# Patient Record
Sex: FEMALE | Race: WHITE | NOT HISPANIC OR LATINO | Employment: OTHER | URBAN - NONMETROPOLITAN AREA
[De-identification: names, ages, dates, MRNs, and addresses within clinical notes are randomized per-mention and may not be internally consistent; named-entity substitution may affect disease eponyms.]

---

## 2017-02-14 ENCOUNTER — OFFICE VISIT (OUTPATIENT)
Dept: PAIN MEDICINE | Facility: CLINIC | Age: 52
End: 2017-02-14

## 2017-02-14 VITALS
BODY MASS INDEX: 47.2 KG/M2 | WEIGHT: 256.5 LBS | SYSTOLIC BLOOD PRESSURE: 138 MMHG | HEIGHT: 62 IN | DIASTOLIC BLOOD PRESSURE: 88 MMHG

## 2017-02-14 DIAGNOSIS — M47.817 LUMBOSACRAL SPONDYLOSIS WITHOUT MYELOPATHY: Primary | ICD-10-CM

## 2017-02-14 DIAGNOSIS — M25.561 CHRONIC PAIN OF RIGHT KNEE: ICD-10-CM

## 2017-02-14 DIAGNOSIS — G89.29 CHRONIC PAIN OF RIGHT KNEE: ICD-10-CM

## 2017-02-14 DIAGNOSIS — Z79.899 HIGH RISK MEDICATIONS (NOT ANTICOAGULANTS) LONG-TERM USE: ICD-10-CM

## 2017-02-14 PROCEDURE — 99213 OFFICE O/P EST LOW 20 MIN: CPT | Performed by: PAIN MEDICINE

## 2017-02-14 RX ORDER — HYDROCODONE BITARTRATE AND ACETAMINOPHEN 7.5; 325 MG/1; MG/1
1 TABLET ORAL 4 TIMES DAILY
Qty: 120 TABLET | Refills: 0 | Status: SHIPPED | OUTPATIENT
Start: 2017-02-14 | End: 2017-03-16

## 2017-02-14 RX ORDER — MELATONIN
1000 DAILY
COMMUNITY
End: 2021-01-25

## 2017-02-14 RX ORDER — VALSARTAN 80 MG/1
80 TABLET ORAL NIGHTLY
COMMUNITY
Start: 2017-02-03 | End: 2020-04-22

## 2017-02-14 RX ORDER — CYCLOBENZAPRINE HCL 10 MG
TABLET ORAL
COMMUNITY
Start: 2016-12-30 | End: 2017-04-12 | Stop reason: ALTCHOICE

## 2017-02-14 RX ORDER — GABAPENTIN 300 MG/1
600 CAPSULE ORAL NIGHTLY
COMMUNITY
Start: 2016-12-23

## 2017-02-14 RX ORDER — RANITIDINE 150 MG/1
CAPSULE ORAL
COMMUNITY
Start: 2016-12-04 | End: 2020-12-09

## 2017-02-14 NOTE — PROGRESS NOTES
Haven Mahoney is a 51 y.o. female.   1965    HPI:   Location: lower back and upper back  Quality: aching, sharp, dull and tingling  Severity: 6/10  Timing: constant  Alleviating: pain medication  Aggravating: increased activity     Pt states she feels as though the rftc is wearing off and would like it repeated.  It provided good relief for 6 months.  She is 9 months out now.        The following portions of the patient's history were reviewed by me and updated as appropriate: allergies, current medications, past family history, past medical history, past social history, past surgical history and problem list.    Past Medical History   Diagnosis Date   • Anxiety    • Arthritis    • Arthropathy of lumbar facet joint    • Chronic depression    • Degeneration of lumbar intervertebral disc    • Drug therapy      Other long term (current) drug therapy      • Epilepsy    • Headache    • Hearing loss    • Hypercholesterolemia    • Hypertensive disorder    • Obesity    • Osteoporosis    • Osteoporosis    • Varicose veins of lower extremity        Social History     Social History   • Marital status:      Spouse name: N/A   • Number of children: N/A   • Years of education: N/A     Occupational History   • Not on file.     Social History Main Topics   • Smoking status: Never Smoker   • Smokeless tobacco: Not on file   • Alcohol use No   • Drug use: No   • Sexual activity: Defer     Other Topics Concern   • Not on file     Social History Narrative       Family History   Problem Relation Age of Onset   • Asthma Mother    • Cancer Mother      other   • Diabetes Mother    • Heart disease Mother    • Hyperlipidemia Mother    • Migraines Mother    • Osteoporosis Mother    • Cancer Father      other   • Hyperlipidemia Father    • Diabetes Maternal Grandmother    • Heart disease Maternal Grandfather    • Mental illness Other      Mental Disorder         Current Outpatient Prescriptions:   •  atorvastatin (LIPITOR) 20  MG tablet, Take 20 mg by mouth every night., Disp: , Rfl:   •  cetirizine (ZyrTEC) 10 MG tablet, Take 10 mg by mouth daily., Disp: , Rfl:   •  cholecalciferol (VITAMIN D3) 1000 UNITS tablet, Take 1,000 Units by mouth Daily., Disp: , Rfl:   •  cyclobenzaprine (FLEXERIL) 10 MG tablet, , Disp: , Rfl:   •  Esomeprazole Magnesium (NEXIUM PO), Take  by mouth every night., Disp: , Rfl:   •  gabapentin (NEURONTIN) 300 MG capsule, , Disp: , Rfl:   •  HYDROcodone-acetaminophen (NORCO) 7.5-325 MG per tablet, Take 1 tablet by mouth every 6 (six) hours as needed for moderate pain (4-6)., Disp: , Rfl:   •  raloxifene (EVISTA) 60 MG tablet, Take 60 mg by mouth daily., Disp: , Rfl:   •  ranitidine (ZANTAC) 150 MG capsule, , Disp: , Rfl:   •  valsartan (DIOVAN) 80 MG tablet, , Disp: , Rfl:   •  HYDROcodone-acetaminophen (NORCO) 7.5-325 MG per tablet, Take 1 tablet by mouth 4 (Four) Times a Day for 30 days., Disp: 120 tablet, Rfl: 0  •  HYDROcodone-acetaminophen (NORCO) 7.5-325 MG per tablet, Take 1 tablet by mouth 4 (Four) Times a Day for 30 days., Disp: 120 tablet, Rfl: 0    Allergies   Allergen Reactions   • Advil [Ibuprofen]    • Augmentin [Amoxicillin-Pot Clavulanate]    • Biaxin [Clarithromycin]    • Naprosyn [Naproxen]          Review of Systems   Musculoskeletal: Positive for back pain (lower and upper).     10 system review of systems was reviewed and negative except for above.    Physical Exam   Constitutional: She appears well-developed and well-nourished. No distress.   Musculoskeletal:        Right knee: She exhibits decreased range of motion (flexion less than 90deg).        Lumbar back: She exhibits decreased range of motion (left facet loading. ext 5deg.  flexion limited to 30 deg approx).   Neurological: She is alert.   Psychiatric: She has a normal mood and affect. Her behavior is normal. Judgment normal.       Haven was seen today for back pain.    Diagnoses and all orders for this visit:    Lumbosacral spondylosis  without myelopathy  -     IR RFA lumbar sacral 1st level left; Future  -     IR RFA lumbar sacral 2nd level left; Future    Chronic pain of right knee    High risk medications (not anticoagulants) long-term use    Other orders  -     HYDROcodone-acetaminophen (NORCO) 7.5-325 MG per tablet; Take 1 tablet by mouth 4 (Four) Times a Day for 30 days.  -     HYDROcodone-acetaminophen (NORCO) 7.5-325 MG per tablet; Take 1 tablet by mouth 4 (Four) Times a Day for 30 days.        Medication: Patient reports no negative side effects, Patient reports appropriate usage and storage habits, Patient's opioid provides enough reflief to be more active and perform activities of daily living with less discomfort. and Refill opioid medication as above    Interventional:  I have discussed the risks and benefits of the procedure with the patient.  left rftc at l4-5 and l5-1    Rehab: none at this time    Behavioral: No aberrant behavior noted. [facility] Report #[id number]  was reviewed and is consistent with stated history    Urine drug screen None at this time          This document has been electronically signed by Dudley Gamboa MD on February 14, 2017 7:59 AM

## 2017-02-27 ENCOUNTER — HOSPITAL ENCOUNTER (OUTPATIENT)
Dept: INTERVENTIONAL RADIOLOGY/VASCULAR | Facility: HOSPITAL | Age: 52
Discharge: HOME | End: 2017-02-27
Attending: PAIN MEDICINE

## 2017-02-27 ENCOUNTER — HOSPITAL ENCOUNTER (OUTPATIENT)
Dept: INTERVENTIONAL RADIOLOGY/VASCULAR | Facility: HOSPITAL | Age: 52
Discharge: HOME | End: 2017-02-27
Attending: PAIN MEDICINE | Admitting: PAIN MEDICINE

## 2017-02-27 VITALS
HEART RATE: 94 BPM | SYSTOLIC BLOOD PRESSURE: 176 MMHG | OXYGEN SATURATION: 96 % | DIASTOLIC BLOOD PRESSURE: 82 MMHG | RESPIRATION RATE: 20 BRPM

## 2017-02-27 DIAGNOSIS — M47.817 LUMBOSACRAL SPONDYLOSIS WITHOUT MYELOPATHY: ICD-10-CM

## 2017-02-27 PROCEDURE — 25010000002 METHYLPREDNISOLONE PER 40 MG: Performed by: PAIN MEDICINE

## 2017-02-27 PROCEDURE — 64635 DESTROY LUMB/SAC FACET JNT: CPT | Performed by: PAIN MEDICINE

## 2017-02-27 PROCEDURE — 64636 DESTROY L/S FACET JNT ADDL: CPT | Performed by: PAIN MEDICINE

## 2017-02-27 RX ORDER — METHYLPREDNISOLONE ACETATE 40 MG/ML
INJECTION, SUSPENSION INTRA-ARTICULAR; INTRALESIONAL; INTRAMUSCULAR; SOFT TISSUE
Status: COMPLETED | OUTPATIENT
Start: 2017-02-27 | End: 2017-02-27

## 2017-02-27 RX ORDER — LIDOCAINE HYDROCHLORIDE 10 MG/ML
INJECTION, SOLUTION INFILTRATION; PERINEURAL
Status: COMPLETED | OUTPATIENT
Start: 2017-02-27 | End: 2017-02-27

## 2017-02-27 RX ORDER — BUPIVACAINE HYDROCHLORIDE 5 MG/ML
INJECTION, SOLUTION PERINEURAL
Status: COMPLETED | OUTPATIENT
Start: 2017-02-27 | End: 2017-02-27

## 2017-02-27 RX ADMIN — BUPIVACAINE HYDROCHLORIDE 3 ML: 5 INJECTION, SOLUTION PERINEURAL at 13:19

## 2017-02-27 RX ADMIN — METHYLPREDNISOLONE ACETATE 12 MG: 40 INJECTION, SUSPENSION INTRA-ARTICULAR; INTRALESIONAL; INTRAMUSCULAR; SOFT TISSUE at 13:20

## 2017-02-27 RX ADMIN — LIDOCAINE HYDROCHLORIDE 7 ML: 10 INJECTION, SOLUTION INFILTRATION; PERINEURAL at 13:19

## 2017-04-12 ENCOUNTER — APPOINTMENT (OUTPATIENT)
Dept: LAB | Facility: HOSPITAL | Age: 52
End: 2017-04-12

## 2017-04-12 ENCOUNTER — OFFICE VISIT (OUTPATIENT)
Dept: PAIN MEDICINE | Facility: CLINIC | Age: 52
End: 2017-04-12

## 2017-04-12 VITALS
DIASTOLIC BLOOD PRESSURE: 92 MMHG | HEIGHT: 62 IN | WEIGHT: 255.4 LBS | BODY MASS INDEX: 47 KG/M2 | SYSTOLIC BLOOD PRESSURE: 160 MMHG

## 2017-04-12 DIAGNOSIS — Z79.899 HIGH RISK MEDICATIONS (NOT ANTICOAGULANTS) LONG-TERM USE: ICD-10-CM

## 2017-04-12 DIAGNOSIS — G89.29 CHRONIC PAIN OF RIGHT KNEE: ICD-10-CM

## 2017-04-12 DIAGNOSIS — M25.561 CHRONIC PAIN OF RIGHT KNEE: ICD-10-CM

## 2017-04-12 DIAGNOSIS — M47.817 LUMBOSACRAL SPONDYLOSIS WITHOUT MYELOPATHY: Primary | ICD-10-CM

## 2017-04-12 PROCEDURE — 99213 OFFICE O/P EST LOW 20 MIN: CPT | Performed by: NURSE PRACTITIONER

## 2017-04-12 PROCEDURE — G0481 DRUG TEST DEF 8-14 CLASSES: HCPCS | Performed by: NURSE PRACTITIONER

## 2017-04-12 PROCEDURE — 80307 DRUG TEST PRSMV CHEM ANLYZR: CPT | Performed by: NURSE PRACTITIONER

## 2017-04-12 NOTE — PROGRESS NOTES
Haven Mahoney is a 51 y.o. female.   1965    HPI:   Location: lower back and upper back  Quality: aching, sharp, dull and tingling  Severity: 6/10  Timing: constant  Alleviating: pain medication  Aggravating: increased activity    Pt reports 80% efficacy from recent RFA, especially with left leg pain. Opiate medications provides enough relief for daily activity and ambulation. NO SE. Pt happy with pain management visit and regimen.         The following portions of the patient's history were reviewed by me and updated as appropriate: allergies, current medications, past family history, past medical history, past social history, past surgical history and problem list.    Past Medical History:   Diagnosis Date   • Anxiety    • Arthritis    • Arthropathy of lumbar facet joint    • Chronic depression    • Degeneration of lumbar intervertebral disc    • Drug therapy     Other long term (current) drug therapy      • Epilepsy    • Headache    • Hearing loss    • Hypercholesterolemia    • Hypertensive disorder    • Obesity    • Osteoporosis    • Osteoporosis    • Varicose veins of lower extremity        Social History     Social History   • Marital status:      Spouse name: N/A   • Number of children: N/A   • Years of education: N/A     Occupational History   • Not on file.     Social History Main Topics   • Smoking status: Never Smoker   • Smokeless tobacco: Not on file   • Alcohol use No   • Drug use: No   • Sexual activity: Defer     Other Topics Concern   • Not on file     Social History Narrative       Family History   Problem Relation Age of Onset   • Asthma Mother    • Cancer Mother      other   • Diabetes Mother    • Heart disease Mother    • Hyperlipidemia Mother    • Migraines Mother    • Osteoporosis Mother    • Cancer Father      other   • Hyperlipidemia Father    • Diabetes Maternal Grandmother    • Heart disease Maternal Grandfather    • Mental illness Other      Mental Disorder         Current  Outpatient Prescriptions:   •  atorvastatin (LIPITOR) 20 MG tablet, Take 20 mg by mouth every night., Disp: , Rfl:   •  cetirizine (ZyrTEC) 10 MG tablet, Take 10 mg by mouth daily., Disp: , Rfl:   •  cholecalciferol (VITAMIN D3) 1000 UNITS tablet, Take 1,000 Units by mouth Daily., Disp: , Rfl:   •  Esomeprazole Magnesium (NEXIUM PO), Take  by mouth every night., Disp: , Rfl:   •  gabapentin (NEURONTIN) 300 MG capsule, , Disp: , Rfl:   •  HYDROcodone-acetaminophen (NORCO) 7.5-325 MG per tablet, Take 1 tablet by mouth every 6 (six) hours as needed for moderate pain (4-6)., Disp: , Rfl:   •  raloxifene (EVISTA) 60 MG tablet, Take 60 mg by mouth daily., Disp: , Rfl:   •  ranitidine (ZANTAC) 150 MG capsule, , Disp: , Rfl:   •  valsartan (DIOVAN) 80 MG tablet, , Disp: , Rfl:     Allergies   Allergen Reactions   • Advil [Ibuprofen]    • Augmentin [Amoxicillin-Pot Clavulanate]    • Biaxin [Clarithromycin]    • Naprosyn [Naproxen]    • Nsaids          Review of Systems   Musculoskeletal: Positive for back pain (lower and upper).     10 system review of systems was reviewed and negative except for above.    Physical Exam   Constitutional: She appears well-developed and well-nourished. No distress.   Cardiovascular: Normal rate and regular rhythm.    Pulmonary/Chest: Effort normal.   Musculoskeletal:        Right knee: She exhibits normal range of motion. Tenderness found.        Lumbar back: She exhibits decreased range of motion ( Flex 90 deg and 15 deg ext with mild FL) and tenderness.   Right knee tenderness with ambulation   Neurological: She is alert. Coordination and gait normal.   Reflex Scores:       Tricep reflexes are 2+ on the right side and 2+ on the left side.       Bicep reflexes are 2+ on the right side and 2+ on the left side.       Brachioradialis reflexes are 2+ on the right side and 2+ on the left side.       Patellar reflexes are 1+ on the right side and 1+ on the left side.       Achilles reflexes are 1+  "on the right side and 1+ on the left side.  Mild left SLR    Skin: Skin is warm and dry.   Psychiatric: She has a normal mood and affect. Her behavior is normal. Judgment normal. She expresses no homicidal and no suicidal ideation. She expresses no suicidal plans and no homicidal plans.   Nursing note and vitals reviewed.      Haven was seen today for back pain.    Diagnoses and all orders for this visit:    Lumbosacral spondylosis without myelopathy    Chronic pain of right knee    High risk medications (not anticoagulants) long-term use  -     ToxASSURE Select 13 (MW)      Medication: Patient reports no negative side effects, Patient reports appropriate usage and storage habits and Patient's opioid provides enough reflief to be more active and perform activities of daily living with less discomfort. Norco 7.5mg TID. Discussed case with Angel Gamboa, opiate medication refilled ×2 hand written scripts.      Interventional: Pt states \"RF effective 80%\"    Rehab: home stretching    Behavioral: No aberrant behavior noted. [facility] Report # [id number]  was reviewed and is consistent with stated history    Urine drug screen Ordered today to test for drugs of abuse and prescribed medications          This document has been electronically signed by MORENITA Enriquez on April 12, 2017 7:57 AM          This document has been electronically signed by MORENITA Enriquez on April 12, 2017 7:57 AM     "

## 2017-04-18 LAB
CONV REPORT SUMMARY: NORMAL
Lab: NORMAL

## 2017-06-09 ENCOUNTER — OFFICE VISIT (OUTPATIENT)
Dept: PAIN MEDICINE | Facility: CLINIC | Age: 52
End: 2017-06-09

## 2017-06-09 VITALS
WEIGHT: 250.8 LBS | DIASTOLIC BLOOD PRESSURE: 92 MMHG | HEIGHT: 60 IN | SYSTOLIC BLOOD PRESSURE: 140 MMHG | BODY MASS INDEX: 49.24 KG/M2

## 2017-06-09 DIAGNOSIS — M47.817 LUMBOSACRAL SPONDYLOSIS WITHOUT MYELOPATHY: Primary | ICD-10-CM

## 2017-06-09 DIAGNOSIS — G89.29 CHRONIC PAIN OF RIGHT KNEE: ICD-10-CM

## 2017-06-09 DIAGNOSIS — M25.561 CHRONIC PAIN OF RIGHT KNEE: ICD-10-CM

## 2017-06-09 DIAGNOSIS — M79.672 LEFT FOOT PAIN: ICD-10-CM

## 2017-06-09 DIAGNOSIS — Z79.899 HIGH RISK MEDICATIONS (NOT ANTICOAGULANTS) LONG-TERM USE: ICD-10-CM

## 2017-06-09 PROCEDURE — 99214 OFFICE O/P EST MOD 30 MIN: CPT | Performed by: PAIN MEDICINE

## 2017-06-09 RX ORDER — CLOPIDOGREL BISULFATE 75 MG/1
TABLET ORAL
COMMUNITY
Start: 2017-05-22 | End: 2021-01-25

## 2017-06-09 RX ORDER — PANTOPRAZOLE SODIUM 40 MG/1
TABLET, DELAYED RELEASE ORAL
COMMUNITY
Start: 2017-04-13 | End: 2021-01-25

## 2017-06-09 RX ORDER — HYDROCODONE BITARTRATE AND ACETAMINOPHEN 7.5; 325 MG/1; MG/1
1 TABLET ORAL
Qty: 150 TABLET | Refills: 0 | Status: SHIPPED | OUTPATIENT
Start: 2017-06-09 | End: 2017-07-09

## 2017-06-09 RX ORDER — FLUCONAZOLE 150 MG/1
TABLET ORAL
COMMUNITY
Start: 2017-04-18 | End: 2021-01-25

## 2017-06-09 NOTE — PROGRESS NOTES
Haven Mahoney is a 51 y.o. female.   1965    HPI:   Location: lower back upper  Quality: aching, sharp, dull and tingling  Severity: 6/10  Timing: constant  Alleviating: pain medication  Aggravating: increased activity     Pt seeing podiatry about a bone spur.  Waiting until summer is over to have surgery as she is watching her grandkids.  norco 7.5 qid not covering as well b/c of this foot issue.  She is requesting an increase until she has surgery.       The following portions of the patient's history were reviewed by me and updated as appropriate: allergies, current medications, past family history, past medical history, past social history, past surgical history and problem list.    Past Medical History:   Diagnosis Date   • Anxiety    • Arthritis    • Arthropathy of lumbar facet joint    • Chronic depression    • Degeneration of lumbar intervertebral disc    • Drug therapy     Other long term (current) drug therapy      • Epilepsy    • Headache    • Hearing loss    • Hypercholesterolemia    • Hypertensive disorder    • Obesity    • Osteoporosis    • Osteoporosis    • Varicose veins of lower extremity        Social History     Social History   • Marital status:      Spouse name: N/A   • Number of children: N/A   • Years of education: N/A     Occupational History   • Not on file.     Social History Main Topics   • Smoking status: Never Smoker   • Smokeless tobacco: Never Used   • Alcohol use No   • Drug use: No   • Sexual activity: Defer     Other Topics Concern   • Not on file     Social History Narrative       Family History   Problem Relation Age of Onset   • Asthma Mother    • Cancer Mother      other   • Diabetes Mother    • Heart disease Mother    • Hyperlipidemia Mother    • Migraines Mother    • Osteoporosis Mother    • Cancer Father      other   • Hyperlipidemia Father    • Diabetes Maternal Grandmother    • Heart disease Maternal Grandfather    • Mental illness Other      Mental Disorder          Current Outpatient Prescriptions:   •  atorvastatin (LIPITOR) 20 MG tablet, Take 20 mg by mouth every night., Disp: , Rfl:   •  cetirizine (ZyrTEC) 10 MG tablet, Take 10 mg by mouth daily., Disp: , Rfl:   •  cholecalciferol (VITAMIN D3) 1000 UNITS tablet, Take 1,000 Units by mouth Daily., Disp: , Rfl:   •  clopidogrel (PLAVIX) 75 MG tablet, , Disp: , Rfl:   •  Esomeprazole Magnesium (NEXIUM PO), Take  by mouth every night., Disp: , Rfl:   •  fluconazole (DIFLUCAN) 150 MG tablet, , Disp: , Rfl:   •  gabapentin (NEURONTIN) 300 MG capsule, , Disp: , Rfl:   •  HYDROcodone-acetaminophen (NORCO) 7.5-325 MG per tablet, Take 1 tablet by mouth every 6 (six) hours as needed for moderate pain (4-6)., Disp: , Rfl:   •  ranitidine (ZANTAC) 150 MG capsule, , Disp: , Rfl:   •  valsartan (DIOVAN) 80 MG tablet, , Disp: , Rfl:   •  HYDROcodone-acetaminophen (NORCO) 7.5-325 MG per tablet, Take 1 tablet by mouth 5 (Five) Times a Day for 30 days., Disp: 150 tablet, Rfl: 0  •  HYDROcodone-acetaminophen (NORCO) 7.5-325 MG per tablet, Take 1 tablet by mouth 5 (Five) Times a Day for 30 days., Disp: 150 tablet, Rfl: 0  •  pantoprazole (PROTONIX) 40 MG EC tablet, , Disp: , Rfl:   •  raloxifene (EVISTA) 60 MG tablet, Take 60 mg by mouth daily., Disp: , Rfl:     Allergies   Allergen Reactions   • Advil [Ibuprofen]    • Augmentin [Amoxicillin-Pot Clavulanate]    • Biaxin [Clarithromycin]    • Naprosyn [Naproxen]    • Nsaids        Review of Systems   Musculoskeletal: Positive for back pain (lower upper).   All other systems reviewed and are negative.    All systems reviewed and negative except for above.    Physical Exam   Constitutional: She appears well-developed and well-nourished. No distress.   Musculoskeletal:        Right knee: She exhibits decreased range of motion (flexion to about 110deg with discomfort).        Lumbar back: She exhibits decreased range of motion (mod left facet loading. ext 10deg.  flexion limited to 30 deg  approx).   Neurological: She is alert.   Psychiatric: She has a normal mood and affect. Her behavior is normal. Judgment normal.       Haven was seen today for back pain.    Diagnoses and all orders for this visit:    Lumbosacral spondylosis without myelopathy    Chronic pain of right knee    High risk medications (not anticoagulants) long-term use    Left foot pain    Other orders  -     HYDROcodone-acetaminophen (NORCO) 7.5-325 MG per tablet; Take 1 tablet by mouth 5 (Five) Times a Day for 30 days.  -     HYDROcodone-acetaminophen (NORCO) 7.5-325 MG per tablet; Take 1 tablet by mouth 5 (Five) Times a Day for 30 days.      Medication: Patient reports no negative side effects, Patient reports appropriate usage and storage habits, Patient's opioid provides enough reflief to be more active and perform activities of daily living with less discomfort. and Refill opioid medication as above.  Increase by 1 dose per day for better coverage due to pre-surgical foot pain.  Plan to reduce back to previous dose after surgery.     Interventional: none at this time  RF in February still helping.  May repeat in future if necessary.     Rehab: none at this time    Behavioral: No aberrant behavior noted. [facility] Report #[id number]  was reviewed and is consistent with stated history.  Reports her controlled meds are kept in a safe at home.     Urine drug screen Reviewed from last visit and is appropriate          This document has been electronically signed by Dudley Gabmoa MD on June 9, 2017 7:49 AM

## 2017-08-07 ENCOUNTER — OFFICE VISIT (OUTPATIENT)
Dept: PAIN MEDICINE | Facility: CLINIC | Age: 52
End: 2017-08-07

## 2017-08-07 VITALS
HEIGHT: 62 IN | SYSTOLIC BLOOD PRESSURE: 164 MMHG | DIASTOLIC BLOOD PRESSURE: 94 MMHG | WEIGHT: 251.5 LBS | BODY MASS INDEX: 46.28 KG/M2

## 2017-08-07 DIAGNOSIS — M47.817 LUMBOSACRAL SPONDYLOSIS WITHOUT MYELOPATHY: Primary | ICD-10-CM

## 2017-08-07 DIAGNOSIS — Z79.899 HIGH RISK MEDICATIONS (NOT ANTICOAGULANTS) LONG-TERM USE: ICD-10-CM

## 2017-08-07 DIAGNOSIS — M79.18 MYOFACIAL MUSCLE PAIN: ICD-10-CM

## 2017-08-07 DIAGNOSIS — M25.561 CHRONIC PAIN OF RIGHT KNEE: ICD-10-CM

## 2017-08-07 DIAGNOSIS — G89.29 CHRONIC PAIN OF RIGHT KNEE: ICD-10-CM

## 2017-08-07 PROCEDURE — 99213 OFFICE O/P EST LOW 20 MIN: CPT | Performed by: PAIN MEDICINE

## 2017-08-07 NOTE — PROGRESS NOTES
"Haven Mahoney is a 51 y.o. female.   1965    HPI:   Location: lower back  Quality: aching, sharp, dull and tingling  Severity: 6/10  Timing: constant  Alleviating: pain medication  Aggravating: increased activity    Pt suffering from \"NV\" for 2 days, seeing MD today. Pt seen Podiatry - Dr. Rowland, left ankle and bone spur upcoming soon. Opiate medications provides enough relief for daily activity and ambulation. NO SE. Pt happy with pain management visit and regimen.         The following portions of the patient's history were reviewed by me and updated as appropriate: allergies, current medications, past family history, past medical history, past social history, past surgical history and problem list.    Past Medical History:   Diagnosis Date   • Anxiety    • Arthritis    • Arthropathy of lumbar facet joint    • Chronic depression    • Degeneration of lumbar intervertebral disc    • Drug therapy     Other long term (current) drug therapy      • Epilepsy    • Headache    • Hearing loss    • Hypercholesterolemia    • Hypertensive disorder    • Obesity    • Osteoporosis    • Osteoporosis    • Varicose veins of lower extremity        Social History     Social History   • Marital status:      Spouse name: N/A   • Number of children: N/A   • Years of education: N/A     Occupational History   • Not on file.     Social History Main Topics   • Smoking status: Never Smoker   • Smokeless tobacco: Never Used   • Alcohol use No   • Drug use: No   • Sexual activity: Defer     Other Topics Concern   • Not on file     Social History Narrative       Family History   Problem Relation Age of Onset   • Asthma Mother    • Cancer Mother      other   • Diabetes Mother    • Heart disease Mother    • Hyperlipidemia Mother    • Migraines Mother    • Osteoporosis Mother    • Cancer Father      other   • Hyperlipidemia Father    • Diabetes Maternal Grandmother    • Heart disease Maternal Grandfather    • Mental illness Other "      Mental Disorder         Current Outpatient Prescriptions:   •  atorvastatin (LIPITOR) 20 MG tablet, Take 20 mg by mouth every night., Disp: , Rfl:   •  cetirizine (ZyrTEC) 10 MG tablet, Take 10 mg by mouth daily., Disp: , Rfl:   •  cholecalciferol (VITAMIN D3) 1000 UNITS tablet, Take 1,000 Units by mouth Daily., Disp: , Rfl:   •  clopidogrel (PLAVIX) 75 MG tablet, , Disp: , Rfl:   •  Esomeprazole Magnesium (NEXIUM PO), Take  by mouth every night., Disp: , Rfl:   •  fluconazole (DIFLUCAN) 150 MG tablet, , Disp: , Rfl:   •  gabapentin (NEURONTIN) 300 MG capsule, , Disp: , Rfl:   •  HYDROcodone-acetaminophen (NORCO) 7.5-325 MG per tablet, Take 1 tablet by mouth every 6 (six) hours as needed for moderate pain (4-6)., Disp: , Rfl:   •  pantoprazole (PROTONIX) 40 MG EC tablet, , Disp: , Rfl:   •  raloxifene (EVISTA) 60 MG tablet, Take 60 mg by mouth daily., Disp: , Rfl:   •  ranitidine (ZANTAC) 150 MG capsule, , Disp: , Rfl:   •  valsartan (DIOVAN) 80 MG tablet, , Disp: , Rfl:     Allergies   Allergen Reactions   • Advil [Ibuprofen]    • Augmentin [Amoxicillin-Pot Clavulanate]    • Biaxin [Clarithromycin]    • Naprosyn [Naproxen]    • Nsaids        Review of Systems   Musculoskeletal: Positive for back pain.   All other systems reviewed and are negative.    All systems reviewed and negative except for above.    Physical Exam   Constitutional: She is oriented to person, place, and time. She appears well-developed and well-nourished. No distress.   Cardiovascular: Normal rate.    Pulmonary/Chest: Effort normal.   Musculoskeletal:        Right knee: She exhibits decreased range of motion (nearly full flex - tenderness with ambulation).        Lumbar back: She exhibits decreased range of motion (Flex 60 deg and 10 ext deg with Tha FL) and tenderness.   Neurological: She is alert and oriented to person, place, and time. She displays no tremor. Coordination and gait normal.   Skin: Skin is warm.   Psychiatric: She has a  normal mood and affect. Her behavior is normal. Judgment normal. She expresses no homicidal and no suicidal ideation. She expresses no suicidal plans and no homicidal plans.   Nursing note and vitals reviewed.      Haven was seen today for back pain.    Diagnoses and all orders for this visit:    Lumbosacral spondylosis without myelopathy    Chronic pain of right knee    High risk medications (not anticoagulants) long-term use    Myofacial muscle pain      Medication: Patient reports no negative side effects, Patient reports appropriate usage and storage habits and Patient's opioid provides enough reflief to be more active and perform activities of daily living with less discomfort. Norco 7.5mg 5 x day . Discussed case with Angel Gamboa, opiate medication refilled ×2 hand written scripts.      Interventional: upcoming left heel spur surgery. WILFREDO and any neurological impairments discussed with patient that may need of emergency evaluation.    Rehab: Sees podiatry    Behavioral: No aberrant behavior noted. [facility] Report # [id number]  was reviewed and is consistent with stated history    Urine drug screen None at this time          This document has been electronically signed by MORENITA Enriquez on August 7, 2017 8:09 AM          This document has been electronically signed by MORENITA Enriquez on August 7, 2017 8:09 AM

## 2017-10-03 ENCOUNTER — OFFICE VISIT (OUTPATIENT)
Dept: PAIN MEDICINE | Facility: CLINIC | Age: 52
End: 2017-10-03

## 2017-10-03 VITALS
BODY MASS INDEX: 51.03 KG/M2 | HEIGHT: 60 IN | DIASTOLIC BLOOD PRESSURE: 84 MMHG | WEIGHT: 259.9 LBS | SYSTOLIC BLOOD PRESSURE: 144 MMHG

## 2017-10-03 DIAGNOSIS — Z79.899 HIGH RISK MEDICATIONS (NOT ANTICOAGULANTS) LONG-TERM USE: ICD-10-CM

## 2017-10-03 DIAGNOSIS — M25.561 CHRONIC PAIN OF RIGHT KNEE: ICD-10-CM

## 2017-10-03 DIAGNOSIS — G89.29 CHRONIC PAIN OF RIGHT KNEE: ICD-10-CM

## 2017-10-03 DIAGNOSIS — M79.18 MYOFACIAL MUSCLE PAIN: ICD-10-CM

## 2017-10-03 DIAGNOSIS — M47.817 LUMBOSACRAL SPONDYLOSIS WITHOUT MYELOPATHY: Primary | ICD-10-CM

## 2017-10-03 DIAGNOSIS — Z87.19 HX OF PANCREATITIS: ICD-10-CM

## 2017-10-03 PROCEDURE — 99214 OFFICE O/P EST MOD 30 MIN: CPT | Performed by: PAIN MEDICINE

## 2017-10-03 NOTE — PROGRESS NOTES
"Haven Mahoney is a 51 y.o. female.   1965    HPI:   Location: lower back  Quality: aching, sharp, dull and tingling  Severity: 6/10  Timing: constant  Alleviating: pain medication  Aggravating: increased activity    Pt reports 'was admitted to hospital\" recently for Pancreatitis- Seeing Dr. Crawley. Slight NV and abd pain. No opiate medications will be given today- unsure if opiates will be a part of treatment at this point. Pt aware        The following portions of the patient's history were reviewed by me and updated as appropriate: allergies, current medications, past family history, past medical history, past social history, past surgical history and problem list.    Past Medical History:   Diagnosis Date   • Anxiety    • Arthritis    • Arthropathy of lumbar facet joint    • Chronic depression    • Degeneration of lumbar intervertebral disc    • Drug therapy     Other long term (current) drug therapy      • Epilepsy    • Headache    • Hearing loss    • Hypercholesterolemia    • Hypertensive disorder    • Obesity    • Osteoporosis    • Osteoporosis    • Varicose veins of lower extremity        Social History     Social History   • Marital status:      Spouse name: N/A   • Number of children: N/A   • Years of education: N/A     Occupational History   • Not on file.     Social History Main Topics   • Smoking status: Never Smoker   • Smokeless tobacco: Never Used   • Alcohol use No   • Drug use: No   • Sexual activity: Defer     Other Topics Concern   • Not on file     Social History Narrative       Family History   Problem Relation Age of Onset   • Asthma Mother    • Cancer Mother      other   • Diabetes Mother    • Heart disease Mother    • Hyperlipidemia Mother    • Migraines Mother    • Osteoporosis Mother    • Cancer Father      other   • Hyperlipidemia Father    • Diabetes Maternal Grandmother    • Heart disease Maternal Grandfather    • Mental illness Other      Mental Disorder         Current " Outpatient Prescriptions:   •  atorvastatin (LIPITOR) 20 MG tablet, Take 20 mg by mouth every night., Disp: , Rfl:   •  cetirizine (ZyrTEC) 10 MG tablet, Take 10 mg by mouth daily., Disp: , Rfl:   •  cholecalciferol (VITAMIN D3) 1000 UNITS tablet, Take 1,000 Units by mouth Daily., Disp: , Rfl:   •  clopidogrel (PLAVIX) 75 MG tablet, , Disp: , Rfl:   •  Esomeprazole Magnesium (NEXIUM PO), Take  by mouth every night., Disp: , Rfl:   •  fluconazole (DIFLUCAN) 150 MG tablet, , Disp: , Rfl:   •  gabapentin (NEURONTIN) 300 MG capsule, , Disp: , Rfl:   •  HYDROcodone-acetaminophen (NORCO) 7.5-325 MG per tablet, Take 1 tablet by mouth every 6 (six) hours as needed for moderate pain (4-6)., Disp: , Rfl:   •  pantoprazole (PROTONIX) 40 MG EC tablet, , Disp: , Rfl:   •  raloxifene (EVISTA) 60 MG tablet, Take 60 mg by mouth daily., Disp: , Rfl:   •  ranitidine (ZANTAC) 150 MG capsule, , Disp: , Rfl:   •  valsartan (DIOVAN) 80 MG tablet, , Disp: , Rfl:     Allergies   Allergen Reactions   • Advil [Ibuprofen]    • Augmentin [Amoxicillin-Pot Clavulanate]    • Biaxin [Clarithromycin]    • Naprosyn [Naproxen]    • Nsaids        Review of Systems   Musculoskeletal: Positive for back pain.   All other systems reviewed and are negative.    All systems reviewed and negative except for above.    Physical Exam   Constitutional: She is oriented to person, place, and time. She appears well-developed and well-nourished. No distress.   Cardiovascular: Normal rate.    Pulmonary/Chest: Effort normal. No respiratory distress.   Abdominal: Soft. There is tenderness in the epigastric area.   Musculoskeletal:        Right knee: She exhibits decreased range of motion (tenderness with ambulation slight ext decrease).        Lumbar back: She exhibits decreased range of motion (Flex < 70deg and 10 ext deg with Tha facet loading) and tenderness ( midline ttp).   Neurological: She is alert and oriented to person, place, and time. She displays no tremor.  She displays no seizure activity. Coordination and gait normal.   Skin: Skin is warm.   Psychiatric: She has a normal mood and affect. Her behavior is normal. Judgment normal. She expresses no homicidal and no suicidal ideation. She expresses no suicidal plans and no homicidal plans.   Nursing note and vitals reviewed.      Haven was seen today for back pain.    Diagnoses and all orders for this visit:    Lumbosacral spondylosis without myelopathy    Chronic pain of right knee    High risk medications (not anticoagulants) long-term use    Myofacial muscle pain    Hx of pancreatitis      Medication: Patient reports no negative side effects, Patient reports appropriate usage and storage habits and Patient's opioid provides enough relief to be more active and perform activities of daily living with less discomfort. Norco 7.5mg 5 x day. Discussed case with Angel Gamboa, opiate medication refilled ×2 hand written scripts.      Interventional: Pt following up with [facility] siddiqi and [facility] Dr. Brown.     Rehab: knee exercises at home.     Behavioral: No aberrant behavior noted. [facility] Report # [id number]  was reviewed and is consistent with stated history    Urine drug screen None at this time- UDS next time          This document has been electronically signed by MORENITA Enriquez on October 3, 2017 7:56 AM    g      This document has been electronically signed by MORENITA Enriquez on October 3, 2017 7:56 AM

## 2017-11-27 ENCOUNTER — OFFICE VISIT (OUTPATIENT)
Dept: PAIN MEDICINE | Facility: CLINIC | Age: 52
End: 2017-11-27

## 2017-11-27 VITALS
BODY MASS INDEX: 51.04 KG/M2 | DIASTOLIC BLOOD PRESSURE: 84 MMHG | WEIGHT: 260 LBS | HEIGHT: 60 IN | SYSTOLIC BLOOD PRESSURE: 140 MMHG

## 2017-11-27 DIAGNOSIS — M47.817 LUMBOSACRAL SPONDYLOSIS WITHOUT MYELOPATHY: Primary | ICD-10-CM

## 2017-11-27 DIAGNOSIS — Z79.899 HIGH RISK MEDICATIONS (NOT ANTICOAGULANTS) LONG-TERM USE: ICD-10-CM

## 2017-11-27 DIAGNOSIS — G89.29 CHRONIC PAIN OF RIGHT KNEE: ICD-10-CM

## 2017-11-27 DIAGNOSIS — Z87.19 HX OF PANCREATITIS: ICD-10-CM

## 2017-11-27 DIAGNOSIS — M25.561 CHRONIC PAIN OF RIGHT KNEE: ICD-10-CM

## 2017-11-27 DIAGNOSIS — M79.18 MYOFACIAL MUSCLE PAIN: ICD-10-CM

## 2017-11-27 PROCEDURE — 99213 OFFICE O/P EST LOW 20 MIN: CPT | Performed by: NURSE PRACTITIONER

## 2017-11-27 RX ORDER — ATORVASTATIN CALCIUM 40 MG/1
TABLET, FILM COATED ORAL
COMMUNITY
Start: 2017-11-21 | End: 2020-04-22

## 2017-11-27 NOTE — PROGRESS NOTES
"Haven Mahoney is a 51 y.o. female.   1965    HPI:   Location: lower back  Quality: aching, sharp, dull and tingling  Severity: 6/10  Timing: constant  Alleviating: pain medication  Aggravating: increased activity    Pt reports \" pancreatitis under control at this time\"- sees Dr. Crawley in [place]. Pt with chronic lower back pain. Opiate medications provides enough relief for daily activity and ambulation. NO SE. Pt happy with pain management visit and regimen.         The following portions of the patient's history were reviewed by me and updated as appropriate: allergies, current medications, past family history, past medical history, past social history, past surgical history and problem list.    Past Medical History:   Diagnosis Date   • Anxiety    • Arthritis    • Arthropathy of lumbar facet joint    • Chronic depression    • Degeneration of lumbar intervertebral disc    • Drug therapy     Other long term (current) drug therapy      • Epilepsy    • Headache    • Hearing loss    • Hypercholesterolemia    • Hypertensive disorder    • Obesity    • Osteoporosis    • Osteoporosis    • Varicose veins of lower extremity        Social History     Social History   • Marital status:      Spouse name: N/A   • Number of children: N/A   • Years of education: N/A     Occupational History   • Not on file.     Social History Main Topics   • Smoking status: Never Smoker   • Smokeless tobacco: Never Used   • Alcohol use No   • Drug use: No   • Sexual activity: Defer     Other Topics Concern   • Not on file     Social History Narrative       Family History   Problem Relation Age of Onset   • Asthma Mother    • Cancer Mother      other   • Diabetes Mother    • Heart disease Mother    • Hyperlipidemia Mother    • Migraines Mother    • Osteoporosis Mother    • Cancer Father      other   • Hyperlipidemia Father    • Diabetes Maternal Grandmother    • Heart disease Maternal Grandfather    • Mental illness Other      " Mental Disorder         Current Outpatient Prescriptions:   •  atorvastatin (LIPITOR) 40 MG tablet, , Disp: , Rfl:   •  cetirizine (ZyrTEC) 10 MG tablet, Take 10 mg by mouth daily., Disp: , Rfl:   •  cholecalciferol (VITAMIN D3) 1000 UNITS tablet, Take 1,000 Units by mouth Daily., Disp: , Rfl:   •  clopidogrel (PLAVIX) 75 MG tablet, , Disp: , Rfl:   •  Esomeprazole Magnesium (NEXIUM PO), Take  by mouth every night., Disp: , Rfl:   •  fluconazole (DIFLUCAN) 150 MG tablet, , Disp: , Rfl:   •  gabapentin (NEURONTIN) 300 MG capsule, , Disp: , Rfl:   •  HYDROcodone-acetaminophen (NORCO) 7.5-325 MG per tablet, Take 1 tablet by mouth every 6 (six) hours as needed for moderate pain (4-6)., Disp: , Rfl:   •  pantoprazole (PROTONIX) 40 MG EC tablet, , Disp: , Rfl:   •  raloxifene (EVISTA) 60 MG tablet, Take 60 mg by mouth daily., Disp: , Rfl:   •  ranitidine (ZANTAC) 150 MG capsule, , Disp: , Rfl:   •  valsartan (DIOVAN) 80 MG tablet, , Disp: , Rfl:     Allergies   Allergen Reactions   • Advil [Ibuprofen]    • Augmentin [Amoxicillin-Pot Clavulanate]    • Biaxin [Clarithromycin]    • Naprosyn [Naproxen]    • Nsaids        Review of Systems   Musculoskeletal: Positive for back pain (lower).   All other systems reviewed and are negative.    All systems reviewed and negative except for above.    Physical Exam   Constitutional: She is oriented to person, place, and time. She appears well-developed and well-nourished. No distress.   Cardiovascular: Normal rate.    Pulmonary/Chest: Effort normal. No respiratory distress.   Abdominal: Soft.   Musculoskeletal:        Right knee: She exhibits decreased range of motion (tenderness with ambulation).        Lumbar back: She exhibits decreased range of motion (Flex  limited  75 deg and ext deg with jann facet loading) and tenderness (midline ttp).   Neurological: She is alert and oriented to person, place, and time. She displays no tremor. She displays no seizure activity. Coordination and  gait normal.   Reflex Scores:       Patellar reflexes are 2+ on the right side and 2+ on the left side.       Achilles reflexes are 2+ on the right side and 2+ on the left side.  Skin: Skin is warm.   Psychiatric: She has a normal mood and affect. Her behavior is normal. Judgment normal. She expresses no homicidal and no suicidal ideation. She expresses no suicidal plans and no homicidal plans.   Nursing note and vitals reviewed.      Haven was seen today for back pain.    Diagnoses and all orders for this visit:    Lumbosacral spondylosis without myelopathy    Chronic pain of right knee    High risk medications (not anticoagulants) long-term use    Myofacial muscle pain    Hx of pancreatitis        Medication: Patient reports no negative side effects, Patient reports appropriate usage and storage habits and Patient's opioid provides enough relief to be more active and perform activities of daily living with less discomfort. Norco 7.5mg 5 x day. Discussed case with Angel Gamboa, opiate medication refilled ×2 hand written scripts.      Interventional: Pt sees Dr. Crawley for pancreatitis.     Rehab: home passive exercises.     Behavioral: No aberrant behavior noted. [facility] Report # [id number]  was reviewed and is consistent with stated history    Urine drug screen None at this time          This document has been electronically signed by MORENITA Enriquez on November 27, 2017 8:05 AM          This document has been electronically signed by MORENITA Enriquez on November 27, 2017 8:05 AM

## 2020-02-06 ENCOUNTER — HOSPITAL ENCOUNTER (EMERGENCY)
Facility: HOSPITAL | Age: 55
Discharge: HOME | End: 2020-02-06
Attending: EMERGENCY MEDICINE | Admitting: EMERGENCY MEDICINE

## 2020-02-06 ENCOUNTER — APPOINTMENT (OUTPATIENT)
Dept: GENERAL RADIOLOGY | Facility: HOSPITAL | Age: 55
End: 2020-02-06

## 2020-02-06 VITALS
TEMPERATURE: 97.5 F | OXYGEN SATURATION: 95 % | WEIGHT: 257.19 LBS | SYSTOLIC BLOOD PRESSURE: 154 MMHG | BODY MASS INDEX: 50.23 KG/M2 | DIASTOLIC BLOOD PRESSURE: 88 MMHG | HEART RATE: 83 BPM | RESPIRATION RATE: 18 BRPM

## 2020-02-06 DIAGNOSIS — R51.9 NONINTRACTABLE HEADACHE, UNSPECIFIED CHRONICITY PATTERN, UNSPECIFIED HEADACHE TYPE: Primary | ICD-10-CM

## 2020-02-06 DIAGNOSIS — J06.9 UPPER RESPIRATORY TRACT INFECTION, UNSPECIFIED TYPE: ICD-10-CM

## 2020-02-06 LAB
ALBUMIN SERPL-MCNC: 4.4 G/DL (ref 3.5–5.2)
ALBUMIN/GLOB SERPL: 1.2 G/DL
ALP SERPL-CCNC: 147 U/L (ref 39–117)
ALT SERPL W P-5'-P-CCNC: 18 U/L (ref 1–33)
ANION GAP SERPL CALCULATED.3IONS-SCNC: 12 MMOL/L (ref 5–15)
AST SERPL-CCNC: 27 U/L (ref 1–32)
BASOPHILS # BLD AUTO: 0.05 10*3/MM3 (ref 0–0.2)
BASOPHILS NFR BLD AUTO: 0.6 % (ref 0–1.5)
BILIRUB SERPL-MCNC: 0.3 MG/DL (ref 0.2–1.2)
BUN BLD-MCNC: 7 MG/DL (ref 6–20)
BUN/CREAT SERPL: 9.7 (ref 7–25)
CALCIUM SPEC-SCNC: 9.9 MG/DL (ref 8.6–10.5)
CHLORIDE SERPL-SCNC: 100 MMOL/L (ref 98–107)
CO2 SERPL-SCNC: 28 MMOL/L (ref 22–29)
CREAT BLD-MCNC: 0.72 MG/DL (ref 0.57–1)
DEPRECATED RDW RBC AUTO: 39.1 FL (ref 37–54)
EOSINOPHIL # BLD AUTO: 0.07 10*3/MM3 (ref 0–0.4)
EOSINOPHIL NFR BLD AUTO: 0.8 % (ref 0.3–6.2)
ERYTHROCYTE [DISTWIDTH] IN BLOOD BY AUTOMATED COUNT: 13.6 % (ref 12.3–15.4)
GFR SERPL CREATININE-BSD FRML MDRD: 84 ML/MIN/1.73
GLOBULIN UR ELPH-MCNC: 3.6 GM/DL
GLUCOSE BLD-MCNC: 146 MG/DL (ref 65–99)
HCT VFR BLD AUTO: 43.5 % (ref 34–46.6)
HGB BLD-MCNC: 14.2 G/DL (ref 12–15.9)
HOLD SPECIMEN: NORMAL
HOLD SPECIMEN: NORMAL
IMM GRANULOCYTES # BLD AUTO: 0.05 10*3/MM3 (ref 0–0.05)
IMM GRANULOCYTES NFR BLD AUTO: 0.6 % (ref 0–0.5)
LYMPHOCYTES # BLD AUTO: 1.62 10*3/MM3 (ref 0.7–3.1)
LYMPHOCYTES NFR BLD AUTO: 19.4 % (ref 19.6–45.3)
MCH RBC QN AUTO: 26 PG (ref 26.6–33)
MCHC RBC AUTO-ENTMCNC: 32.6 G/DL (ref 31.5–35.7)
MCV RBC AUTO: 79.7 FL (ref 79–97)
MONOCYTES # BLD AUTO: 0.37 10*3/MM3 (ref 0.1–0.9)
MONOCYTES NFR BLD AUTO: 4.4 % (ref 5–12)
NEUTROPHILS # BLD AUTO: 6.2 10*3/MM3 (ref 1.7–7)
NEUTROPHILS NFR BLD AUTO: 74.2 % (ref 42.7–76)
NRBC BLD AUTO-RTO: 0 /100 WBC (ref 0–0.2)
NT-PROBNP SERPL-MCNC: 75 PG/ML (ref 5–900)
PLATELET # BLD AUTO: 208 10*3/MM3 (ref 140–450)
PMV BLD AUTO: 10.1 FL (ref 6–12)
POTASSIUM BLD-SCNC: 4 MMOL/L (ref 3.5–5.2)
PROT SERPL-MCNC: 8 G/DL (ref 6–8.5)
RBC # BLD AUTO: 5.46 10*6/MM3 (ref 3.77–5.28)
SODIUM BLD-SCNC: 140 MMOL/L (ref 136–145)
TROPONIN T SERPL-MCNC: <0.01 NG/ML (ref 0–0.03)
WBC NRBC COR # BLD: 8.36 10*3/MM3 (ref 3.4–10.8)
WHOLE BLOOD HOLD SPECIMEN: NORMAL
WHOLE BLOOD HOLD SPECIMEN: NORMAL

## 2020-02-06 PROCEDURE — 96376 TX/PRO/DX INJ SAME DRUG ADON: CPT

## 2020-02-06 PROCEDURE — 85025 COMPLETE CBC W/AUTO DIFF WBC: CPT | Performed by: EMERGENCY MEDICINE

## 2020-02-06 PROCEDURE — 99284 EMERGENCY DEPT VISIT MOD MDM: CPT

## 2020-02-06 PROCEDURE — 96361 HYDRATE IV INFUSION ADD-ON: CPT

## 2020-02-06 PROCEDURE — 93005 ELECTROCARDIOGRAM TRACING: CPT | Performed by: EMERGENCY MEDICINE

## 2020-02-06 PROCEDURE — 93010 ELECTROCARDIOGRAM REPORT: CPT | Performed by: INTERNAL MEDICINE

## 2020-02-06 PROCEDURE — 25010000002 BUTORPHANOL PER 1 MG: Performed by: EMERGENCY MEDICINE

## 2020-02-06 PROCEDURE — 96374 THER/PROPH/DIAG INJ IV PUSH: CPT

## 2020-02-06 PROCEDURE — 25010000002 PROMETHAZINE PER 50 MG: Performed by: EMERGENCY MEDICINE

## 2020-02-06 PROCEDURE — 84484 ASSAY OF TROPONIN QUANT: CPT | Performed by: EMERGENCY MEDICINE

## 2020-02-06 PROCEDURE — 83880 ASSAY OF NATRIURETIC PEPTIDE: CPT | Performed by: EMERGENCY MEDICINE

## 2020-02-06 PROCEDURE — 96375 TX/PRO/DX INJ NEW DRUG ADDON: CPT

## 2020-02-06 PROCEDURE — 80053 COMPREHEN METABOLIC PANEL: CPT | Performed by: EMERGENCY MEDICINE

## 2020-02-06 PROCEDURE — 71046 X-RAY EXAM CHEST 2 VIEWS: CPT

## 2020-02-06 RX ORDER — BUTALBITAL, ACETAMINOPHEN AND CAFFEINE 50; 325; 40 MG/1; MG/1; MG/1
1 TABLET ORAL EVERY 6 HOURS PRN
Qty: 15 TABLET | Refills: 0 | Status: SHIPPED | OUTPATIENT
Start: 2020-02-06 | End: 2021-01-25

## 2020-02-06 RX ORDER — PROMETHAZINE HYDROCHLORIDE 25 MG/ML
12.5 INJECTION, SOLUTION INTRAMUSCULAR; INTRAVENOUS ONCE
Status: COMPLETED | OUTPATIENT
Start: 2020-02-06 | End: 2020-02-06

## 2020-02-06 RX ORDER — SODIUM CHLORIDE 9 MG/ML
125 INJECTION, SOLUTION INTRAVENOUS CONTINUOUS
Status: DISCONTINUED | OUTPATIENT
Start: 2020-02-06 | End: 2020-02-06 | Stop reason: HOSPADM

## 2020-02-06 RX ORDER — DOXYCYCLINE 100 MG/1
100 CAPSULE ORAL 2 TIMES DAILY
Qty: 14 CAPSULE | Refills: 0 | OUTPATIENT
Start: 2020-02-06 | End: 2020-12-08

## 2020-02-06 RX ORDER — BUTORPHANOL TARTRATE 1 MG/ML
1 INJECTION, SOLUTION INTRAMUSCULAR; INTRAVENOUS ONCE
Status: COMPLETED | OUTPATIENT
Start: 2020-02-06 | End: 2020-02-06

## 2020-02-06 RX ORDER — SODIUM CHLORIDE 0.9 % (FLUSH) 0.9 %
10 SYRINGE (ML) INJECTION AS NEEDED
Status: DISCONTINUED | OUTPATIENT
Start: 2020-02-06 | End: 2020-02-06 | Stop reason: HOSPADM

## 2020-02-06 RX ORDER — LABETALOL HYDROCHLORIDE 5 MG/ML
20 INJECTION, SOLUTION INTRAVENOUS ONCE
Status: COMPLETED | OUTPATIENT
Start: 2020-02-06 | End: 2020-02-06

## 2020-02-06 RX ADMIN — LABETALOL HYDROCHLORIDE 20 MG: 5 INJECTION, SOLUTION INTRAVENOUS at 10:22

## 2020-02-06 RX ADMIN — SODIUM CHLORIDE 50 ML: 9 INJECTION, SOLUTION INTRAVENOUS at 10:25

## 2020-02-06 RX ADMIN — BUTORPHANOL TARTRATE 1 MG: 1 INJECTION, SOLUTION INTRAMUSCULAR; INTRAVENOUS at 10:25

## 2020-02-06 RX ADMIN — BUTORPHANOL TARTRATE 1 MG: 1 INJECTION, SOLUTION INTRAMUSCULAR; INTRAVENOUS at 12:23

## 2020-02-06 RX ADMIN — PROMETHAZINE HYDROCHLORIDE 12.5 MG: 25 INJECTION INTRAMUSCULAR; INTRAVENOUS at 10:25

## 2020-02-06 RX ADMIN — SODIUM CHLORIDE 125 ML/HR: 9 INJECTION, SOLUTION INTRAVENOUS at 10:25

## 2020-02-06 NOTE — ED PROVIDER NOTES
"Subjective   Patient presents emergency department multiple complaints.  Patient states she has been on Diovan for her blood pressure, this is evidently not been helping her in the last 24 to 48 hours.  Patient denies any dietary changes.  Patient states that with when her blood pressure goes up she gets a frontal headache extending to the bitemporal region.  There is no thunderclap nature of this headache.  It is gradual onset.  Patient has had some nausea with this without vomiting.  Patient also notes a on chest tightness and shortness of breath.  Patient has a cath in the last 5 years which was normal.  Patient states that the physician told her at the catheter heart looks like \"a 15-year-old\".  Patient denies any palpitations.  No radiation or chest pain.  No history of COPD.  No fevers have been noted.          Review of Systems   Constitutional: Positive for fatigue. Negative for activity change, appetite change, chills and fever.   HENT: Negative for congestion.    Eyes: Negative.  Negative for photophobia and visual disturbance.   Respiratory: Positive for chest tightness and shortness of breath. Negative for cough.    Cardiovascular: Negative.  Negative for chest pain and palpitations.   Gastrointestinal: Negative.  Negative for abdominal pain, constipation, diarrhea, nausea and vomiting.   Endocrine: Negative.    Genitourinary: Negative.  Negative for decreased urine volume, dysuria, flank pain and hematuria.   Musculoskeletal: Negative.  Negative for arthralgias, back pain, myalgias, neck pain and neck stiffness.   Skin: Negative.  Negative for pallor.   Neurological: Positive for headaches. Negative for dizziness, syncope, weakness, light-headedness and numbness.   Psychiatric/Behavioral: Negative.  Negative for confusion and suicidal ideas. The patient is not nervous/anxious.    All other systems reviewed and are negative.      Past Medical History:   Diagnosis Date   • Anxiety    • Arthritis    • " Arthropathy of lumbar facet joint    • Chronic depression    • Degeneration of lumbar intervertebral disc    • Drug therapy     Other long term (current) drug therapy      • Epilepsy (CMS/HCC)    • Headache    • Hearing loss    • Hypercholesterolemia    • Hypertensive disorder    • Obesity    • Osteoporosis    • Osteoporosis    • Varicose veins of lower extremity        Allergies   Allergen Reactions   • Advil [Ibuprofen]    • Augmentin [Amoxicillin-Pot Clavulanate]    • Biaxin [Clarithromycin]    • Naprosyn [Naproxen]    • Nsaids        Past Surgical History:   Procedure Laterality Date   • CARDIAC CATHETERIZATION  06/05/2013    Cardiac cath 05956 (1)      • INJECTION OF MEDICATION  03/28/2016    Injection for nerve block (2)      • OTHER SURGICAL HISTORY  05/05/2016    Incise spinal column/nerves (1)          Family History   Problem Relation Age of Onset   • Asthma Mother    • Cancer Mother         other   • Diabetes Mother    • Heart disease Mother    • Hyperlipidemia Mother    • Migraines Mother    • Osteoporosis Mother    • Cancer Father         other   • Hyperlipidemia Father    • Diabetes Maternal Grandmother    • Heart disease Maternal Grandfather    • Mental illness Other         Mental Disorder       Social History     Socioeconomic History   • Marital status:      Spouse name: Not on file   • Number of children: Not on file   • Years of education: Not on file   • Highest education level: Not on file   Tobacco Use   • Smoking status: Never Smoker   • Smokeless tobacco: Never Used   Substance and Sexual Activity   • Alcohol use: No   • Drug use: No   • Sexual activity: Defer           Objective   Physical Exam   Constitutional: She is oriented to person, place, and time. She appears well-developed and well-nourished. No distress.   HENT:   Head: Normocephalic and atraumatic.   Nose: Nose normal.   Mouth/Throat: Oropharynx is clear and moist.   Eyes: Conjunctivae and EOM are normal. No scleral  icterus.   Neck: Normal range of motion. Neck supple. No JVD present.   Cardiovascular: Normal rate, regular rhythm, normal heart sounds and intact distal pulses. Exam reveals no gallop and no friction rub.   No murmur heard.  Pulmonary/Chest: Effort normal. No respiratory distress. She has no wheezes. She has no rales. She exhibits no tenderness.   Abdominal: Soft. She exhibits no distension and no mass. There is no tenderness. There is no rebound and no guarding.   Musculoskeletal: Normal range of motion. She exhibits no edema, tenderness or deformity.   Lymphadenopathy:     She has no cervical adenopathy.   Neurological: She is alert and oriented to person, place, and time. No cranial nerve deficit. She exhibits normal muscle tone.   Skin: Skin is warm and dry. Capillary refill takes less than 2 seconds. No rash noted. She is not diaphoretic. No erythema. No pallor.   Psychiatric: She has a normal mood and affect. Her behavior is normal. Judgment and thought content normal.   Nursing note and vitals reviewed.      Procedures           ED Course                                     Labs Reviewed   COMPREHENSIVE METABOLIC PANEL - Abnormal; Notable for the following components:       Result Value    Glucose 146 (*)     Alkaline Phosphatase 147 (*)     All other components within normal limits    Narrative:     GFR Normal >60  Chronic Kidney Disease <60  Kidney Failure <15     CBC WITH AUTO DIFFERENTIAL - Abnormal; Notable for the following components:    RBC 5.46 (*)     MCH 26.0 (*)     Lymphocyte % 19.4 (*)     Monocyte % 4.4 (*)     Immature Grans % 0.6 (*)     All other components within normal limits   BNP (IN-HOUSE) - Normal    Narrative:     Among patients with dyspnea, NT-proBNP is highly sensitive for the detection of acute congestive heart failure. In addition NT-proBNP of <300 pg/ml effectively rules out acute congestive heart failure with 99% negative predictive value.    Results may be falsely decreased  if patient taking Biotin.     TROPONIN (IN-HOUSE) - Normal    Narrative:     Troponin T Reference Range:  <= 0.03 ng/mL-   Negative for AMI  >0.03 ng/mL-     Abnormal for myocardial necrosis.  Clinicians would have to utilize clinical acumen, EKG, Troponin and serial changes to determine if it is an Acute Myocardial Infarction or myocardial injury due to an underlying chronic condition.       Results may be falsely decreased if patient taking Biotin.     RAINBOW DRAW    Narrative:     The following orders were created for panel order [place] Draw.  Procedure                               Abnormality         Status                     ---------                               -----------         ------                     Light Blue Top[[id number]]                                   Final result               Green Top (Gel)[[id number]]                                  Final result               Lavender Top[[id number]]                                     Final result               Gold Top - SST[[id number]]                                   Final result                 Please view results for these tests on the individual orders.   CBC AND DIFFERENTIAL    Narrative:     The following orders were created for panel order CBC & Differential.  Procedure                               Abnormality         Status                     ---------                               -----------         ------                     CBC Auto Differential[[id number]]        Abnormal            Final result                 Please view results for these tests on the individual orders.   LIGHT BLUE TOP   GREEN TOP   LAVENDER TOP   GOLD TOP - SST       XR Chest 2 View   Final Result   No active disease.         Electronically signed by:  Juan Francisco Bliss MD  2/6/2020 10:17 AM   CST Workstation: [id number]        No acute findings.  Patient proved with ED interventions.  Patient be discharged outpatient follow-up with her primary.          [facility]    Final  diagnoses:   Nonintractable headache, unspecified chronicity pattern, unspecified headache type   Upper respiratory tract infection, unspecified type            Alfredito Cuevas MD  02/06/20 [id number]

## 2020-02-06 NOTE — DISCHARGE INSTRUCTIONS
Follow-up with Dr. Lin as needed for further evaluation and management.Return with any new or worsening symptoms, or any concerns.

## 2020-03-17 ENCOUNTER — APPOINTMENT (OUTPATIENT)
Dept: CT IMAGING | Facility: HOSPITAL | Age: 55
End: 2020-03-17

## 2020-03-17 ENCOUNTER — HOSPITAL ENCOUNTER (EMERGENCY)
Facility: HOSPITAL | Age: 55
Discharge: HOME | End: 2020-03-17
Attending: EMERGENCY MEDICINE | Admitting: EMERGENCY MEDICINE

## 2020-03-17 ENCOUNTER — APPOINTMENT (OUTPATIENT)
Dept: GENERAL RADIOLOGY | Facility: HOSPITAL | Age: 55
End: 2020-03-17

## 2020-03-17 VITALS
TEMPERATURE: 97.8 F | DIASTOLIC BLOOD PRESSURE: 66 MMHG | HEART RATE: 74 BPM | WEIGHT: 260 LBS | OXYGEN SATURATION: 95 % | RESPIRATION RATE: 18 BRPM | BODY MASS INDEX: 51.04 KG/M2 | SYSTOLIC BLOOD PRESSURE: 125 MMHG | HEIGHT: 60 IN

## 2020-03-17 DIAGNOSIS — R10.13 EPIGASTRIC PAIN: Primary | ICD-10-CM

## 2020-03-17 DIAGNOSIS — I10 ESSENTIAL HYPERTENSION: ICD-10-CM

## 2020-03-17 DIAGNOSIS — K57.10 DIVERTICULUM OF DUODENUM: ICD-10-CM

## 2020-03-17 DIAGNOSIS — K76.0 FATTY LIVER: ICD-10-CM

## 2020-03-17 LAB
ALBUMIN SERPL-MCNC: 4.4 G/DL (ref 3.5–5.2)
ALBUMIN/GLOB SERPL: 1.2 G/DL
ALP SERPL-CCNC: 134 U/L (ref 39–117)
ALT SERPL W P-5'-P-CCNC: 24 U/L (ref 1–33)
AMYLASE SERPL-CCNC: 42 U/L (ref 28–100)
ANION GAP SERPL CALCULATED.3IONS-SCNC: 14 MMOL/L (ref 5–15)
AST SERPL-CCNC: 29 U/L (ref 1–32)
BACTERIA UR QL AUTO: ABNORMAL /HPF
BASOPHILS # BLD AUTO: 0.05 10*3/MM3 (ref 0–0.2)
BASOPHILS NFR BLD AUTO: 0.5 % (ref 0–1.5)
BILIRUB SERPL-MCNC: 0.6 MG/DL (ref 0.2–1.2)
BILIRUB UR QL STRIP: NEGATIVE
BUN BLD-MCNC: 10 MG/DL (ref 6–20)
BUN/CREAT SERPL: 14.5 (ref 7–25)
CALCIUM SPEC-SCNC: 10.1 MG/DL (ref 8.6–10.5)
CHLORIDE SERPL-SCNC: 99 MMOL/L (ref 98–107)
CLARITY UR: CLEAR
CO2 SERPL-SCNC: 27 MMOL/L (ref 22–29)
COLOR UR: YELLOW
CREAT BLD-MCNC: 0.69 MG/DL (ref 0.57–1)
D-LACTATE SERPL-SCNC: 1.5 MMOL/L (ref 0.5–2)
DEPRECATED RDW RBC AUTO: 39.6 FL (ref 37–54)
EOSINOPHIL # BLD AUTO: 0.04 10*3/MM3 (ref 0–0.4)
EOSINOPHIL NFR BLD AUTO: 0.4 % (ref 0.3–6.2)
ERYTHROCYTE [DISTWIDTH] IN BLOOD BY AUTOMATED COUNT: 13.6 % (ref 12.3–15.4)
GFR SERPL CREATININE-BSD FRML MDRD: 88 ML/MIN/1.73
GLOBULIN UR ELPH-MCNC: 3.7 GM/DL
GLUCOSE BLD-MCNC: 115 MG/DL (ref 65–99)
GLUCOSE UR STRIP-MCNC: NEGATIVE MG/DL
HCT VFR BLD AUTO: 43.8 % (ref 34–46.6)
HGB BLD-MCNC: 14.2 G/DL (ref 12–15.9)
HGB UR QL STRIP.AUTO: ABNORMAL
HOLD SPECIMEN: NORMAL
HYALINE CASTS UR QL AUTO: ABNORMAL /LPF
IMM GRANULOCYTES # BLD AUTO: 0.06 10*3/MM3 (ref 0–0.05)
IMM GRANULOCYTES NFR BLD AUTO: 0.6 % (ref 0–0.5)
KETONES UR QL STRIP: NEGATIVE
LDH SERPL-CCNC: 173 U/L (ref 135–214)
LEUKOCYTE ESTERASE UR QL STRIP.AUTO: ABNORMAL
LIPASE SERPL-CCNC: 17 U/L (ref 13–60)
LYMPHOCYTES # BLD AUTO: 1.74 10*3/MM3 (ref 0.7–3.1)
LYMPHOCYTES NFR BLD AUTO: 16.9 % (ref 19.6–45.3)
MCH RBC QN AUTO: 25.9 PG (ref 26.6–33)
MCHC RBC AUTO-ENTMCNC: 32.4 G/DL (ref 31.5–35.7)
MCV RBC AUTO: 79.9 FL (ref 79–97)
MONOCYTES # BLD AUTO: 0.5 10*3/MM3 (ref 0.1–0.9)
MONOCYTES NFR BLD AUTO: 4.9 % (ref 5–12)
NEUTROPHILS # BLD AUTO: 7.89 10*3/MM3 (ref 1.7–7)
NEUTROPHILS NFR BLD AUTO: 76.7 % (ref 42.7–76)
NITRITE UR QL STRIP: NEGATIVE
NRBC BLD AUTO-RTO: 0 /100 WBC (ref 0–0.2)
PH UR STRIP.AUTO: 7 [PH] (ref 5–9)
PLATELET # BLD AUTO: 236 10*3/MM3 (ref 140–450)
PMV BLD AUTO: 9.5 FL (ref 6–12)
POTASSIUM BLD-SCNC: 4 MMOL/L (ref 3.5–5.2)
PROT SERPL-MCNC: 8.1 G/DL (ref 6–8.5)
PROT UR QL STRIP: NEGATIVE
RBC # BLD AUTO: 5.48 10*6/MM3 (ref 3.77–5.28)
RBC # UR: ABNORMAL /HPF
REF LAB TEST METHOD: ABNORMAL
SODIUM BLD-SCNC: 140 MMOL/L (ref 136–145)
SP GR UR STRIP: 1.01 (ref 1–1.03)
SQUAMOUS #/AREA URNS HPF: ABNORMAL /HPF
TROPONIN T SERPL-MCNC: <0.01 NG/ML (ref 0–0.03)
UROBILINOGEN UR QL STRIP: ABNORMAL
WBC NRBC COR # BLD: 10.28 10*3/MM3 (ref 3.4–10.8)
WBC UR QL AUTO: ABNORMAL /HPF
WHOLE BLOOD HOLD SPECIMEN: NORMAL

## 2020-03-17 PROCEDURE — 81001 URINALYSIS AUTO W/SCOPE: CPT | Performed by: EMERGENCY MEDICINE

## 2020-03-17 PROCEDURE — 80053 COMPREHEN METABOLIC PANEL: CPT | Performed by: EMERGENCY MEDICINE

## 2020-03-17 PROCEDURE — 74177 CT ABD & PELVIS W/CONTRAST: CPT

## 2020-03-17 PROCEDURE — 36415 COLL VENOUS BLD VENIPUNCTURE: CPT | Performed by: EMERGENCY MEDICINE

## 2020-03-17 PROCEDURE — 96374 THER/PROPH/DIAG INJ IV PUSH: CPT

## 2020-03-17 PROCEDURE — 74022 RADEX COMPL AQT ABD SERIES: CPT

## 2020-03-17 PROCEDURE — 83615 LACTATE (LD) (LDH) ENZYME: CPT | Performed by: EMERGENCY MEDICINE

## 2020-03-17 PROCEDURE — 85025 COMPLETE CBC W/AUTO DIFF WBC: CPT | Performed by: EMERGENCY MEDICINE

## 2020-03-17 PROCEDURE — 25010000002 PROMETHAZINE PER 50 MG: Performed by: EMERGENCY MEDICINE

## 2020-03-17 PROCEDURE — 96375 TX/PRO/DX INJ NEW DRUG ADDON: CPT

## 2020-03-17 PROCEDURE — 25010000002 HYDROMORPHONE 1 MG/ML SOLUTION: Performed by: EMERGENCY MEDICINE

## 2020-03-17 PROCEDURE — 25010000002 ONDANSETRON PER 1 MG: Performed by: EMERGENCY MEDICINE

## 2020-03-17 PROCEDURE — 82150 ASSAY OF AMYLASE: CPT | Performed by: EMERGENCY MEDICINE

## 2020-03-17 PROCEDURE — 93010 ELECTROCARDIOGRAM REPORT: CPT | Performed by: INTERNAL MEDICINE

## 2020-03-17 PROCEDURE — 96361 HYDRATE IV INFUSION ADD-ON: CPT

## 2020-03-17 PROCEDURE — 83690 ASSAY OF LIPASE: CPT | Performed by: EMERGENCY MEDICINE

## 2020-03-17 PROCEDURE — 96376 TX/PRO/DX INJ SAME DRUG ADON: CPT

## 2020-03-17 PROCEDURE — 83605 ASSAY OF LACTIC ACID: CPT | Performed by: EMERGENCY MEDICINE

## 2020-03-17 PROCEDURE — 99284 EMERGENCY DEPT VISIT MOD MDM: CPT

## 2020-03-17 PROCEDURE — 25010000002 MORPHINE PER 10 MG: Performed by: EMERGENCY MEDICINE

## 2020-03-17 PROCEDURE — 93005 ELECTROCARDIOGRAM TRACING: CPT | Performed by: EMERGENCY MEDICINE

## 2020-03-17 PROCEDURE — 25010000002 IOPAMIDOL 61 % SOLUTION: Performed by: EMERGENCY MEDICINE

## 2020-03-17 PROCEDURE — 0 DIATRIZOATE MEGLUMINE & SODIUM PER 1 ML: Performed by: EMERGENCY MEDICINE

## 2020-03-17 PROCEDURE — 84484 ASSAY OF TROPONIN QUANT: CPT | Performed by: EMERGENCY MEDICINE

## 2020-03-17 RX ORDER — SUCRALFATE ORAL 1 G/10ML
1 SUSPENSION ORAL
Qty: 420 ML | Refills: 0 | Status: SHIPPED | OUTPATIENT
Start: 2020-03-17 | End: 2020-12-09

## 2020-03-17 RX ORDER — ONDANSETRON 4 MG/1
4 TABLET, ORALLY DISINTEGRATING ORAL EVERY 8 HOURS PRN
Qty: 10 TABLET | Refills: 0 | Status: SHIPPED | OUTPATIENT
Start: 2020-03-17 | End: 2021-01-25

## 2020-03-17 RX ORDER — PANTOPRAZOLE SODIUM 40 MG/10ML
40 INJECTION, POWDER, LYOPHILIZED, FOR SOLUTION INTRAVENOUS ONCE
Status: COMPLETED | OUTPATIENT
Start: 2020-03-17 | End: 2020-03-17

## 2020-03-17 RX ORDER — LANSOPRAZOLE 15 MG/1
15 CAPSULE, DELAYED RELEASE ORAL DAILY
COMMUNITY
End: 2020-12-09

## 2020-03-17 RX ORDER — PROMETHAZINE HYDROCHLORIDE 25 MG/ML
12.5 INJECTION, SOLUTION INTRAMUSCULAR; INTRAVENOUS ONCE
Status: COMPLETED | OUTPATIENT
Start: 2020-03-17 | End: 2020-03-17

## 2020-03-17 RX ORDER — ONDANSETRON 2 MG/ML
4 INJECTION INTRAMUSCULAR; INTRAVENOUS ONCE
Status: COMPLETED | OUTPATIENT
Start: 2020-03-17 | End: 2020-03-17

## 2020-03-17 RX ORDER — LABETALOL HYDROCHLORIDE 5 MG/ML
20 INJECTION, SOLUTION INTRAVENOUS ONCE
Status: COMPLETED | OUTPATIENT
Start: 2020-03-17 | End: 2020-03-17

## 2020-03-17 RX ORDER — DICYCLOMINE HCL 20 MG
20 TABLET ORAL EVERY 6 HOURS PRN
Qty: 8 TABLET | Refills: 0 | Status: SHIPPED | OUTPATIENT
Start: 2020-03-17 | End: 2021-01-25

## 2020-03-17 RX ORDER — GABAPENTIN 600 MG/1
600 TABLET ORAL NIGHTLY
COMMUNITY
End: 2020-12-09

## 2020-03-17 RX ORDER — SODIUM CHLORIDE 0.9 % (FLUSH) 0.9 %
10 SYRINGE (ML) INJECTION AS NEEDED
Status: DISCONTINUED | OUTPATIENT
Start: 2020-03-17 | End: 2020-03-17 | Stop reason: HOSPADM

## 2020-03-17 RX ORDER — SODIUM CHLORIDE 9 MG/ML
125 INJECTION, SOLUTION INTRAVENOUS CONTINUOUS
Status: DISCONTINUED | OUTPATIENT
Start: 2020-03-17 | End: 2020-03-17 | Stop reason: HOSPADM

## 2020-03-17 RX ORDER — LORATADINE 10 MG/1
10 CAPSULE, LIQUID FILLED ORAL DAILY
COMMUNITY

## 2020-03-17 RX ADMIN — PROMETHAZINE HYDROCHLORIDE 12.5 MG: 25 INJECTION INTRAMUSCULAR; INTRAVENOUS at 20:17

## 2020-03-17 RX ADMIN — MORPHINE SULFATE 4 MG: 4 INJECTION, SOLUTION INTRAMUSCULAR; INTRAVENOUS at 20:20

## 2020-03-17 RX ADMIN — HYDROMORPHONE HYDROCHLORIDE 1 MG: 1 INJECTION, SOLUTION INTRAMUSCULAR; INTRAVENOUS; SUBCUTANEOUS at 18:35

## 2020-03-17 RX ADMIN — DIATRIZOATE MEGLUMINE AND DIATRIZOATE SODIUM 120 ML: 660; 100 LIQUID ORAL; RECTAL at 18:15

## 2020-03-17 RX ADMIN — LABETALOL HYDROCHLORIDE 20 MG: 5 INJECTION INTRAVENOUS at 20:22

## 2020-03-17 RX ADMIN — PANTOPRAZOLE SODIUM 40 MG: 40 INJECTION, POWDER, FOR SOLUTION INTRAVENOUS at 17:23

## 2020-03-17 RX ADMIN — SODIUM CHLORIDE 125 ML/HR: 900 INJECTION, SOLUTION INTRAVENOUS at 17:22

## 2020-03-17 RX ADMIN — HYDROMORPHONE HYDROCHLORIDE 1 MG: 1 INJECTION, SOLUTION INTRAMUSCULAR; INTRAVENOUS; SUBCUTANEOUS at 17:22

## 2020-03-17 RX ADMIN — IOPAMIDOL 90 ML: 612 INJECTION, SOLUTION INTRAVENOUS at 19:54

## 2020-03-17 RX ADMIN — ONDANSETRON 4 MG: 2 INJECTION INTRAMUSCULAR; INTRAVENOUS at 17:22

## 2020-03-17 RX ADMIN — SODIUM CHLORIDE 125 ML/HR: 900 INJECTION, SOLUTION INTRAVENOUS at 18:23

## 2020-03-17 NOTE — ED PROVIDER NOTES
"Subjective   55yo female pmh significant htn/hyperlipidemia/tia/pancreatitis/cholecystectomy/hysterectomy/appendectomy presents ED c/o 1d hx acute onset epigastric abdominal pain characterized as \"sharp/squeezing\"/radiating thru to back/exac po intake/neg relieve factors/associated nausea, vomiting, diarrhea.  Pt reports pain is similar to prior pancreatitis.      History provided by:  Patient  Abdominal Pain   Pain location:  Epigastric  Pain quality: sharp and squeezing    Pain radiates to:  Back  Pain severity:  Severe  Onset quality:  Sudden  Duration:  1 day  Timing:  Constant  Context: eating    Relieved by:  Nothing  Associated symptoms: diarrhea, nausea and vomiting        Review of Systems   Constitutional: Negative.    HENT: Negative.    Respiratory: Negative.    Cardiovascular: Negative.    Gastrointestinal: Positive for abdominal pain, diarrhea, nausea and vomiting. Negative for blood in stool.   Musculoskeletal: Negative.    Skin: Negative.    All other systems reviewed and are negative.      Past Medical History:   Diagnosis Date   • Anxiety    • Arthritis    • Arthropathy of lumbar facet joint    • Chronic depression    • Degeneration of lumbar intervertebral disc    • Drug therapy     Other long term (current) drug therapy      • Epilepsy (CMS/HCC)    • Headache    • Hearing loss    • Hypercholesterolemia    • Hypertensive disorder    • Obesity    • Osteoporosis    • Osteoporosis    • Varicose veins of lower extremity        Allergies   Allergen Reactions   • Advil [Ibuprofen]    • Augmentin [Amoxicillin-Pot Clavulanate]    • Biaxin [Clarithromycin]    • Naprosyn [Naproxen]    • Nsaids        Past Surgical History:   Procedure Laterality Date   • CARDIAC CATHETERIZATION  06/05/2013    Cardiac cath 60438 (1)      • INJECTION OF MEDICATION  03/28/2016    Injection for nerve block (2)      • OTHER SURGICAL HISTORY  05/05/2016    Incise spinal column/nerves (1)          Family History   Problem Relation " Age of Onset   • Asthma Mother    • Cancer Mother         other   • Diabetes Mother    • Heart disease Mother    • Hyperlipidemia Mother    • Migraines Mother    • Osteoporosis Mother    • Cancer Father         other   • Hyperlipidemia Father    • Diabetes Maternal Grandmother    • Heart disease Maternal Grandfather    • Mental illness Other         Mental Disorder       Social History     Socioeconomic History   • Marital status:      Spouse name: Not on file   • Number of children: Not on file   • Years of education: Not on file   • Highest education level: Not on file   Tobacco Use   • Smoking status: Never Smoker   • Smokeless tobacco: Never Used   Substance and Sexual Activity   • Alcohol use: No   • Drug use: No   • Sexual activity: Defer           Objective   Physical Exam   Constitutional: She is oriented to person, place, and time. She appears well-developed and well-nourished.   HENT:   Head: Normocephalic and atraumatic.   Mouth/Throat: Oropharynx is clear and moist.   Eyes: Pupils are equal, round, and reactive to light.   Neck: Normal range of motion. Neck supple. No JVD present. No tracheal deviation present.   Cardiovascular: Normal rate, regular rhythm, normal heart sounds and intact distal pulses. Exam reveals no gallop and no friction rub.   No murmur heard.  Pulmonary/Chest: Effort normal and breath sounds normal. She has no wheezes. She has no rales.   Abdominal: Soft. Normal appearance and bowel sounds are normal. There is tenderness in the epigastric area. There is guarding. There is no rigidity, no rebound, no CVA tenderness, no tenderness at McBurney's point and negative Gomez's sign.       Musculoskeletal: Normal range of motion.   Lymphadenopathy:     She has no cervical adenopathy.   Neurological: She is alert and oriented to person, place, and time.   Nursing note and vitals reviewed.      Procedures           ED Course      Labs Reviewed   COMPREHENSIVE METABOLIC PANEL -  Abnormal; Notable for the following components:       Result Value    Glucose 115 (*)     Alkaline Phosphatase 134 (*)     All other components within normal limits    Narrative:     GFR Normal >60  Chronic Kidney Disease <60  Kidney Failure <15     URINALYSIS W/ MICROSCOPIC IF INDICATED (NO CULTURE) - Abnormal; Notable for the following components:    Blood, UA Small (1+) (*)     Leuk Esterase, UA Small (1+) (*)     All other components within normal limits   CBC WITH AUTO DIFFERENTIAL - Abnormal; Notable for the following components:    RBC 5.48 (*)     MCH 25.9 (*)     Neutrophil % 76.7 (*)     Lymphocyte % 16.9 (*)     Monocyte % 4.9 (*)     Immature Grans % 0.6 (*)     Neutrophils, Absolute 7.89 (*)     Immature Grans, Absolute 0.06 (*)     All other components within normal limits   URINALYSIS, MICROSCOPIC ONLY - Abnormal; Notable for the following components:    RBC, UA 0-2 (*)     Bacteria, UA 2+ (*)     All other components within normal limits   AMYLASE - Normal   LIPASE - Normal   LACTIC ACID, PLASMA - Normal   TROPONIN (IN-HOUSE) - Normal    Narrative:     Troponin T Reference Range:  <= 0.03 ng/mL-   Negative for AMI  >0.03 ng/mL-     Abnormal for myocardial necrosis.  Clinicians would have to utilize clinical acumen, EKG, Troponin and serial changes to determine if it is an Acute Myocardial Infarction or myocardial injury due to an underlying chronic condition.       Results may be falsely decreased if patient taking Biotin.     LACTATE DEHYDROGENASE   CBC AND DIFFERENTIAL    Narrative:     The following orders were created for panel order CBC & Differential.  Procedure                               Abnormality         Status                     ---------                               -----------         ------                     CBC Auto Differential[[id number]]        Abnormal            Final result                 Please view results for these tests on the individual orders.   EXTRA TUBES     Narrative:     The following orders were created for panel order Extra Tubes.  Procedure                               Abnormality         Status                     ---------                               -----------         ------                     Light Blue Top[[id number]]                                   Final result               Gold Top - SST[[id number]]                                   Final result                 Please view results for these tests on the individual orders.   LIGHT BLUE TOP   GOLD TOP - SST     Xr Abdomen 2+ Vw With Chest 1 Vw    Result Date: 3/17/2020  Narrative: Procedure: Acute abdomen series with CXR Reason for exam: abd pain Findings: Calcified precarinal lymph nodes consistent with old granulomatous disease. Cardiac and pulmonary vasculature is normal. Lungs are clear. Pleural spaces are normal. No acute osseous abnormality. Bony structures of abdomen reveal no acute abnormality. Soft tissue structures normal. No pathologic calcification. Nonobstructive bowel gas pattern. No free intraperitoneal air.     Impression: Impression: Negative acute abdomen series. Electronically signed by:  David Tristan MD  3/17/2020 5:06 PM CDT Workstation: [id number]    Ct Abdomen Pelvis With Contrast    Result Date: 3/17/2020  Narrative: CT ABDOMEN AND PELVIS WITH CONTRAST HISTORY: Abdominal pain. Following the oral and intravenous administration of contrast, axial spiral scans of the abdomen and pelvis were obtained. Coronal and sagital reconstructions were preformed. This exam was performed according to our departmental dose-optimization program, which includes automated exposure control, adjustment of the mA and/or kV according to patient size and/or use of iterative reconstruction technique. DLP: 1426.70 COMPARISON: None FINDINGS: Scans of the lung bases demonstrate minimal dependent atelectasis. Cardiomegaly. No acute osseous abnormality. Degenerative changes and degenerative disc disease in the  thoracic and lumbar spine. Fatty infiltration of the liver. Cholecystectomy. Splenic granulomata. The pancreas is unremarkable. The adrenal glands are unremarkable. No renal or ureteral calculi. No renal mass. No hydronephrosis. Unremarkable bladder. No pelvic mass. No abdominal aortic aneurysm. No adenopathy. No bowel obstruction. 1.3 cm diverticulum second segment of the duodenum. Appendectomy. No free air. No free fluid. 4.2 cm fat-containing midline ventral hernia just superior to the umbilicus.     Impression: CONCLUSION: Fatty infiltration of the liver. 1.3 cm diverticulum second segment of the duodenum. 4.2 cm fat-containing midline ventral hernia just superior to the umbilicus. Cholecystectomy. Appendectomy. Cardiomegaly. 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 CT abd/pelvis negative acute abnormality. No evidence pancreatitis/bowel obstruction. Repeat abdominal exam at time of discharge: abd soft nontender. Neg r/r/g/hsm. No evidence peritonitis. Stable discharge w/precautions provided.       Amount and/or Complexity of Data Reviewed  Clinical lab tests: reviewed  Tests in the radiology section of CPT®: reviewed  Tests in the medicine section of CPT®: reviewed        Final diagnoses:   Epigastric pain   Fatty liver   Diverticulum of duodenum   Essential hypertension            Sergei Vaughn MD  03/17/20 [id number]

## 2020-03-18 NOTE — ED NOTES
Stable for DC. DC to lobby via w/c to await transportation home. Family 30 mins away per pt report. Pt verbalized understanding DC instructions. DC instructions and prescriptions left with security and informed pt is not to drive.     Nikia Mckeon RN  03/17/20 0441

## 2020-03-18 NOTE — DISCHARGE INSTRUCTIONS
Clear liquid diet x24hrs  Return ED fever, abdominal pain, vomiting, dehydration, bleeding, worse condition, other concerns

## 2020-04-22 ENCOUNTER — OFFICE VISIT (OUTPATIENT)
Dept: CARDIOLOGY | Facility: CLINIC | Age: 55
End: 2020-04-22

## 2020-04-22 VITALS
SYSTOLIC BLOOD PRESSURE: 116 MMHG | OXYGEN SATURATION: 98 % | DIASTOLIC BLOOD PRESSURE: 70 MMHG | WEIGHT: 262.3 LBS | HEART RATE: 84 BPM | HEIGHT: 60 IN | BODY MASS INDEX: 51.5 KG/M2

## 2020-04-22 DIAGNOSIS — E78.2 MIXED HYPERLIPIDEMIA: ICD-10-CM

## 2020-04-22 DIAGNOSIS — R01.1 HEART MURMUR: Primary | ICD-10-CM

## 2020-04-22 DIAGNOSIS — I10 ESSENTIAL HYPERTENSION: ICD-10-CM

## 2020-04-22 PROCEDURE — 99204 OFFICE O/P NEW MOD 45 MIN: CPT | Performed by: INTERNAL MEDICINE

## 2020-04-22 RX ORDER — ROSUVASTATIN CALCIUM 10 MG/1
10 TABLET, COATED ORAL DAILY
Qty: 90 TABLET | Refills: 3 | Status: SHIPPED | OUTPATIENT
Start: 2020-04-22 | End: 2020-12-09

## 2020-04-22 RX ORDER — VALSARTAN 160 MG/1
160 TABLET ORAL DAILY
Qty: 30 TABLET | Refills: 6
Start: 2020-04-22 | End: 2020-11-21 | Stop reason: SDUPTHER

## 2020-04-22 RX ORDER — METOPROLOL SUCCINATE 25 MG/1
25 TABLET, EXTENDED RELEASE ORAL DAILY
Qty: 30 TABLET | Refills: 6
Start: 2020-04-22 | End: 2020-12-09

## 2020-04-22 NOTE — PROGRESS NOTES
Haven Mahoney  54 y.o. female    04/22/2020  1. Heart murmur    2. Mixed hyperlipidemia    3. Essential hypertension        History of Present Illness  Haven Mahoney is a 54-year-old female who is being seen by me after very long interval.  She was referred by Dr. Lin for assessment of a heart murmur.  The patient has longstanding history of hypertension, hyperlipidemia and TIA in the past.  Recently she had issues with difficult control blood pressure and Dr. Lin increase the dose of Diovan to 160 mg daily and metoprolol succinate 25 mg daily was added.  This seems to have helped her blood pressure and was in the optimal range today.  The patient denied any chest pain or shortness of breath and has been evaluated for chest pain by me back in 2013 when she underwent cardiac catheterization which showed no significant epicardial coronary artery disease.  Her LV systolic function has been normal in the past.  She has been maintained on clopidogrel since her TIA about 5 years prior.    The patient apparently has had a heart murmur going back several years and no history of rheumatic valvular disease, congenital heart disease.  Her history was reviewed in detail.    SUBJECTIVE    Allergies   Allergen Reactions   • Advil [Ibuprofen]    • Augmentin [Amoxicillin-Pot Clavulanate]    • Biaxin [Clarithromycin]    • Naprosyn [Naproxen]    • Nsaids          Past Medical History:   Diagnosis Date   • Anxiety    • Arthritis    • Arthropathy of lumbar facet joint    • Chronic depression    • Degeneration of lumbar intervertebral disc    • Drug therapy     Other long term (current) drug therapy      • Epilepsy (CMS/[facility])    • Headache    • Hearing loss    • Hypercholesterolemia    • Hypertensive disorder    • Obesity    • Osteoporosis    • Osteoporosis    • Varicose veins of lower extremity          Past Surgical History:   Procedure Laterality Date   • CARDIAC CATHETERIZATION  06/05/2013    Cardiac cath 06186 (1)      •  INJECTION OF MEDICATION  03/28/2016    Injection for nerve block (2)      • OTHER SURGICAL HISTORY  05/05/2016    Incise spinal column/nerves (1)            Family History   Problem Relation Age of Onset   • Asthma Mother    • Cancer Mother         other   • Diabetes Mother    • Heart disease Mother    • Hyperlipidemia Mother    • Migraines Mother    • Osteoporosis Mother    • Cancer Father         other   • Hyperlipidemia Father    • Diabetes Maternal Grandmother    • Heart disease Maternal Grandfather    • Mental illness Other         Mental Disorder         Social History     Socioeconomic History   • Marital status:      Spouse name: Not on file   • Number of children: Not on file   • Years of education: Not on file   • Highest education level: Not on file   Tobacco Use   • Smoking status: Never Smoker   • Smokeless tobacco: Never Used   Substance and Sexual Activity   • Alcohol use: No   • Drug use: No   • Sexual activity: Defer         Current Outpatient Medications   Medication Sig Dispense Refill   • butalbital-acetaminophen-caffeine (FIORICET, ESGIC) -40 MG per tablet Take 1 tablet by mouth Every 6 (Six) Hours As Needed for Headache or Migraine. 15 tablet 0   • cetirizine (ZyrTEC) 10 MG tablet Take 10 mg by mouth daily.     • cholecalciferol (VITAMIN D3) 1000 UNITS tablet Take 1,000 Units by mouth Daily.     • dicyclomine (BENTYL) 20 MG tablet Take 1 tablet by mouth Every 6 (Six) Hours As Needed (abdominal pain). 8 tablet 0   • doxycycline (MONODOX) 100 MG capsule Take 1 capsule by mouth 2 (Two) Times a Day. 14 capsule 0   • Esomeprazole Magnesium (NEXIUM PO) Take  by mouth every night.     • fluconazole (DIFLUCAN) 150 MG tablet      • gabapentin (NEURONTIN) 300 MG capsule Take 300 mg by mouth Daily.     • gabapentin (NEURONTIN) 600 MG tablet Take 600 mg by mouth Every Night.     • HYDROcodone-acetaminophen (NORCO) 7.5-325 MG per tablet Take 1 tablet by mouth 5 (Five) Times a Day.     •  "lansoprazole (Prevacid 24HR) 15 MG capsule Take 15 mg by mouth Daily.     • Loratadine (Claritin) 10 MG capsule Take 10 mg by mouth Daily.     • ondansetron ODT (ZOFRAN-ODT) 4 MG disintegrating tablet Place 1 tablet on the tongue Every 8 (Eight) Hours As Needed for Nausea or Vomiting. 10 tablet 0   • pantoprazole (PROTONIX) 40 MG EC tablet      • raloxifene (EVISTA) 60 MG tablet Take 60 mg by mouth daily.     • sucralfate (CARAFATE) 1 GM/10ML suspension Take 10 mL by mouth 4 (Four) Times a Day With Meals & at Bedtime. 420 mL 0   • clopidogrel (PLAVIX) 75 MG tablet      • metoprolol succinate XL (TOPROL-XL) 25 MG 24 hr tablet Take 1 tablet by mouth Daily. 30 tablet 6   • ranitidine (ZANTAC) 150 MG capsule      • rosuvastatin (CRESTOR) 10 MG tablet Take 1 tablet by mouth Daily. 90 tablet 3   • valsartan (DIOVAN) 160 MG tablet Take 1 tablet by mouth Daily. 30 tablet 6     No current facility-administered medications for this visit.          OBJECTIVE    /70 (BP Location: Left arm, Patient Position: Sitting, Cuff Size: Large Adult)   Pulse 84   Ht 152.4 cm (60\")   Wt 119 kg (262 lb 4.8 oz)   SpO2 98%   BMI 51.23 kg/m²         Review of Systems     Constitutional:  Denies recent weight loss, weight gain, fever or chills, no change in exercise tolerance     HENT:  hearing loss, no epistaxis, hoarseness, or difficulty speaking.     Eyes: Wears eyeglasses or contact lenses     Respiratory:  Denies dyspnea with exertion,no cough, wheezing, or hemoptysis.     Cardiovascular: Negative for palpations, chest pain, orthopnea, PND, peripheral edema, syncope, or claudication.     Gastrointestinal:  Denies change in bowel habits, dyspepsia, ulcer disease, hematochezia, or melena.     Endocrine: Negative for cold intolerance, heat intolerance, polydipsia, polyphagia and polyuria. Hyperlipidemia    Genitourinary: Negative.      Musculoskeletal: DJD    Skin:  Denies any change in hair or nails, rashes, or skin lesions. "     Allergic/Immunologic: Negative.  Negative for environmental allergies, food allergies and immunocompromised state.     Neurological:  history of recurrent headaches, seizure disorder. H/o TIA     Hematological: Denies any food allergies, seasonal allergies, bleeding disorders, or lymphadenopathy.     Psychiatric/Behavioral: history of depression, no substance abuse, or change in cognitive function.         Physical Exam     Constitutional: Cooperative, alert and oriented, in no acute distress. Obese    HENT:   Head: Normocephalic, normal hair patterns, no masses or tenderness.  Ears, Nose, and Throat: No gross abnormalities. No pallor or cyanosis.    Eyes: EOMS intact, PERRL, conjunctivae and lids unremarkable. Fundoscopic exam and visual fields not performed.   Neck: No palpable masses or adenopathy, no thyromegaly, no JVD, carotid pulses are full and equal bilaterally and without  Bruits.     Cardiovascular: Regular rhythm, S1 and S2 normal, no S3 or S4. 2/6 systolic murmur, no gallops, or rubs detected.     Pulmonary/Chest: Chest: normal symmetry, normal respiratory excursion, no intercostal retraction, no use of accessory muscles.            Pulmonary: Normal breath sounds. No rales or ronchi.    Abdominal: Abdomen soft, bowel sounds normoactive, no masses, no hepatosplenomegaly, non-tender, no bruits.     Musculoskeletal: No deformities, clubbing, cyanosis, erythema, or edema observed.     Neurological: No gross motor or sensory deficits noted, affect appropriate, oriented to time, person, place.     Skin: Warm and dry to the touch, no apparent skin lesions or masses noted.     Psychiatric: She has a normal mood and affect. Her behavior is normal. Judgment and thought content normal.         Procedures      Lab Results   Component Value Date    WBC 10.28 03/17/2020    HGB 14.2 03/17/2020    HCT 43.8 03/17/2020    MCV 79.9 03/17/2020     03/17/2020     Lab Results   Component Value Date    GLUCOSE 115  (H) 03/17/2020    BUN 10 03/17/2020    CREATININE 0.69 03/17/2020    EGFRIFNONA 88 03/17/2020    BCR 14.5 03/17/2020    CO2 27.0 03/17/2020    CALCIUM 10.1 03/17/2020    ALBUMIN 4.40 03/17/2020    AST 29 03/17/2020    ALT 24 03/17/2020     Lab Results   Component Value Date    CHOL 200 01/21/2019     Lab Results   Component Value Date    TRIG 251 (H) 01/21/2019     Lab Results   Component Value Date    HDL 28 (L) 01/21/2019     No components found for: LDLCALC  Lab Results   Component Value Date     (H) 01/21/2019     No results found for: HDLLDLRATIO  No components found for: CHOLHDL  Lab Results   Component Value Date    HGBA1C 6.5 (H) 07/22/2019     Lab Results   Component Value Date    TSH 2.27 01/21/2019           ASSESSMENT AND PLAN  Haven Mahoney is a 54-year-old female with obesity, hypertension, hyperlipidemia, TIA in the past, history of headaches, with no previous documented coronary artery disease.  Her blood pressure is in the normal range on her present medicines which includes valsartan and metoprolol succinate.  These have been continued.  For an additional lipid profile I have started her on Crestor 10 mg daily.  She was on Lipitor in the past but has not taken this in several months.  To further evaluate her heart murmur an Echocardiogram is being arranged.  Faint murmur is most likely secondary to aortic valve sclerosis.  Suspicion for hemodynamically significant valvular heart disease or congenital heart disease is low.  Further recommendations will follow.  Thank you for asked me to see this patient.    Haven was seen today for hypertension.    Diagnoses and all orders for this visit:    Heart murmur  -     Adult Transthoracic Echo Complete W/ Cont if Necessary Per Protocol; Future    Mixed hyperlipidemia  -     Adult Transthoracic Echo Complete W/ Cont if Necessary Per Protocol; Future    Essential hypertension  -     Adult Transthoracic Echo Complete W/ Cont if Necessary Per Protocol;  Future    Other orders  -     valsartan (DIOVAN) 160 MG tablet; Take 1 tablet by mouth Daily.  -     metoprolol succinate XL (TOPROL-XL) 25 MG 24 hr tablet; Take 1 tablet by mouth Daily.  -     rosuvastatin (CRESTOR) 10 MG tablet; Take 1 tablet by mouth Daily.        Patient's Body mass index is 51.23 kg/m². BMI is above normal parameters. Recommendations include: exercise counseling and nutrition counseling.      Haven Mahoney  reports that she has never smoked. She has never used smokeless tobacco..        Amna Yu MD  4/22/2020  14:34  Answers for HPI/ROS submitted by the patient on 4/21/2020   What is the primary reason for your visit?: Other  Please describe your symptoms.: Heart Murmur heart feels like it flutters and takes my breath then I feel pressure in my chest  Have you had these symptoms before?: Yes  How long have you been having these symptoms?: Other  Please list any medications you are currently taking for this condition.: Diovan 160 mg     81 mg asprin.         Gabapentin 300 mg, Narco 7/325.

## 2020-06-16 DIAGNOSIS — E78.2 MIXED HYPERLIPIDEMIA: Primary | ICD-10-CM

## 2020-06-16 NOTE — PROGRESS NOTES
Echo results discussed with patient. Mild aortic stenosis. Patient made aware. Order placed for lipid panel ok per girma. Patient aware.

## 2020-11-23 RX ORDER — VALSARTAN 160 MG/1
160 TABLET ORAL DAILY
Qty: 30 TABLET | Refills: 6
Start: 2020-11-23 | End: 2020-12-09

## 2020-12-08 ENCOUNTER — HOSPITAL ENCOUNTER (EMERGENCY)
Facility: HOSPITAL | Age: 55
Discharge: HOME | End: 2020-12-08
Attending: STUDENT IN AN ORGANIZED HEALTH CARE EDUCATION/TRAINING PROGRAM | Admitting: STUDENT IN AN ORGANIZED HEALTH CARE EDUCATION/TRAINING PROGRAM

## 2020-12-08 ENCOUNTER — APPOINTMENT (OUTPATIENT)
Dept: GENERAL RADIOLOGY | Facility: HOSPITAL | Age: 55
End: 2020-12-08

## 2020-12-08 VITALS
TEMPERATURE: 98.1 F | DIASTOLIC BLOOD PRESSURE: 84 MMHG | SYSTOLIC BLOOD PRESSURE: 162 MMHG | HEIGHT: 60 IN | WEIGHT: 273 LBS | HEART RATE: 89 BPM | OXYGEN SATURATION: 97 % | BODY MASS INDEX: 53.6 KG/M2 | RESPIRATION RATE: 20 BRPM

## 2020-12-08 DIAGNOSIS — L08.9 LEFT FOOT INFECTION: Primary | ICD-10-CM

## 2020-12-08 LAB
ALBUMIN SERPL-MCNC: 4 G/DL (ref 3.5–5.2)
ALBUMIN/GLOB SERPL: 1.1 G/DL
ALP SERPL-CCNC: 103 U/L (ref 39–117)
ALT SERPL W P-5'-P-CCNC: 20 U/L (ref 1–33)
ANION GAP SERPL CALCULATED.3IONS-SCNC: 13 MMOL/L (ref 5–15)
AST SERPL-CCNC: 20 U/L (ref 1–32)
BASOPHILS # BLD AUTO: 0.07 10*3/MM3 (ref 0–0.2)
BASOPHILS NFR BLD AUTO: 0.5 % (ref 0–1.5)
BILIRUB SERPL-MCNC: 0.7 MG/DL (ref 0–1.2)
BUN SERPL-MCNC: 6 MG/DL (ref 6–20)
BUN/CREAT SERPL: 8.6 (ref 7–25)
CALCIUM SPEC-SCNC: 9.7 MG/DL (ref 8.6–10.5)
CHLORIDE SERPL-SCNC: 101 MMOL/L (ref 98–107)
CO2 SERPL-SCNC: 26 MMOL/L (ref 22–29)
CREAT SERPL-MCNC: 0.7 MG/DL (ref 0.57–1)
DEPRECATED RDW RBC AUTO: 42.2 FL (ref 37–54)
EOSINOPHIL # BLD AUTO: 0.13 10*3/MM3 (ref 0–0.4)
EOSINOPHIL NFR BLD AUTO: 1 % (ref 0.3–6.2)
ERYTHROCYTE [DISTWIDTH] IN BLOOD BY AUTOMATED COUNT: 13.6 % (ref 12.3–15.4)
GFR SERPL CREATININE-BSD FRML MDRD: 87 ML/MIN/1.73
GLOBULIN UR ELPH-MCNC: 3.8 GM/DL
GLUCOSE SERPL-MCNC: 118 MG/DL (ref 65–99)
HCT VFR BLD AUTO: 45.4 % (ref 34–46.6)
HGB BLD-MCNC: 14.6 G/DL (ref 12–15.9)
HOLD SPECIMEN: NORMAL
IMM GRANULOCYTES # BLD AUTO: 0.09 10*3/MM3 (ref 0–0.05)
IMM GRANULOCYTES NFR BLD AUTO: 0.7 % (ref 0–0.5)
LYMPHOCYTES # BLD AUTO: 1.76 10*3/MM3 (ref 0.7–3.1)
LYMPHOCYTES NFR BLD AUTO: 13.3 % (ref 19.6–45.3)
MCH RBC QN AUTO: 27.4 PG (ref 26.6–33)
MCHC RBC AUTO-ENTMCNC: 32.2 G/DL (ref 31.5–35.7)
MCV RBC AUTO: 85.2 FL (ref 79–97)
MONOCYTES # BLD AUTO: 0.77 10*3/MM3 (ref 0.1–0.9)
MONOCYTES NFR BLD AUTO: 5.8 % (ref 5–12)
NEUTROPHILS NFR BLD AUTO: 10.46 10*3/MM3 (ref 1.7–7)
NEUTROPHILS NFR BLD AUTO: 78.7 % (ref 42.7–76)
NRBC BLD AUTO-RTO: 0 /100 WBC (ref 0–0.2)
PLATELET # BLD AUTO: 215 10*3/MM3 (ref 140–450)
PMV BLD AUTO: 9.8 FL (ref 6–12)
POTASSIUM SERPL-SCNC: 3.4 MMOL/L (ref 3.5–5.2)
PROT SERPL-MCNC: 7.8 G/DL (ref 6–8.5)
RBC # BLD AUTO: 5.33 10*6/MM3 (ref 3.77–5.28)
SODIUM SERPL-SCNC: 140 MMOL/L (ref 136–145)
WBC # BLD AUTO: 13.28 10*3/MM3 (ref 3.4–10.8)
WHOLE BLOOD HOLD SPECIMEN: NORMAL

## 2020-12-08 PROCEDURE — 80053 COMPREHEN METABOLIC PANEL: CPT | Performed by: NURSE PRACTITIONER

## 2020-12-08 PROCEDURE — 73630 X-RAY EXAM OF FOOT: CPT

## 2020-12-08 PROCEDURE — 99283 EMERGENCY DEPT VISIT LOW MDM: CPT

## 2020-12-08 PROCEDURE — 85025 COMPLETE CBC W/AUTO DIFF WBC: CPT | Performed by: NURSE PRACTITIONER

## 2020-12-08 PROCEDURE — 25010000002 ONDANSETRON PER 1 MG: Performed by: NURSE PRACTITIONER

## 2020-12-08 PROCEDURE — 25010000002 CEFTRIAXONE: Performed by: NURSE PRACTITIONER

## 2020-12-08 PROCEDURE — 96375 TX/PRO/DX INJ NEW DRUG ADDON: CPT

## 2020-12-08 PROCEDURE — 25010000002 MORPHINE PER 10 MG: Performed by: NURSE PRACTITIONER

## 2020-12-08 PROCEDURE — 96365 THER/PROPH/DIAG IV INF INIT: CPT

## 2020-12-08 RX ORDER — HYDROCODONE BITARTRATE AND ACETAMINOPHEN 7.5; 325 MG/1; MG/1
1 TABLET ORAL ONCE
Status: COMPLETED | OUTPATIENT
Start: 2020-12-08 | End: 2020-12-08

## 2020-12-08 RX ORDER — SODIUM CHLORIDE 0.9 % (FLUSH) 0.9 %
10 SYRINGE (ML) INJECTION AS NEEDED
Status: DISCONTINUED | OUTPATIENT
Start: 2020-12-08 | End: 2020-12-08 | Stop reason: HOSPADM

## 2020-12-08 RX ORDER — ONDANSETRON 2 MG/ML
4 INJECTION INTRAMUSCULAR; INTRAVENOUS ONCE
Status: COMPLETED | OUTPATIENT
Start: 2020-12-08 | End: 2020-12-08

## 2020-12-08 RX ORDER — CEPHALEXIN 500 MG/1
500 CAPSULE ORAL 3 TIMES DAILY
Qty: 30 CAPSULE | Refills: 0 | Status: SHIPPED | OUTPATIENT
Start: 2020-12-08 | End: 2020-12-18

## 2020-12-08 RX ADMIN — HYDROCODONE BITARTRATE AND ACETAMINOPHEN 1 TABLET: 7.5; 325 TABLET ORAL at 14:12

## 2020-12-08 RX ADMIN — ONDANSETRON HYDROCHLORIDE 4 MG: 2 SOLUTION INTRAMUSCULAR; INTRAVENOUS at 12:57

## 2020-12-08 RX ADMIN — CEFTRIAXONE 2 G: 2 INJECTION, POWDER, FOR SOLUTION INTRAMUSCULAR; INTRAVENOUS at 14:12

## 2020-12-08 RX ADMIN — MORPHINE SULFATE 4 MG: 4 INJECTION INTRAVENOUS at 12:57

## 2020-12-08 NOTE — ED PROVIDER NOTES
Subjective   Patient presents to the ER with c/o redness, swelling to left foot for the past several weeks. She was seen at the urgent care and had Rocephin 2 GM IV x 2 days and was placed on doxycyline. She states her foot was improving until yesterday when she noticed it started back swelling. She states today the skin opened and has drained slightly. Denies history of diabetes. Medications include home Norco 7.5 without relief.           Review of Systems   Cardiovascular: Negative for leg swelling.   Skin: Positive for color change and wound.       Past Medical History:   Diagnosis Date   • Anxiety    • Arthritis    • Arthropathy of lumbar facet joint    • Callus    • Chronic depression    • Degeneration of lumbar intervertebral disc    • Drug therapy     Other long term (current) drug therapy      • Epilepsy (CMS/HCC)    • Headache    • Hearing loss    • Heart problem    • High blood pressure    • Hypercholesterolemia    • Hypertensive disorder    • Obesity    • Osteoporosis    • Osteoporosis    • Varicose veins of lower extremity        Allergies   Allergen Reactions   • Advil [Ibuprofen] Hives   • Augmentin [Amoxicillin-Pot Clavulanate] Hives   • Biaxin [Clarithromycin] Hives   • Naprosyn [Naproxen] Hives   • Nsaids Hives       Past Surgical History:   Procedure Laterality Date   • CARDIAC CATHETERIZATION  06/05/2013    Cardiac cath 44515 (1)      • INJECTION OF MEDICATION  03/28/2016    Injection for nerve block (2)      • OTHER SURGICAL HISTORY  05/05/2016    Incise spinal column/nerves (1)          Family History   Problem Relation Age of Onset   • Asthma Mother    • Cancer Mother         other   • Diabetes Mother    • Heart disease Mother    • Hyperlipidemia Mother    • Migraines Mother    • Osteoporosis Mother    • Cancer Father         other   • Hyperlipidemia Father    • Diabetes Maternal Grandmother    • Cancer Maternal Grandmother    • Heart disease Maternal Grandfather    • Mental illness Other       "   Mental Disorder   • Cancer Maternal Uncle        Social History     Socioeconomic History   • Marital status:      Spouse name: Not on file   • Number of children: Not on file   • Years of education: Not on file   • Highest education level: Not on file   Tobacco Use   • Smoking status: Never Smoker   • Smokeless tobacco: Never Used   Substance and Sexual Activity   • Alcohol use: No   • Drug use: No   • Sexual activity: Defer           Objective    /84 (BP Location: Left arm, Patient Position: Lying)   Pulse 89   Temp 98.1 °F (36.7 °C) (Tympanic)   Resp 20   Ht 152.4 cm (60\")   Wt 124 kg (273 lb)   SpO2 97%   BMI 53.32 kg/m²     Physical Exam  Vitals signs and nursing note reviewed.   Constitutional:       General: She is not in acute distress.     Appearance: Normal appearance. She is well-developed. She is not ill-appearing or toxic-appearing.   Cardiovascular:      Rate and Rhythm: Normal rate and regular rhythm.      Heart sounds: Normal heart sounds. No murmur.   Pulmonary:      Effort: Pulmonary effort is normal. No respiratory distress.      Breath sounds: Normal breath sounds. No wheezing.   Abdominal:      General: There is no distension.   Musculoskeletal: Normal range of motion.         General: Swelling and tenderness present. No deformity or signs of injury.      Right lower leg: No edema.      Left lower leg: No edema.      Comments: Left forefoot swolling with mild redness at the side MTP of great toe. No active drainage. Skin peeling around area with minimal bleeding noted. Tenderness to palpation. Strong pedal pulse   Skin:     General: Skin is warm and dry.      Capillary Refill: Capillary refill takes less than 2 seconds.   Neurological:      Mental Status: She is alert and oriented to person, place, and time.      Coordination: Coordination normal.   Psychiatric:         Behavior: Behavior normal.         Thought Content: Thought content normal.         Judgment: Judgment " normal.         Procedures  Results for orders placed or performed during the hospital encounter of 12/08/20   Comprehensive Metabolic Panel    Specimen: Blood   Result Value Ref Range    Glucose 118 (H) 65 - 99 mg/dL    BUN 6 6 - 20 mg/dL    Creatinine 0.70 0.57 - 1.00 mg/dL    Sodium 140 136 - 145 mmol/L    Potassium 3.4 (L) 3.5 - 5.2 mmol/L    Chloride 101 98 - 107 mmol/L    CO2 26.0 22.0 - 29.0 mmol/L    Calcium 9.7 8.6 - 10.5 mg/dL    Total Protein 7.8 6.0 - 8.5 g/dL    Albumin 4.00 3.50 - 5.20 g/dL    ALT (SGPT) 20 1 - 33 U/L    AST (SGOT) 20 1 - 32 U/L    Alkaline Phosphatase 103 39 - 117 U/L    Total Bilirubin 0.7 0.0 - 1.2 mg/dL    eGFR Non African Amer 87 >60 mL/min/1.73    Globulin 3.8 gm/dL    A/G Ratio 1.1 g/dL    BUN/Creatinine Ratio 8.6 7.0 - 25.0    Anion Gap 13.0 5.0 - 15.0 mmol/L   CBC Auto Differential    Specimen: Blood   Result Value Ref Range    WBC 13.28 (H) 3.40 - 10.80 10*3/mm3    RBC 5.33 (H) 3.77 - 5.28 10*6/mm3    Hemoglobin 14.6 12.0 - 15.9 g/dL    Hematocrit 45.4 34.0 - 46.6 %    MCV 85.2 79.0 - 97.0 fL    MCH 27.4 26.6 - 33.0 pg    MCHC 32.2 31.5 - 35.7 g/dL    RDW 13.6 12.3 - 15.4 %    RDW-SD 42.2 37.0 - 54.0 fl    MPV 9.8 6.0 - 12.0 fL    Platelets 215 140 - 450 10*3/mm3    Neutrophil % 78.7 (H) 42.7 - 76.0 %    Lymphocyte % 13.3 (L) 19.6 - 45.3 %    Monocyte % 5.8 5.0 - 12.0 %    Eosinophil % 1.0 0.3 - 6.2 %    Basophil % 0.5 0.0 - 1.5 %    Immature Grans % 0.7 (H) 0.0 - 0.5 %    Neutrophils, Absolute 10.46 (H) 1.70 - 7.00 10*3/mm3    Lymphocytes, Absolute 1.76 0.70 - 3.10 10*3/mm3    Monocytes, Absolute 0.77 0.10 - 0.90 10*3/mm3    Eosinophils, Absolute 0.13 0.00 - 0.40 10*3/mm3    Basophils, Absolute 0.07 0.00 - 0.20 10*3/mm3    Immature Grans, Absolute 0.09 (H) 0.00 - 0.05 10*3/mm3    nRBC 0.0 0.0 - 0.2 /100 WBC   Light Blue Top   Result Value Ref Range    Extra Tube hold for add-on    Gold Top - SST   Result Value Ref Range    Extra Tube Hold for add-ons.      Xr Foot 3+ View  Left    Result Date: 12/8/2020  Narrative: Procedure:  Left foot    Indication:  Pain and swelling, drainage great toe.. Technique:  Three views   . Prior relevant exam:  None. Evidence of prior bunionectomy. Osteotomy first metatarsal stabilized by two surgical screws. Soft tissue swelling great toe. No evidence of any bony destruction or periosteal reaction. No soft tissue gas collections are identified.     Impression: As above. Electronically signed by:  Manohar Gaspar MD  12/8/2020 12:43 PM [facility] Workstation: [id number]             ED Course  ED Course as of Dec 11 1749   Tue Dec 08, 2020   1400 In to speak with patient. Requesting more pain medication. Will order home dose of pain med. Advised appointment scheduled for tomorrow at 2:30 with Dr. Contreras and patient states she can make that appointment.     [SH]      ED Course User Index  [SH] Brandy Rust APRN                                           [facility]    Final diagnoses:   Left foot infection            Brandy Rust APRN  12/11/20 [id number]

## 2020-12-08 NOTE — DISCHARGE INSTRUCTIONS
Start your prescription today and take as directed. Follow up with Dr. Contreras tomorrow 12/9/20 @ 2:30.

## 2020-12-09 ENCOUNTER — OFFICE VISIT (OUTPATIENT)
Dept: PODIATRY | Facility: CLINIC | Age: 55
End: 2020-12-09

## 2020-12-09 VITALS — HEIGHT: 60 IN | BODY MASS INDEX: 53.6 KG/M2 | WEIGHT: 273 LBS | HEART RATE: 83 BPM | OXYGEN SATURATION: 98 %

## 2020-12-09 DIAGNOSIS — L03.119 CELLULITIS AND ABSCESS OF FOOT: Primary | ICD-10-CM

## 2020-12-09 DIAGNOSIS — L84 FOOT CALLUS: ICD-10-CM

## 2020-12-09 DIAGNOSIS — M79.672 LEFT FOOT PAIN: ICD-10-CM

## 2020-12-09 DIAGNOSIS — L02.619 CELLULITIS AND ABSCESS OF FOOT: Primary | ICD-10-CM

## 2020-12-09 PROCEDURE — 99204 OFFICE O/P NEW MOD 45 MIN: CPT | Performed by: PODIATRIST

## 2020-12-09 RX ORDER — LEVOFLOXACIN 500 MG/1
500 TABLET, FILM COATED ORAL DAILY
Qty: 10 TABLET | Refills: 0 | Status: SHIPPED | OUTPATIENT
Start: 2020-12-09 | End: 2021-01-25

## 2020-12-09 RX ORDER — RANITIDINE 150 MG/1
TABLET ORAL EVERY 12 HOURS SCHEDULED
COMMUNITY
End: 2020-12-09

## 2020-12-09 RX ORDER — PREDNISONE 20 MG/1
TABLET ORAL
COMMUNITY
Start: 2020-11-20 | End: 2020-12-09

## 2020-12-09 RX ORDER — FLUTICASONE PROPIONATE 50 MCG
1 SPRAY, SUSPENSION (ML) NASAL AS NEEDED
COMMUNITY

## 2020-12-09 RX ORDER — SIMVASTATIN 40 MG
TABLET ORAL
COMMUNITY
End: 2020-12-09

## 2020-12-09 RX ORDER — HYALURONATE SODIUM 30 MG/2 ML
2 SYRINGE (ML) INTRAARTICULAR
COMMUNITY
End: 2021-01-25

## 2020-12-09 RX ORDER — PANTOPRAZOLE SODIUM 40 MG/1
TABLET, DELAYED RELEASE ORAL
COMMUNITY
End: 2020-12-09

## 2020-12-09 RX ORDER — GABAPENTIN 100 MG/1
CAPSULE ORAL EVERY 8 HOURS SCHEDULED
COMMUNITY
End: 2020-12-09

## 2020-12-09 RX ORDER — POLYETHYLENE GLYCOL 3350, SODIUM CHLORIDE, SODIUM BICARBONATE, POTASSIUM CHLORIDE 420; 11.2; 5.72; 1.48 G/4L; G/4L; G/4L; G/4L
4000 POWDER, FOR SOLUTION ORAL
COMMUNITY
End: 2021-01-25

## 2020-12-09 RX ORDER — METOPROLOL SUCCINATE 25 MG/1
25 TABLET, EXTENDED RELEASE ORAL
COMMUNITY
Start: 2020-02-11 | End: 2020-12-09

## 2020-12-09 RX ORDER — VALSARTAN 320 MG/1
320 TABLET ORAL DAILY
Status: ON HOLD | COMMUNITY
End: 2021-05-03 | Stop reason: SDUPTHER

## 2020-12-09 RX ORDER — DESIPRAMINE HYDROCHLORIDE 25 MG/1
TABLET ORAL
COMMUNITY
End: 2021-01-25

## 2020-12-09 RX ORDER — LORATADINE 10 MG/1
TABLET ORAL
COMMUNITY
End: 2020-12-09

## 2020-12-09 RX ORDER — RALOXIFENE HYDROCHLORIDE 60 MG/1
TABLET, FILM COATED ORAL
COMMUNITY
End: 2021-01-25

## 2020-12-09 RX ORDER — CELECOXIB 200 MG/1
CAPSULE ORAL EVERY 12 HOURS SCHEDULED
COMMUNITY
End: 2020-12-09

## 2020-12-09 RX ORDER — HYDROCODONE BITARTRATE AND ACETAMINOPHEN 7.5; 325 MG/1; MG/1
1 TABLET ORAL
COMMUNITY
Start: 2020-11-16 | End: 2021-01-25

## 2020-12-09 NOTE — PROGRESS NOTES
Haven Mahoney  1965  55 y.o. female     Patient came to clinic for concern of a callus of left foot that has been infected. Patient went to E.R and was put on a antibiotic.     12/09/2020  Chief Complaint   Patient presents with   • Left Foot - Callouses           History of Present Illness    Haven Mahoney is a 55 y.o. female who presents on follow-up from the emergency department for evaluation of left foot infection.  Patient states that she had cellulitis in early November after picking a callus from her foot.  She was treated in the urgent care with IV Rocephin and started on doxycycline.  She states that her foot did temporarily improve.  However her symptoms seem to recur and she had red streaks from her big toe joint up to her lower calf over the weekend.  She was evaluated in the emergency department yesterday again given IV Rocephin and written for Keflex.  She states she did not  the Keflex due to concern for cross-reactivity of her penicillin allergy.  She denies any recent wounds or systemic infection.  She denies any nausea, vomiting fever or chills.  Patient is nondiabetic.  States that her pain has significantly improved since yesterday.        Past Medical History:   Diagnosis Date   • Anxiety    • Arthritis    • Arthropathy of lumbar facet joint    • Callus    • Chronic depression    • Degeneration of lumbar intervertebral disc    • Drug therapy     Other long term (current) drug therapy      • Epilepsy (CMS/[facility])    • Headache    • Hearing loss    • Heart problem    • High blood pressure    • Hypercholesterolemia    • Hypertensive disorder    • Obesity    • Osteoporosis    • Osteoporosis    • Varicose veins of lower extremity          Past Surgical History:   Procedure Laterality Date   • CARDIAC CATHETERIZATION  06/05/2013    Cardiac cath 06655 (1)      • INJECTION OF MEDICATION  03/28/2016    Injection for nerve block (2)      • OTHER SURGICAL HISTORY  05/05/2016    Verena  spinal column/nerves (1)            Family History   Problem Relation Age of Onset   • Asthma Mother    • Cancer Mother         other   • Diabetes Mother    • Heart disease Mother    • Hyperlipidemia Mother    • Migraines Mother    • Osteoporosis Mother    • Cancer Father         other   • Hyperlipidemia Father    • Diabetes Maternal Grandmother    • Cancer Maternal Grandmother    • Heart disease Maternal Grandfather    • Mental illness Other         Mental Disorder   • Cancer Maternal Uncle          Social History     Socioeconomic History   • Marital status:      Spouse name: Not on file   • Number of children: Not on file   • Years of education: Not on file   • Highest education level: Not on file   Tobacco Use   • Smoking status: Never Smoker   • Smokeless tobacco: Never Used   Substance and Sexual Activity   • Alcohol use: No   • Drug use: No   • Sexual activity: Defer         Current Outpatient Medications   Medication Sig Dispense Refill   • butalbital-acetaminophen-caffeine (FIORICET, ESGIC) -40 MG per tablet Take 1 tablet by mouth Every 6 (Six) Hours As Needed for Headache or Migraine. 15 tablet 0   • cephalexin (KEFLEX) 500 MG capsule Take 1 capsule by mouth 3 (Three) Times a Day for 10 days. 30 capsule 0   • cholecalciferol (VITAMIN D3) 1000 UNITS tablet Take 1,000 Units by mouth Daily.     • clopidogrel (PLAVIX) 75 MG tablet      • desipramine (NORPRAMIN) 25 MG tablet desipramine 25 mg tablet   Take 1 tablet every day by oral route at bedtime.     • dicyclomine (BENTYL) 20 MG tablet Take 1 tablet by mouth Every 6 (Six) Hours As Needed (abdominal pain). 8 tablet 0   • fluconazole (DIFLUCAN) 150 MG tablet      • fluticasone (FLONASE) 50 MCG/ACT nasal spray 1 spray into the nostril(s) as directed by provider Daily.     • gabapentin (NEURONTIN) 300 MG capsule Take 300 mg by mouth Daily.     • Hyaluronan (OrthoVisc) 30 MG/2ML solution prefilled syringe injection 2 mL.     •  "HYDROcodone-acetaminophen (NORCO) 7.5-325 MG per tablet Take 1 tablet by mouth 5 (Five) Times a Day.     • HYDROcodone-acetaminophen (NORCO) 7.5-325 MG per tablet Take 1 tablet by mouth.     • Loratadine (Claritin) 10 MG capsule Take 10 mg by mouth Daily.     • ondansetron ODT (ZOFRAN-ODT) 4 MG disintegrating tablet Place 1 tablet on the tongue Every 8 (Eight) Hours As Needed for Nausea or Vomiting. 10 tablet 0   • pantoprazole (PROTONIX) 40 MG EC tablet      • polyethylene glycol-electrolytes (NULYTELY) 420 g solution 4,000 mL.     • raloxifene (Evista) 60 MG tablet Evista 60 mg tablet   Take 1 tablet every day by oral route.     • valsartan (Diovan) 320 MG tablet Diovan 320 mg tablet   Take 1 tablet every day by oral route.     • levoFLOXacin (Levaquin) 500 MG tablet Take 1 tablet by mouth Daily. 10 tablet 0     No current facility-administered medications for this visit.          OBJECTIVE    Pulse 83   Ht 152.4 cm (60\")   Wt 124 kg (273 lb)   SpO2 98%   BMI 53.32 kg/m²       Review of Systems   Constitutional: Negative.    HENT: Negative.    Eyes: Negative.    Respiratory: Negative.    Cardiovascular: Negative.    Gastrointestinal: Negative.    Endocrine: Negative.    Genitourinary: Negative.    Musculoskeletal: Positive for arthralgias, back pain and joint swelling.        Joint pain and foot pain    Skin: Positive for wound.   Allergic/Immunologic: Negative.    Neurological: Negative.    Hematological: Negative.    Psychiatric/Behavioral: Negative.          Physical Exam   Constitutional: she appears well-developed and well-nourished.   HEENT: Normocephalic. Atraumatic.  CV: No CP. RRR  Resp: Non-labored respirations.  Psychiatric: she has a normal mood and affect. her behavior is normal.         Lower Extremity Exam:  Vascular: DP/PT pulses palpable 2+.   Mild left foot edema  Foot warm  Neuro: Protective sensation intact, b/l.  DTRs intact  Negative Tinel over tarsal tunnel  Integument: Left subfirst " MTPJ hyperkeratosis with pinpoint ulceration.  No fluctuance.  No drainage.  Moderate surrounding erythema to the first MTPJ with streaking to the midfoot.  No  Scaling, ecchymosis  No masses  Musculoskeletal: LE muscle strength 5/5.   Gait normal  Ankle ROM decreased without pain or crepitus  STJ ROM full without pain or crepitus  First MTPJ range of motion full without significant pain or crepitus  Mild tenderness palpation of medial first MTPJ on left.        ASSESSMENT AND PLAN    Diagnoses and all orders for this visit:    1. Cellulitis and abscess of foot (Primary)    2. Left foot pain    3. Foot callus    Other orders  -     levoFLOXacin (Levaquin) 500 MG tablet; Take 1 tablet by mouth Daily.  Dispense: 10 tablet; Refill: 0      Comprehensive foot and ankle exam.-  -Discussed findings consistent with local cellulitis.  Suspect streptococcal infection.  -Due to penicillin allergy Rx Levaquin 500 mg daily for 10 days  -Recheck 1 week        This document has been electronically signed by Gamaliel Starks DPM on December 9, 2020 21:20 CST     EMR Dragon/Transcription disclaimer:   Much of this encounter note is an electronic transcription/translation of spoken language to printed text. The electronic translation of spoken language may permit erroneous, or at times, nonsensical words or phrases to be inadvertently transcribed; Although I have reviewed the note for such errors, some may still exist.    Gamaliel Starks DPM  12/9/2020  21:20 CST

## 2020-12-17 ENCOUNTER — OFFICE VISIT (OUTPATIENT)
Dept: PODIATRY | Facility: CLINIC | Age: 55
End: 2020-12-17

## 2020-12-17 VITALS — HEIGHT: 60 IN | OXYGEN SATURATION: 98 % | HEART RATE: 83 BPM | WEIGHT: 273 LBS | BODY MASS INDEX: 53.6 KG/M2

## 2020-12-17 DIAGNOSIS — L03.119 CELLULITIS AND ABSCESS OF FOOT: Primary | ICD-10-CM

## 2020-12-17 DIAGNOSIS — M79.672 LEFT FOOT PAIN: ICD-10-CM

## 2020-12-17 DIAGNOSIS — L02.619 CELLULITIS AND ABSCESS OF FOOT: Primary | ICD-10-CM

## 2020-12-17 DIAGNOSIS — L84 FOOT CALLUS: ICD-10-CM

## 2020-12-17 PROCEDURE — 99213 OFFICE O/P EST LOW 20 MIN: CPT | Performed by: PODIATRIST

## 2020-12-17 RX ORDER — SULFAMETHOXAZOLE AND TRIMETHOPRIM 800; 160 MG/1; MG/1
1 TABLET ORAL 2 TIMES DAILY
Qty: 20 TABLET | Refills: 0 | Status: SHIPPED | OUTPATIENT
Start: 2020-12-17 | End: 2021-01-21 | Stop reason: SDUPTHER

## 2020-12-17 NOTE — PROGRESS NOTES
Haven Mahoney  1965  55 y.o. female     Patient returned to clinic for a callus of left foot that has been infected. Patient is still taking Levaquin 500mg.     12/17/2020    Chief Complaint   Patient presents with   • Left Foot - Callouses, Follow-up           History of Present Illness    Haven Mahoney is a 55 y.o. female who presents on follow-up of left foot cellulitis.  Has been taking Levaquin since last visit feels her redness is still improved, still having some tenderness near her great toe joint.    Past Medical History:   Diagnosis Date   • Anxiety    • Arthritis    • Arthropathy of lumbar facet joint    • Callus    • Chronic depression    • Degeneration of lumbar intervertebral disc    • Drug therapy     Other long term (current) drug therapy      • Epilepsy (CMS/HCC)    • Headache    • Hearing loss    • Heart problem    • High blood pressure    • Hypercholesterolemia    • Hypertensive disorder    • Obesity    • Osteoporosis    • Osteoporosis    • Varicose veins of lower extremity          Past Surgical History:   Procedure Laterality Date   • CARDIAC CATHETERIZATION  06/05/2013    Cardiac cath 66056 (1)      • INJECTION OF MEDICATION  03/28/2016    Injection for nerve block (2)      • OTHER SURGICAL HISTORY  05/05/2016    Incise spinal column/nerves (1)            Family History   Problem Relation Age of Onset   • Asthma Mother    • Cancer Mother         other   • Diabetes Mother    • Heart disease Mother    • Hyperlipidemia Mother    • Migraines Mother    • Osteoporosis Mother    • Cancer Father         other   • Hyperlipidemia Father    • Diabetes Maternal Grandmother    • Cancer Maternal Grandmother    • Heart disease Maternal Grandfather    • Mental illness Other         Mental Disorder   • Cancer Maternal Uncle          Social History     Socioeconomic History   • Marital status:      Spouse name: Not on file   • Number of children: Not on file   • Years of education: Not on  file   • Highest education level: Not on file   Tobacco Use   • Smoking status: Never Smoker   • Smokeless tobacco: Never Used   Substance and Sexual Activity   • Alcohol use: No   • Drug use: No   • Sexual activity: Defer         Current Outpatient Medications   Medication Sig Dispense Refill   • butalbital-acetaminophen-caffeine (FIORICET, ESGIC) -40 MG per tablet Take 1 tablet by mouth Every 6 (Six) Hours As Needed for Headache or Migraine. 15 tablet 0   • cholecalciferol (VITAMIN D3) 1000 UNITS tablet Take 1,000 Units by mouth Daily.     • clopidogrel (PLAVIX) 75 MG tablet      • desipramine (NORPRAMIN) 25 MG tablet desipramine 25 mg tablet   Take 1 tablet every day by oral route at bedtime.     • dicyclomine (BENTYL) 20 MG tablet Take 1 tablet by mouth Every 6 (Six) Hours As Needed (abdominal pain). 8 tablet 0   • fluconazole (DIFLUCAN) 150 MG tablet      • fluticasone (FLONASE) 50 MCG/ACT nasal spray 1 spray into the nostril(s) as directed by provider Daily.     • gabapentin (NEURONTIN) 300 MG capsule Take 300 mg by mouth Daily.     • Hyaluronan (OrthoVisc) 30 MG/2ML solution prefilled syringe injection 2 mL.     • HYDROcodone-acetaminophen (NORCO) 7.5-325 MG per tablet Take 1 tablet by mouth 5 (Five) Times a Day.     • HYDROcodone-acetaminophen (NORCO) 7.5-325 MG per tablet Take 1 tablet by mouth.     • levoFLOXacin (Levaquin) 500 MG tablet Take 1 tablet by mouth Daily. 10 tablet 0   • Loratadine (Claritin) 10 MG capsule Take 10 mg by mouth Daily.     • ondansetron ODT (ZOFRAN-ODT) 4 MG disintegrating tablet Place 1 tablet on the tongue Every 8 (Eight) Hours As Needed for Nausea or Vomiting. 10 tablet 0   • pantoprazole (PROTONIX) 40 MG EC tablet      • polyethylene glycol-electrolytes (NULYTELY) 420 g solution 4,000 mL.     • raloxifene (Evista) 60 MG tablet Evista 60 mg tablet   Take 1 tablet every day by oral route.     • valsartan (Diovan) 320 MG tablet Diovan 320 mg tablet   Take 1 tablet every day  "by oral route.     • sulfamethoxazole-trimethoprim (Bactrim DS) 800-160 MG per tablet Take 1 tablet by mouth 2 (Two) Times a Day. 20 tablet 0     No current facility-administered medications for this visit.          OBJECTIVE    Pulse 83   Ht 152.4 cm (60\")   Wt 124 kg (273 lb)   SpO2 98%   BMI 53.32 kg/m²       Review of Systems   Constitutional: Negative.    HENT: Negative.    Eyes: Negative.    Respiratory: Negative.    Cardiovascular: Negative.    Gastrointestinal: Negative.    Endocrine: Negative.    Genitourinary: Negative.    Musculoskeletal: Positive for arthralgias, back pain and joint swelling.        Joint pain and foot pain    Skin: Positive for wound.   Allergic/Immunologic: Negative.    Neurological: Negative.    Hematological: Negative.    Psychiatric/Behavioral: Negative.          Physical Exam   Constitutional: she appears well-developed and well-nourished.   HEENT: Normocephalic. Atraumatic.  CV: No CP. RRR  Resp: Non-labored respirations.  Psychiatric: she has a normal mood and affect. her behavior is normal.         Lower Extremity Exam:  Vascular: DP/PT pulses palpable 2+.   Mild left foot edema  Foot warm  Neuro: Protective sensation intact, b/l.  DTRs intact  Negative Tinel over tarsal tunnel  Integument: Left subfirst MTPJ hyperkeratosis.  No fluctuance.  No drainage.  Moderate surrounding erythema to the first MTPJ.  No  Scaling, ecchymosis  No masses  Musculoskeletal: LE muscle strength 5/5.   Gait normal  Ankle ROM decreased without pain or crepitus  STJ ROM full without pain or crepitus  First MTPJ range of motion full without significant pain or crepitus  Mild tenderness palpation of medial first MTPJ on left.        ASSESSMENT AND PLAN    Diagnoses and all orders for this visit:    1. Cellulitis and abscess of foot (Primary)    2. Left foot pain    3. Foot callus    Other orders  -     sulfamethoxazole-trimethoprim (Bactrim DS) 800-160 MG per tablet; Take 1 tablet by mouth 2 (Two) " Times a Day.  Dispense: 20 tablet; Refill: 0      -Comprehensive foot and ankle exam  -Discussed findings consistent with local cellulitis.    -Due to lack of resolution over the past week we will start her on Bactrim DS twice daily  -Recheck 1 week        This document has been electronically signed by Gamaliel Starks DPM on December 20, 2020 12:26 CST     EMR Dragon/Transcription disclaimer:   Much of this encounter note is an electronic transcription/translation of spoken language to printed text. The electronic translation of spoken language may permit erroneous, or at times, nonsensical words or phrases to be inadvertently transcribed; Although I have reviewed the note for such errors, some may still exist.    Gamaliel Starks DPM  12/20/2020  12:26 CST

## 2020-12-22 ENCOUNTER — OFFICE VISIT (OUTPATIENT)
Dept: PODIATRY | Facility: CLINIC | Age: 55
End: 2020-12-22

## 2020-12-22 VITALS — HEIGHT: 60 IN | BODY MASS INDEX: 53.6 KG/M2 | HEART RATE: 76 BPM | OXYGEN SATURATION: 97 % | WEIGHT: 273 LBS

## 2020-12-22 DIAGNOSIS — L02.619 CELLULITIS AND ABSCESS OF FOOT: Primary | ICD-10-CM

## 2020-12-22 DIAGNOSIS — M79.672 LEFT FOOT PAIN: ICD-10-CM

## 2020-12-22 DIAGNOSIS — L03.119 CELLULITIS AND ABSCESS OF FOOT: Primary | ICD-10-CM

## 2020-12-22 PROCEDURE — 99213 OFFICE O/P EST LOW 20 MIN: CPT | Performed by: PODIATRIST

## 2020-12-22 NOTE — PROGRESS NOTES
Haven Mahoney  1965  55 y.o. female     Patient presents to clinic today for a follow up on the left foot.     12/22/2020      Chief Complaint   Patient presents with   • Left Foot - Follow-up           History of Present Illness    Haven Mahoney is a 55 y.o. female who presents on follow-up of left foot cellulitis.  Has completed Levaquin and has been taking Bactrim since last visit.  Still doing better overall albeit with some persistent redness and pain around her great toe joint.    Past Medical History:   Diagnosis Date   • Anxiety    • Arthritis    • Arthropathy of lumbar facet joint    • Callus    • Chronic depression    • Degeneration of lumbar intervertebral disc    • Drug therapy     Other long term (current) drug therapy      • Epilepsy (CMS/HCC)    • Headache    • Hearing loss    • Heart problem    • High blood pressure    • Hypercholesterolemia    • Hypertensive disorder    • Obesity    • Osteoporosis    • Osteoporosis    • Varicose veins of lower extremity          Past Surgical History:   Procedure Laterality Date   • CARDIAC CATHETERIZATION  06/05/2013    Cardiac cath 11053 (1)      • INJECTION OF MEDICATION  03/28/2016    Injection for nerve block (2)      • OTHER SURGICAL HISTORY  05/05/2016    Incise spinal column/nerves (1)            Family History   Problem Relation Age of Onset   • Asthma Mother    • Cancer Mother         other   • Diabetes Mother    • Heart disease Mother    • Hyperlipidemia Mother    • Migraines Mother    • Osteoporosis Mother    • Cancer Father         other   • Hyperlipidemia Father    • Diabetes Maternal Grandmother    • Cancer Maternal Grandmother    • Heart disease Maternal Grandfather    • Mental illness Other         Mental Disorder   • Cancer Maternal Uncle          Social History     Socioeconomic History   • Marital status:      Spouse name: Not on file   • Number of children: Not on file   • Years of education: Not on file   • Highest education  level: Not on file   Tobacco Use   • Smoking status: Never Smoker   • Smokeless tobacco: Never Used   Substance and Sexual Activity   • Alcohol use: No   • Drug use: No   • Sexual activity: Defer         Current Outpatient Medications   Medication Sig Dispense Refill   • butalbital-acetaminophen-caffeine (FIORICET, ESGIC) -40 MG per tablet Take 1 tablet by mouth Every 6 (Six) Hours As Needed for Headache or Migraine. 15 tablet 0   • cholecalciferol (VITAMIN D3) 1000 UNITS tablet Take 1,000 Units by mouth Daily.     • clopidogrel (PLAVIX) 75 MG tablet      • desipramine (NORPRAMIN) 25 MG tablet desipramine 25 mg tablet   Take 1 tablet every day by oral route at bedtime.     • dicyclomine (BENTYL) 20 MG tablet Take 1 tablet by mouth Every 6 (Six) Hours As Needed (abdominal pain). 8 tablet 0   • fluconazole (DIFLUCAN) 150 MG tablet      • fluticasone (FLONASE) 50 MCG/ACT nasal spray 1 spray into the nostril(s) as directed by provider Daily.     • gabapentin (NEURONTIN) 300 MG capsule Take 300 mg by mouth Daily.     • Hyaluronan (OrthoVisc) 30 MG/2ML solution prefilled syringe injection 2 mL.     • HYDROcodone-acetaminophen (NORCO) 7.5-325 MG per tablet Take 1 tablet by mouth 5 (Five) Times a Day.     • HYDROcodone-acetaminophen (NORCO) 7.5-325 MG per tablet Take 1 tablet by mouth.     • levoFLOXacin (Levaquin) 500 MG tablet Take 1 tablet by mouth Daily. 10 tablet 0   • Loratadine (Claritin) 10 MG capsule Take 10 mg by mouth Daily.     • ondansetron ODT (ZOFRAN-ODT) 4 MG disintegrating tablet Place 1 tablet on the tongue Every 8 (Eight) Hours As Needed for Nausea or Vomiting. 10 tablet 0   • pantoprazole (PROTONIX) 40 MG EC tablet      • polyethylene glycol-electrolytes (NULYTELY) 420 g solution 4,000 mL.     • raloxifene (Evista) 60 MG tablet Evista 60 mg tablet   Take 1 tablet every day by oral route.     • sulfamethoxazole-trimethoprim (Bactrim DS) 800-160 MG per tablet Take 1 tablet by mouth 2 (Two) Times a  "Day. 20 tablet 0   • valsartan (Diovan) 320 MG tablet Diovan 320 mg tablet   Take 1 tablet every day by oral route.       No current facility-administered medications for this visit.          OBJECTIVE    Pulse 76   Ht 152.4 cm (60\")   Wt 124 kg (273 lb)   SpO2 97%   BMI 53.32 kg/m²       Review of Systems   Constitutional: Negative.    HENT: Negative.    Eyes: Negative.    Respiratory: Negative.    Cardiovascular: Negative.    Gastrointestinal: Negative.    Endocrine: Negative.    Genitourinary: Negative.    Musculoskeletal: Positive for arthralgias, back pain and joint swelling.        Joint pain and foot pain    Skin: Positive for wound.   Allergic/Immunologic: Negative.    Neurological: Negative.    Hematological: Negative.    Psychiatric/Behavioral: Negative.          Physical Exam   Constitutional: she appears well-developed and well-nourished.   HEENT: Normocephalic. Atraumatic.  CV: No CP. RRR  Resp: Non-labored respirations.  Psychiatric: she has a normal mood and affect. her behavior is normal.         Lower Extremity Exam:  Vascular: DP/PT pulses palpable 2+.   Mild left foot edema  Foot warm  Neuro: Protective sensation intact, b/l.  DTRs intact  Negative Tinel over tarsal tunnel  Integument: Left subfirst MTPJ hyperkeratosis.  No fluctuance.  No drainage.  Moderate surrounding erythema to the first MTPJ.  No streaking cellulitis  No  Scaling, ecchymosis  No masses  Musculoskeletal: LE muscle strength 5/5.   Gait normal  Ankle ROM decreased without pain or crepitus  STJ ROM full without pain or crepitus  First MTPJ range of motion full without significant pain or crepitus  Mild tenderness palpation of medial first MTPJ on left.        ASSESSMENT AND PLAN    Diagnoses and all orders for this visit:    1. Cellulitis and abscess of foot (Primary)  -     XR Foot 3+ View Left    2. Left foot pain      -Comprehensive foot and ankle exam  -Discussed findings consistent with local cellulitis.    -Radiographs " ordered and reviewed.  No interval change indicative of deeper infection.  Complete Bactrim course  -Recheck 1 week        This document has been electronically signed by Gamaliel Starks DPM on December 22, 2020 13:16 CST     EMR Dragon/Transcription disclaimer:   Much of this encounter note is an electronic transcription/translation of spoken language to printed text. The electronic translation of spoken language may permit erroneous, or at times, nonsensical words or phrases to be inadvertently transcribed; Although I have reviewed the note for such errors, some may still exist.    Gamaliel Starks DPM  12/22/2020  13:16 CST

## 2021-01-04 ENCOUNTER — OFFICE VISIT (OUTPATIENT)
Dept: PODIATRY | Facility: CLINIC | Age: 56
End: 2021-01-04

## 2021-01-04 VITALS — BODY MASS INDEX: 53.6 KG/M2 | HEART RATE: 104 BPM | WEIGHT: 273 LBS | HEIGHT: 60 IN | OXYGEN SATURATION: 99 %

## 2021-01-04 DIAGNOSIS — L84 FOOT CALLUS: ICD-10-CM

## 2021-01-04 DIAGNOSIS — L02.619 CELLULITIS AND ABSCESS OF FOOT: Primary | ICD-10-CM

## 2021-01-04 DIAGNOSIS — L03.119 CELLULITIS AND ABSCESS OF FOOT: Primary | ICD-10-CM

## 2021-01-04 DIAGNOSIS — M79.672 LEFT FOOT PAIN: ICD-10-CM

## 2021-01-04 PROCEDURE — 99214 OFFICE O/P EST MOD 30 MIN: CPT | Performed by: PODIATRIST

## 2021-01-04 NOTE — PROGRESS NOTES
Haven Mahoney  1965  55 y.o. female     Patient presents to clinic today for a follow up on the left foot.     01/04/2021        Chief Complaint   Patient presents with   • Left Foot - Follow-up           History of Present Illness    Haven Mahoney is a 55 y.o. female who presents on follow-up of left foot cellulitis.  Has completed Levaquin and Bactrim previously.  Red streaks extending to her midfoot and lower leg have improved however she is noted consistent swelling and medial redness about the first MTPJ.  She does still note moderate tenderness with ambulation.    Past Medical History:   Diagnosis Date   • Anxiety    • Arthritis    • Arthropathy of lumbar facet joint    • Callus    • Chronic depression    • Degeneration of lumbar intervertebral disc    • Drug therapy     Other long term (current) drug therapy      • Epilepsy (CMS/HCC)    • Headache    • Hearing loss    • Heart problem    • High blood pressure    • Hypercholesterolemia    • Hypertensive disorder    • Obesity    • Osteoporosis    • Osteoporosis    • Varicose veins of lower extremity          Past Surgical History:   Procedure Laterality Date   • CARDIAC CATHETERIZATION  06/05/2013    Cardiac cath 89156 (1)      • INJECTION OF MEDICATION  03/28/2016    Injection for nerve block (2)      • OTHER SURGICAL HISTORY  05/05/2016    Incise spinal column/nerves (1)            Family History   Problem Relation Age of Onset   • Asthma Mother    • Cancer Mother         other   • Diabetes Mother    • Heart disease Mother    • Hyperlipidemia Mother    • Migraines Mother    • Osteoporosis Mother    • Cancer Father         other   • Hyperlipidemia Father    • Diabetes Maternal Grandmother    • Cancer Maternal Grandmother    • Heart disease Maternal Grandfather    • Mental illness Other         Mental Disorder   • Cancer Maternal Uncle          Social History     Socioeconomic History   • Marital status:      Spouse name: Not on file   •  Number of children: Not on file   • Years of education: Not on file   • Highest education level: Not on file   Tobacco Use   • Smoking status: Never Smoker   • Smokeless tobacco: Never Used   Substance and Sexual Activity   • Alcohol use: No   • Drug use: No   • Sexual activity: Defer         Current Outpatient Medications   Medication Sig Dispense Refill   • butalbital-acetaminophen-caffeine (FIORICET, ESGIC) -40 MG per tablet Take 1 tablet by mouth Every 6 (Six) Hours As Needed for Headache or Migraine. 15 tablet 0   • cholecalciferol (VITAMIN D3) 1000 UNITS tablet Take 1,000 Units by mouth Daily.     • clopidogrel (PLAVIX) 75 MG tablet      • desipramine (NORPRAMIN) 25 MG tablet desipramine 25 mg tablet   Take 1 tablet every day by oral route at bedtime.     • dicyclomine (BENTYL) 20 MG tablet Take 1 tablet by mouth Every 6 (Six) Hours As Needed (abdominal pain). 8 tablet 0   • fluconazole (DIFLUCAN) 150 MG tablet      • fluticasone (FLONASE) 50 MCG/ACT nasal spray 1 spray into the nostril(s) as directed by provider Daily.     • gabapentin (NEURONTIN) 300 MG capsule Take 300 mg by mouth Daily.     • Hyaluronan (OrthoVisc) 30 MG/2ML solution prefilled syringe injection 2 mL.     • HYDROcodone-acetaminophen (NORCO) 7.5-325 MG per tablet Take 1 tablet by mouth 5 (Five) Times a Day.     • HYDROcodone-acetaminophen (NORCO) 7.5-325 MG per tablet Take 1 tablet by mouth.     • levoFLOXacin (Levaquin) 500 MG tablet Take 1 tablet by mouth Daily. 10 tablet 0   • Loratadine (Claritin) 10 MG capsule Take 10 mg by mouth Daily.     • ondansetron ODT (ZOFRAN-ODT) 4 MG disintegrating tablet Place 1 tablet on the tongue Every 8 (Eight) Hours As Needed for Nausea or Vomiting. 10 tablet 0   • pantoprazole (PROTONIX) 40 MG EC tablet      • polyethylene glycol-electrolytes (NULYTELY) 420 g solution 4,000 mL.     • raloxifene (Evista) 60 MG tablet Evista 60 mg tablet   Take 1 tablet every day by oral route.     •  "sulfamethoxazole-trimethoprim (Bactrim DS) 800-160 MG per tablet Take 1 tablet by mouth 2 (Two) Times a Day. 20 tablet 0   • valsartan (Diovan) 320 MG tablet Diovan 320 mg tablet   Take 1 tablet every day by oral route.       No current facility-administered medications for this visit.          OBJECTIVE    Pulse 104   Ht 152.4 cm (60\")   Wt 124 kg (273 lb)   SpO2 99%   BMI 53.32 kg/m²       Review of Systems   Constitutional: Negative.    HENT: Negative.    Eyes: Negative.    Respiratory: Negative.    Cardiovascular: Negative.    Gastrointestinal: Negative.    Endocrine: Negative.    Genitourinary: Negative.    Musculoskeletal: Positive for arthralgias, back pain and joint swelling.        Joint pain and foot pain    Skin: Positive for wound.   Allergic/Immunologic: Negative.    Neurological: Negative.    Hematological: Negative.    Psychiatric/Behavioral: Negative.          Physical Exam   Constitutional: she appears well-developed and well-nourished.   HEENT: Normocephalic. Atraumatic.  CV: No CP. RRR  Resp: Non-labored respirations.  Psychiatric: she has a normal mood and affect. her behavior is normal.         Lower Extremity Exam:  Vascular: DP/PT pulses palpable 2+.   Mild left foot edema  Foot warm  Neuro: Protective sensation intact, b/l.  DTRs intact  Negative Tinel over tarsal tunnel  Integument: Left subfirst MTPJ hyperkeratosis.  No fluctuance.  No drainage.  Moderate surrounding erythema to the first MTPJ.  No streaking cellulitis  No scaling, ecchymosis  No masses  Musculoskeletal: LE muscle strength 5/5.   Gait normal  Ankle ROM decreased without pain or crepitus  STJ ROM full without pain or crepitus  First MTPJ range of motion full without significant pain or crepitus  Mild tenderness palpation of medial first MTPJ on left.        ASSESSMENT AND PLAN    Diagnoses and all orders for this visit:    1. Cellulitis and abscess of foot (Primary)  -     MRI Foot Left Without Contrast; Future    2. " Left foot pain    3. Foot callus      -Comprehensive foot and ankle exam  -Discussed findings consistent with local cellulitis.    -Given lack of total resolution following multiple prior antibiotic therapies I do recommend we further evaluate for deeper infection with MRI.  -Recheck following        This document has been electronically signed by Gamaliel Starks DPM on January 5, 2021 12:24 CST     EMR Dragon/Transcription disclaimer:   Much of this encounter note is an electronic transcription/translation of spoken language to printed text. The electronic translation of spoken language may permit erroneous, or at times, nonsensical words or phrases to be inadvertently transcribed; Although I have reviewed the note for such errors, some may still exist.    Gamaliel Starks DPM  1/5/2021  12:24 CST

## 2021-01-14 ENCOUNTER — HOSPITAL ENCOUNTER (OUTPATIENT)
Dept: MRI IMAGING | Facility: HOSPITAL | Age: 56
Discharge: HOME | End: 2021-01-14
Admitting: PODIATRIST

## 2021-01-14 DIAGNOSIS — L02.619 CELLULITIS AND ABSCESS OF FOOT: ICD-10-CM

## 2021-01-14 DIAGNOSIS — L03.119 CELLULITIS AND ABSCESS OF FOOT: ICD-10-CM

## 2021-01-14 PROCEDURE — 25010000002 GADOTERIDOL PER 1 ML: Performed by: PODIATRIST

## 2021-01-14 PROCEDURE — 73720 MRI LWR EXTREMITY W/O&W/DYE: CPT

## 2021-01-14 PROCEDURE — A9579 GAD-BASE MR CONTRAST NOS,1ML: HCPCS | Performed by: PODIATRIST

## 2021-01-14 RX ADMIN — GADOTERIDOL 20 ML: 279.3 INJECTION, SOLUTION INTRAVENOUS at 12:42

## 2021-01-21 ENCOUNTER — LAB (OUTPATIENT)
Dept: LAB | Facility: HOSPITAL | Age: 56
End: 2021-01-21

## 2021-01-21 ENCOUNTER — OFFICE VISIT (OUTPATIENT)
Dept: PODIATRY | Facility: CLINIC | Age: 56
End: 2021-01-21

## 2021-01-21 VITALS — HEART RATE: 93 BPM | OXYGEN SATURATION: 97 % | HEIGHT: 60 IN | BODY MASS INDEX: 53.6 KG/M2 | WEIGHT: 273 LBS

## 2021-01-21 DIAGNOSIS — M86.172 OTHER ACUTE OSTEOMYELITIS OF LEFT FOOT (HCC): ICD-10-CM

## 2021-01-21 DIAGNOSIS — L02.619 CELLULITIS AND ABSCESS OF FOOT: Primary | ICD-10-CM

## 2021-01-21 DIAGNOSIS — L03.119 CELLULITIS AND ABSCESS OF FOOT: Primary | ICD-10-CM

## 2021-01-21 PROCEDURE — 87147 CULTURE TYPE IMMUNOLOGIC: CPT | Performed by: PODIATRIST

## 2021-01-21 PROCEDURE — 87205 SMEAR GRAM STAIN: CPT | Performed by: PODIATRIST

## 2021-01-21 PROCEDURE — 99214 OFFICE O/P EST MOD 30 MIN: CPT | Performed by: PODIATRIST

## 2021-01-21 PROCEDURE — 87070 CULTURE OTHR SPECIMN AEROBIC: CPT | Performed by: PODIATRIST

## 2021-01-21 PROCEDURE — 87186 SC STD MICRODIL/AGAR DIL: CPT | Performed by: PODIATRIST

## 2021-01-21 RX ORDER — CLINDAMYCIN PHOSPHATE 900 MG/50ML
900 INJECTION INTRAVENOUS ONCE
Status: CANCELLED | OUTPATIENT
Start: 2021-01-29 | End: 2021-01-21

## 2021-01-21 RX ORDER — SULFAMETHOXAZOLE AND TRIMETHOPRIM 800; 160 MG/1; MG/1
1 TABLET ORAL 2 TIMES DAILY
Qty: 20 TABLET | Refills: 0 | Status: SHIPPED | OUTPATIENT
Start: 2021-01-21 | End: 2021-01-25

## 2021-01-21 NOTE — PROGRESS NOTES
Haven Mahoney  1965  55 y.o. female     Patient presents to clinic today for a follow up on the left foot.     01/21/2021      Chief Complaint   Patient presents with   • Left Foot - Follow-up           History of Present Illness    Haven Mahoney is a 55 y.o. female who presents on follow-up of left foot cellulitis.  Has completed Levaquin and Bactrim previously.  Red streaks extending to her midfoot and lower leg have improved however she has noted consistent swelling and medial redness about the first MTPJ.  She does still note moderate tenderness with ambulation.  Had MRI since last visit.    Past Medical History:   Diagnosis Date   • Anxiety    • Arthritis    • Arthropathy of lumbar facet joint    • Callus    • Chronic depression    • Degeneration of lumbar intervertebral disc    • Drug therapy     Other long term (current) drug therapy      • Epilepsy (CMS/HCC)    • Headache    • Hearing loss    • Heart problem    • High blood pressure    • Hypercholesterolemia    • Hypertensive disorder    • Obesity    • Osteoporosis    • Osteoporosis    • Varicose veins of lower extremity          Past Surgical History:   Procedure Laterality Date   • CARDIAC CATHETERIZATION  06/05/2013    Cardiac cath 39359 (1)      • INJECTION OF MEDICATION  03/28/2016    Injection for nerve block (2)      • OTHER SURGICAL HISTORY  05/05/2016    Incise spinal column/nerves (1)            Family History   Problem Relation Age of Onset   • Asthma Mother    • Cancer Mother         other   • Diabetes Mother    • Heart disease Mother    • Hyperlipidemia Mother    • Migraines Mother    • Osteoporosis Mother    • Cancer Father         other   • Hyperlipidemia Father    • Diabetes Maternal Grandmother    • Cancer Maternal Grandmother    • Heart disease Maternal Grandfather    • Mental illness Other         Mental Disorder   • Cancer Maternal Uncle          Social History     Socioeconomic History   • Marital status:      Spouse  name: Not on file   • Number of children: Not on file   • Years of education: Not on file   • Highest education level: Not on file   Tobacco Use   • Smoking status: Never Smoker   • Smokeless tobacco: Never Used   Substance and Sexual Activity   • Alcohol use: No   • Drug use: No   • Sexual activity: Defer         Current Outpatient Medications   Medication Sig Dispense Refill   • butalbital-acetaminophen-caffeine (FIORICET, ESGIC) -40 MG per tablet Take 1 tablet by mouth Every 6 (Six) Hours As Needed for Headache or Migraine. 15 tablet 0   • cholecalciferol (VITAMIN D3) 1000 UNITS tablet Take 1,000 Units by mouth Daily.     • clopidogrel (PLAVIX) 75 MG tablet      • desipramine (NORPRAMIN) 25 MG tablet desipramine 25 mg tablet   Take 1 tablet every day by oral route at bedtime.     • dicyclomine (BENTYL) 20 MG tablet Take 1 tablet by mouth Every 6 (Six) Hours As Needed (abdominal pain). 8 tablet 0   • fluconazole (DIFLUCAN) 150 MG tablet      • fluticasone (FLONASE) 50 MCG/ACT nasal spray 1 spray into the nostril(s) as directed by provider Daily.     • gabapentin (NEURONTIN) 300 MG capsule Take 300 mg by mouth Daily.     • Hyaluronan (OrthoVisc) 30 MG/2ML solution prefilled syringe injection 2 mL.     • HYDROcodone-acetaminophen (NORCO) 7.5-325 MG per tablet Take 1 tablet by mouth 5 (Five) Times a Day.     • HYDROcodone-acetaminophen (NORCO) 7.5-325 MG per tablet Take 1 tablet by mouth.     • levoFLOXacin (Levaquin) 500 MG tablet Take 1 tablet by mouth Daily. 10 tablet 0   • Loratadine (Claritin) 10 MG capsule Take 10 mg by mouth Daily.     • ondansetron ODT (ZOFRAN-ODT) 4 MG disintegrating tablet Place 1 tablet on the tongue Every 8 (Eight) Hours As Needed for Nausea or Vomiting. 10 tablet 0   • pantoprazole (PROTONIX) 40 MG EC tablet      • polyethylene glycol-electrolytes (NULYTELY) 420 g solution 4,000 mL.     • raloxifene (Evista) 60 MG tablet Evista 60 mg tablet   Take 1 tablet every day by oral route.   "   • sulfamethoxazole-trimethoprim (Bactrim DS) 800-160 MG per tablet Take 1 tablet by mouth 2 (Two) Times a Day. 20 tablet 0   • valsartan (Diovan) 320 MG tablet Diovan 320 mg tablet   Take 1 tablet every day by oral route.       No current facility-administered medications for this visit.          OBJECTIVE    Pulse 93   Ht 152.4 cm (60\")   Wt 124 kg (273 lb)   SpO2 97%   BMI 53.32 kg/m²       Review of Systems   Constitutional: Negative.    HENT: Negative.    Eyes: Negative.    Respiratory: Negative.    Cardiovascular: Negative.    Gastrointestinal: Negative.    Endocrine: Negative.    Genitourinary: Negative.    Musculoskeletal: Positive for arthralgias, back pain and joint swelling.        Joint pain and foot pain    Skin: Positive for wound.   Allergic/Immunologic: Negative.    Neurological: Negative.    Hematological: Negative.    Psychiatric/Behavioral: Negative.          Physical Exam   Constitutional: she appears well-developed and well-nourished.   HEENT: Normocephalic. Atraumatic.  CV: No CP. RRR  Resp: Non-labored respirations.  Psychiatric: she has a normal mood and affect. her behavior is normal.         Lower Extremity Exam:  Vascular: DP/PT pulses palpable 2+.   Mild left foot edema  Foot warm  Neuro: Protective sensation intact, b/l.  DTRs intact  Negative Tinel over tarsal tunnel  Integument: Left subfirst MTPJ hyperkeratosis.  No fluctuance.  No drainage.  Moderate surrounding erythema to the first MTPJ.  No streaking cellulitis  Newer onset ulceration to medial first MTPJ with scant seropurulent drainage noted.  No scaling, ecchymosis  No masses  Musculoskeletal: LE muscle strength 5/5.   Gait normal  Ankle ROM decreased without pain or crepitus  STJ ROM full without pain or crepitus  First MTPJ range of motion full without significant pain or crepitus  Mild tenderness palpation of medial first MTPJ on left.    ADDENDUM   ADDENDUM #1         ADDENDUM:     Findings osteomyelitis and soft " tissue infection/ulcer discussed  with Dr. Campos at 1/14/2021 4:59 PM CST, who is to relay this  information to ordering provider Gamaliel Starks.     Electronically signed by:  Denny Pena MD  1/14/2021  5:00 PM CST Workstation: [id number]      Addended by Denny Erwin on 1/14/2021  5:00 PM      Study Result    EXAM: MR FOOT WITHOUT THEN WITH IV CONTRAST, LEFT     COMPARISONS: None     INDICATION: Osteomyelitis, foot, L03.119 Cellulitis of  unspecified part of limb L02.619 Cutaneous abscess of unspecified  foot     TECHNIQUE: Multiplanar, multisequence MR images were obtained of  the left  foot before and after the uneventful administration of  gadolinium intravenous contrast.     FINDINGS:  There is circumferential enhancing soft tissue edema about the  left first metatarsophalangeal joint, predominantly medial and  plantar. Within the medial soft tissues at the level of the first  metatarsal head, there is a smaller peripherally enhancing fluid  collection that measures approximately 6 x 4 mm (axial T1  postcontrast images 27 and 28/1932). The medial sesamoid bone is  not well delineated within the soft tissue edema, possibly eroded  by overlying soft tissue infection. There does not appear to be  underlying/associated bone marrow edema of the first metatarsal  therefore first phalanx; however, metallic hardware artifact from  internal fixation screws somewhat limits evaluation of this  region.     There is a focus of bone marrow edema seen at the base of the  first metatarsal without appreciable enhancement or overlying  soft tissue abnormality.     There is mild dorsolateral superficial subcutaneous soft tissue  edema.     Talar dome is intact. Syndesmosis is of normal width.     There is subchondral sclerosis and cyst formation seen throughout  the tarsometatarsal joints.     Medial and lateral ankle tendons are unremarkable. Visualized  medial lateral ankle ligaments appear intact.  Achilles tendon is  intact.     There are prominent plantar and Achilles calcaneal enthesophytes.  There is mild asymmetric thickening of the medial plantar fascia.  No significant adjacent edema.     IMPRESSION:  Circumferential ulcer and cellulitis about the left first  metatarsophalangeal joint with suggestion of osteomyelitis within  the medial sesamoid bone. No appreciable extension to the  underlying first metatarsal head or first phalanx; although,  metallic artifact from internal fixation screws limits complete  evaluation of this region.     Stress injury injury of the left first metatarsal base.     Osteoarthritis of the midfoot.     Prominent calcaneal enthesophytes with chronic plantar fasciitis.     Urgent [facility] Connect to Referring Physician sent  1/14/2021 4:39 PM  CST     Electronically signed by:  Denny Pena MD  1/14/2021  4:40 PM CST Workstation: [id number]            ASSESSMENT AND PLAN    Diagnoses and all orders for this visit:    1. Cellulitis and abscess of foot (Primary)  -     Wound Culture - Wound, Foot; Future  -     Wound Culture - Wound, Foot  -     Case Request  -     clindamycin (CLEOCIN) 900 mg in dextrose (D5W) 5 % 100 mL IVPB    2. Other acute osteomyelitis of left foot (CMS/HCC)  -     Case Request  -     clindamycin (CLEOCIN) 900 mg in dextrose (D5W) 5 % 100 mL IVPB    Other orders  -     sulfamethoxazole-trimethoprim (Bactrim DS) 800-160 MG per tablet; Take 1 tablet by mouth 2 (Two) Times a Day.  Dispense: 20 tablet; Refill: 0  -     Follow Anesthesia Guidelines / Protocol; Future  -     Obtain Informed Consent; Future  -     Follow Anesthesia Guidelines / Protocol; Standing  -     Provide Instructions to Patient Regarding NPO Status; Future  -     Verify NPO Status; Standing  -     Obtain Informed Consent (If Not Done Inpatient or PAT); Standing      -Comprehensive foot and ankle exam  -MRI reviewed with patient.  Did reveal suspected osteomyelitis of the tibial  sesamoid.  At this point given persistent swelling erythema to the first metatarsophalangeal joint I did recommend incision and drainage with removal of tibial sesamoid.  Patient is in agreement.  All risk, benefits and potential complications discussed.  We will plan for 1/29/2021.  -Wound culture taken.  Will restart Bactrim for now.  -Recheck 1 week        This document has been electronically signed by Gamaliel Starks DPM on January 21, 2021 12:37 CST     EMR Dragon/Transcription disclaimer:   Much of this encounter note is an electronic transcription/translation of spoken language to printed text. The electronic translation of spoken language may permit erroneous, or at times, nonsensical words or phrases to be inadvertently transcribed; Although I have reviewed the note for such errors, some may still exist.    Gamaliel Starks DPM  1/21/2021  12:37 CST

## 2021-01-22 PROBLEM — L03.119 CELLULITIS AND ABSCESS OF FOOT: Status: ACTIVE | Noted: 2021-01-22

## 2021-01-22 PROBLEM — M86.9 PYOGENIC INFLAMMATION OF BONE (HCC): Status: ACTIVE | Noted: 2021-01-22

## 2021-01-22 PROBLEM — L02.619 CELLULITIS AND ABSCESS OF FOOT: Status: ACTIVE | Noted: 2021-01-22

## 2021-01-24 LAB
BACTERIA SPEC AEROBE CULT: ABNORMAL
GRAM STN SPEC: ABNORMAL
GRAM STN SPEC: ABNORMAL

## 2021-01-26 ENCOUNTER — LAB (OUTPATIENT)
Dept: LAB | Facility: HOSPITAL | Age: 56
End: 2021-01-26

## 2021-01-26 DIAGNOSIS — Z01.818 PREOP TESTING: Primary | ICD-10-CM

## 2021-01-26 LAB — SARS-COV-2 ORF1AB RESP QL NAA+PROBE: NOT DETECTED

## 2021-01-26 PROCEDURE — U0004 COV-19 TEST NON-CDC HGH THRU: HCPCS

## 2021-01-26 PROCEDURE — C9803 HOPD COVID-19 SPEC COLLECT: HCPCS

## 2021-01-28 ENCOUNTER — ANESTHESIA EVENT (OUTPATIENT)
Dept: PERIOP | Facility: HOSPITAL | Age: 56
End: 2021-01-28

## 2021-01-29 ENCOUNTER — ANESTHESIA (OUTPATIENT)
Dept: PERIOP | Facility: HOSPITAL | Age: 56
End: 2021-01-29

## 2021-01-29 ENCOUNTER — HOSPITAL ENCOUNTER (OUTPATIENT)
Facility: HOSPITAL | Age: 56
Setting detail: HOSPITAL OUTPATIENT SURGERY
Discharge: HOME | End: 2021-01-29
Attending: PODIATRIST | Admitting: PODIATRIST

## 2021-01-29 ENCOUNTER — APPOINTMENT (OUTPATIENT)
Dept: GENERAL RADIOLOGY | Facility: HOSPITAL | Age: 56
End: 2021-01-29

## 2021-01-29 ENCOUNTER — TELEPHONE (OUTPATIENT)
Dept: PODIATRY | Facility: CLINIC | Age: 56
End: 2021-01-29

## 2021-01-29 VITALS
BODY MASS INDEX: 53.58 KG/M2 | SYSTOLIC BLOOD PRESSURE: 120 MMHG | OXYGEN SATURATION: 96 % | TEMPERATURE: 97.1 F | DIASTOLIC BLOOD PRESSURE: 65 MMHG | WEIGHT: 272.93 LBS | HEART RATE: 77 BPM | RESPIRATION RATE: 18 BRPM | HEIGHT: 60 IN

## 2021-01-29 DIAGNOSIS — L03.119 CELLULITIS AND ABSCESS OF FOOT: ICD-10-CM

## 2021-01-29 DIAGNOSIS — L02.619 CELLULITIS AND ABSCESS OF FOOT: ICD-10-CM

## 2021-01-29 DIAGNOSIS — M86.172 OTHER ACUTE OSTEOMYELITIS OF LEFT FOOT (HCC): ICD-10-CM

## 2021-01-29 LAB
QT INTERVAL: 0 MS
QTC INTERVAL: 0 MS

## 2021-01-29 PROCEDURE — 25010000002 HYDROMORPHONE 1 MG/ML SOLUTION: Performed by: NURSE ANESTHETIST, CERTIFIED REGISTERED

## 2021-01-29 PROCEDURE — 28002 TREATMENT OF FOOT INFECTION: CPT | Performed by: PODIATRIST

## 2021-01-29 PROCEDURE — 20680 REMOVAL OF IMPLANT DEEP: CPT | Performed by: PODIATRIST

## 2021-01-29 PROCEDURE — 93010 ELECTROCARDIOGRAM REPORT: CPT | Performed by: INTERNAL MEDICINE

## 2021-01-29 PROCEDURE — 25010000002 MIDAZOLAM PER 1 MG: Performed by: NURSE ANESTHETIST, CERTIFIED REGISTERED

## 2021-01-29 PROCEDURE — 28315 REMOVAL OF SESAMOID BONE: CPT | Performed by: PODIATRIST

## 2021-01-29 PROCEDURE — 88311 DECALCIFY TISSUE: CPT

## 2021-01-29 PROCEDURE — 88300 SURGICAL PATH GROSS: CPT

## 2021-01-29 PROCEDURE — 93005 ELECTROCARDIOGRAM TRACING: CPT | Performed by: ANESTHESIOLOGY

## 2021-01-29 PROCEDURE — 25010000002 PROPOFOL 10 MG/ML EMULSION: Performed by: NURSE ANESTHETIST, CERTIFIED REGISTERED

## 2021-01-29 PROCEDURE — 25010000002 ONDANSETRON PER 1 MG: Performed by: NURSE ANESTHETIST, CERTIFIED REGISTERED

## 2021-01-29 PROCEDURE — 25010000002 FENTANYL CITRATE (PF) 100 MCG/2ML SOLUTION: Performed by: NURSE ANESTHETIST, CERTIFIED REGISTERED

## 2021-01-29 PROCEDURE — 88305 TISSUE EXAM BY PATHOLOGIST: CPT

## 2021-01-29 RX ORDER — FENTANYL CITRATE 50 UG/ML
INJECTION, SOLUTION INTRAMUSCULAR; INTRAVENOUS AS NEEDED
Status: DISCONTINUED | OUTPATIENT
Start: 2021-01-29 | End: 2021-01-29 | Stop reason: SURG

## 2021-01-29 RX ORDER — BUPIVACAINE HYDROCHLORIDE 5 MG/ML
INJECTION, SOLUTION EPIDURAL; INTRACAUDAL AS NEEDED
Status: DISCONTINUED | OUTPATIENT
Start: 2021-01-29 | End: 2021-01-29 | Stop reason: HOSPADM

## 2021-01-29 RX ORDER — MIDAZOLAM HYDROCHLORIDE 1 MG/ML
INJECTION INTRAMUSCULAR; INTRAVENOUS AS NEEDED
Status: DISCONTINUED | OUTPATIENT
Start: 2021-01-29 | End: 2021-01-29 | Stop reason: SURG

## 2021-01-29 RX ORDER — SULFAMETHOXAZOLE AND TRIMETHOPRIM 800; 160 MG/1; MG/1
1 TABLET ORAL 2 TIMES DAILY
Qty: 20 TABLET | Refills: 0 | Status: ON HOLD | OUTPATIENT
Start: 2021-01-29 | End: 2021-03-08 | Stop reason: SDUPTHER

## 2021-01-29 RX ORDER — SODIUM CHLORIDE, SODIUM GLUCONATE, SODIUM ACETATE, POTASSIUM CHLORIDE, AND MAGNESIUM CHLORIDE 526; 502; 368; 37; 30 MG/100ML; MG/100ML; MG/100ML; MG/100ML; MG/100ML
1000 INJECTION, SOLUTION INTRAVENOUS CONTINUOUS
Status: DISCONTINUED | OUTPATIENT
Start: 2021-01-29 | End: 2021-01-29 | Stop reason: HOSPADM

## 2021-01-29 RX ORDER — LIDOCAINE HYDROCHLORIDE 20 MG/ML
INJECTION, SOLUTION INFILTRATION; PERINEURAL AS NEEDED
Status: DISCONTINUED | OUTPATIENT
Start: 2021-01-29 | End: 2021-01-29 | Stop reason: SURG

## 2021-01-29 RX ORDER — CLINDAMYCIN PHOSPHATE 900 MG/50ML
900 INJECTION INTRAVENOUS ONCE
Status: COMPLETED | OUTPATIENT
Start: 2021-01-29 | End: 2021-01-29

## 2021-01-29 RX ORDER — MEPERIDINE HYDROCHLORIDE 25 MG/ML
12.5 INJECTION INTRAMUSCULAR; INTRAVENOUS; SUBCUTANEOUS
Status: DISCONTINUED | OUTPATIENT
Start: 2021-01-29 | End: 2021-01-29 | Stop reason: HOSPADM

## 2021-01-29 RX ORDER — PROPOFOL 10 MG/ML
VIAL (ML) INTRAVENOUS AS NEEDED
Status: DISCONTINUED | OUTPATIENT
Start: 2021-01-29 | End: 2021-01-29 | Stop reason: SURG

## 2021-01-29 RX ORDER — HYDROCODONE BITARTRATE AND ACETAMINOPHEN 10; 325 MG/1; MG/1
1 TABLET ORAL EVERY 6 HOURS PRN
Qty: 20 TABLET | Refills: 0 | Status: ON HOLD | OUTPATIENT
Start: 2021-01-29 | End: 2021-03-02

## 2021-01-29 RX ORDER — ONDANSETRON 2 MG/ML
INJECTION INTRAMUSCULAR; INTRAVENOUS AS NEEDED
Status: DISCONTINUED | OUTPATIENT
Start: 2021-01-29 | End: 2021-01-29 | Stop reason: SURG

## 2021-01-29 RX ADMIN — LIDOCAINE HYDROCHLORIDE 100 MG: 20 INJECTION, SOLUTION INFILTRATION; PERINEURAL at 13:42

## 2021-01-29 RX ADMIN — PROPOFOL 150 MG: 10 INJECTION, EMULSION INTRAVENOUS at 13:42

## 2021-01-29 RX ADMIN — MIDAZOLAM HYDROCHLORIDE 2 MG: 2 INJECTION, SOLUTION INTRAMUSCULAR; INTRAVENOUS at 13:31

## 2021-01-29 RX ADMIN — SODIUM CHLORIDE, SODIUM GLUCONATE, SODIUM ACETATE, POTASSIUM CHLORIDE, AND MAGNESIUM CHLORIDE 1000 ML: 526; 502; 368; 37; 30 INJECTION, SOLUTION INTRAVENOUS at 12:02

## 2021-01-29 RX ADMIN — ONDANSETRON 4 MG: 2 INJECTION INTRAMUSCULAR; INTRAVENOUS at 14:39

## 2021-01-29 RX ADMIN — FENTANYL CITRATE 25 MCG: 50 INJECTION, SOLUTION INTRAMUSCULAR; INTRAVENOUS at 13:42

## 2021-01-29 RX ADMIN — CLINDAMYCIN IN 5 PERCENT DEXTROSE 900 MG: 18 INJECTION, SOLUTION INTRAVENOUS at 13:42

## 2021-01-29 RX ADMIN — HYDROMORPHONE HYDROCHLORIDE 0.25 MG: 1 INJECTION, SOLUTION INTRAMUSCULAR; INTRAVENOUS; SUBCUTANEOUS at 15:18

## 2021-01-29 NOTE — ANESTHESIA PROCEDURE NOTES
Airway  Urgency: elective    Date/Time: 1/29/2021 1:43 PM  Airway not difficult    General Information and Staff    Patient location during procedure: OR  CRNA: Jaron Brian CRNA    Indications and Patient Condition  Indications for airway management: airway protection    Preoxygenated: yes  Mask difficulty assessment: 0 - not attempted    Final Airway Details  Final airway type: supraglottic airway      Successful airway: LMA  Size 4    Assessment: lips, teeth, and gum same as pre-op and atraumatic intubation

## 2021-01-29 NOTE — ANESTHESIA PREPROCEDURE EVALUATION
Anesthesia Evaluation     Patient summary reviewed and Nursing notes reviewed   NPO Solid Status: > 8 hours  NPO Liquid Status: > 8 hours           Airway   Mallampati: II  TM distance: >3 FB  Neck ROM: full  Possible difficult intubation and Large neck circumference  Dental - normal exam     Pulmonary - normal exam    breath sounds clear to auscultation  (+) a smoker Former,   (-) COPD, asthma, recent URI, sleep apnea, rhonchi, decreased breath sounds, wheezes  Cardiovascular   Exercise tolerance: poor (<4 METS)    ECG reviewed  Rhythm: regular  Rate: normal    (+) hypertension well controlled less than 2 medications, valvular problems/murmurs AS, hyperlipidemia,   (-) pacemaker, past MI, CAD, dysrhythmias, CHF, murmur, cardiac stents, CABG, DVT      Neuro/Psych  (+) seizures well controlled, psychiatric history Anxiety and Depression,     (-) TIA, CVA  GI/Hepatic/Renal/Endo    (+) obesity, morbid obesity, GERD well controlled,    (-) diabetes    Musculoskeletal     (+) back pain,   Abdominal   (+) obese,    Substance History - negative use     OB/GYN negative ob/gyn ROS         Other   arthritis,      ROS/Med Hx Other: BMI 53    WBC 13.28    GERD controlled on pepcid    TTE 5/29/2020:  ·The study is technically difficult for diagnosis  ·Estimated EF = 65%.  ·Left ventricular systolic function is normal.  ·Mild aortic valve stenosis is present.  ·Ao max PG 22.7 mmHg Ao mean PG 11 mmHg    Hx of seizures- last seizure was in 2012. Controlled off medications.      Phys Exam Other: BMI 53                Anesthesia Plan    ASA 3     general     intravenous induction     Anesthetic plan, all risks, benefits, and alternatives have been provided, discussed and informed consent has been obtained with: patient.    Plan discussed with CRNA.

## 2021-02-01 ENCOUNTER — OFFICE VISIT (OUTPATIENT)
Dept: PODIATRY | Facility: CLINIC | Age: 56
End: 2021-02-01

## 2021-02-01 VITALS — WEIGHT: 272 LBS | HEART RATE: 73 BPM | HEIGHT: 60 IN | OXYGEN SATURATION: 98 % | BODY MASS INDEX: 53.4 KG/M2

## 2021-02-01 DIAGNOSIS — L02.619 CELLULITIS AND ABSCESS OF FOOT: ICD-10-CM

## 2021-02-01 DIAGNOSIS — L03.119 CELLULITIS AND ABSCESS OF FOOT: ICD-10-CM

## 2021-02-01 DIAGNOSIS — M86.172 OTHER ACUTE OSTEOMYELITIS OF LEFT FOOT (HCC): Primary | ICD-10-CM

## 2021-02-01 PROCEDURE — 99024 POSTOP FOLLOW-UP VISIT: CPT | Performed by: PODIATRIST

## 2021-02-01 RX ORDER — SUCRALFATE 1 G/1
TABLET ORAL
COMMUNITY
Start: 2020-12-22 | End: 2022-09-07

## 2021-02-01 RX ORDER — HYDROCODONE BITARTRATE AND ACETAMINOPHEN 7.5; 325 MG/1; MG/1
1 TABLET ORAL
COMMUNITY
Start: 2021-01-13 | End: 2021-03-08 | Stop reason: HOSPADM

## 2021-02-01 RX ORDER — PANTOPRAZOLE SODIUM 40 MG/1
40 TABLET, DELAYED RELEASE ORAL
COMMUNITY
Start: 2020-12-22 | End: 2021-03-08 | Stop reason: HOSPADM

## 2021-02-01 NOTE — PROGRESS NOTES
Haven Mahoney  1965  55 y.o. female     Patient returns to clinic for post op appointment of left foot. Patient state her pain is 6/10  02/01/2021        Chief Complaint   Patient presents with   • Left Foot - Post-op           History of Present Illness    Haven Mahoney is a 55 y.o. female who presents on follow-up of left foot incision and drainage, hardware removal and tibial sesamoidectomy.  Date of surgery 1/29/2021.  She is doing well overall with expected postoperative pain.  Continues to take Bactrim without issue.    Past Medical History:   Diagnosis Date   • Anxiety    • Arthritis    • Arthropathy of lumbar facet joint    • Callus    • Chronic depression    • Degeneration of lumbar intervertebral disc    • Drug therapy     Other long term (current) drug therapy      • Epilepsy (CMS/HCC)    • Foot pain, left    • Headache    • Hearing loss    • Heart problem    • High blood pressure    • Hypercholesterolemia    • Hypertensive disorder    • Obesity    • Osteoporosis    • Osteoporosis    • Pancreatitis    • Seizures (CMS/HCC)    • Varicose veins of lower extremity    • Wears glasses          Past Surgical History:   Procedure Laterality Date   • APPENDECTOMY     • CARDIAC CATHETERIZATION  06/05/2013    Cardiac cath 74066 (1)      • CHOLECYSTECTOMY     • INJECTION OF MEDICATION  03/28/2016    Injection for nerve block (2)      • OTHER SURGICAL HISTORY  05/05/2016    Incise spinal column/nerves (1)            Family History   Problem Relation Age of Onset   • Asthma Mother    • Cancer Mother         other   • Diabetes Mother    • Heart disease Mother    • Hyperlipidemia Mother    • Migraines Mother    • Osteoporosis Mother    • Cancer Father         other   • Hyperlipidemia Father    • Diabetes Maternal Grandmother    • Cancer Maternal Grandmother    • Heart disease Maternal Grandfather    • Mental illness Other         Mental Disorder   • Cancer Maternal Uncle          Social History  "    Socioeconomic History   • Marital status:      Spouse name: Not on file   • Number of children: Not on file   • Years of education: Not on file   • Highest education level: Not on file   Tobacco Use   • Smoking status: Former Smoker   • Smokeless tobacco: Never Used   Substance and Sexual Activity   • Alcohol use: No   • Drug use: No   • Sexual activity: Defer         Current Outpatient Medications   Medication Sig Dispense Refill   • Famotidine (PEPCID AC PO) Take 20 mg by mouth Daily.     • fluticasone (FLONASE) 50 MCG/ACT nasal spray 1 spray into the nostril(s) as directed by provider As Needed.     • gabapentin (NEURONTIN) 300 MG capsule Take 300 mg by mouth Every Night. Tabs 2     • HYDROcodone-acetaminophen (NORCO)  MG per tablet Take 1 tablet by mouth Every 6 (Six) Hours As Needed for Moderate Pain . 20 tablet 0   • HYDROcodone-acetaminophen (NORCO) 7.5-325 MG per tablet Take 1 tablet by mouth.     • Loratadine (Claritin) 10 MG capsule Take 10 mg by mouth Daily.     • pantoprazole (PROTONIX) 40 MG EC tablet Take 40 mg by mouth.     • sucralfate (CARAFATE) 1 g tablet      • sulfamethoxazole-trimethoprim (Bactrim DS) 800-160 MG per tablet Take 1 tablet by mouth 2 (Two) Times a Day. 20 tablet 0   • valsartan (Diovan) 320 MG tablet Take 320 mg by mouth Daily.       No current facility-administered medications for this visit.          OBJECTIVE    Pulse 73   Ht 152.4 cm (60\")   Wt 123 kg (272 lb)   SpO2 98%   BMI 53.12 kg/m²       Review of Systems   Constitutional: Negative.    HENT: Negative.    Eyes: Negative.    Respiratory: Negative.    Cardiovascular: Negative.    Gastrointestinal: Negative.    Endocrine: Negative.    Genitourinary: Negative.    Musculoskeletal: Positive for arthralgias, back pain and joint swelling.        Joint pain and foot pain    Skin: Positive for wound.   Allergic/Immunologic: Negative.    Neurological: Negative.    Hematological: Negative.  "   Psychiatric/Behavioral: Negative.          Physical Exam   Constitutional: she appears well-developed and well-nourished.   HEENT: Normocephalic. Atraumatic.  CV: No CP. RRR  Resp: Non-labored respirations.  Psychiatric: she has a normal mood and affect. her behavior is normal.         Lower Extremity Exam:  Pulses palpable  Sensation intact  Mild left forefoot edema.  Mild medial arch ecchymosis.  Negative Angel.  Left foot incisions well opposed with sutures in place.  No signs of infection.  Can flex and extend all toes of left foot        ASSESSMENT AND PLAN    Diagnoses and all orders for this visit:    1. Other acute osteomyelitis of left foot (CMS/HCC) (Primary)    2. Cellulitis and abscess of foot      -Patient progressing well postoperatively.  -Dressing change today to be left clean dry intact x1 week.  -Complete Bactrim course  -Continue cam boot for all ambulation  -Recheck 1 week, possible suture        This document has been electronically signed by Gamaliel Starks DPM on February 1, 2021 12:50 CST     EMR Dragon/Transcription disclaimer:   Much of this encounter note is an electronic transcription/translation of spoken language to printed text. The electronic translation of spoken language may permit erroneous, or at times, nonsensical words or phrases to be inadvertently transcribed; Although I have reviewed the note for such errors, some may still exist.    Gamaliel Starks DPM  2/1/2021  12:50 CST

## 2021-02-05 LAB
LAB AP CASE REPORT: NORMAL
PATH REPORT.FINAL DX SPEC: NORMAL

## 2021-02-08 ENCOUNTER — OFFICE VISIT (OUTPATIENT)
Dept: PODIATRY | Facility: CLINIC | Age: 56
End: 2021-02-08

## 2021-02-08 VITALS — WEIGHT: 272 LBS | HEIGHT: 60 IN | HEART RATE: 86 BPM | OXYGEN SATURATION: 98 % | BODY MASS INDEX: 53.4 KG/M2

## 2021-02-08 DIAGNOSIS — L02.619 CELLULITIS AND ABSCESS OF FOOT: ICD-10-CM

## 2021-02-08 DIAGNOSIS — L03.119 CELLULITIS AND ABSCESS OF FOOT: ICD-10-CM

## 2021-02-08 DIAGNOSIS — M86.172 OTHER ACUTE OSTEOMYELITIS OF LEFT FOOT (HCC): Primary | ICD-10-CM

## 2021-02-08 PROCEDURE — 99024 POSTOP FOLLOW-UP VISIT: CPT | Performed by: PODIATRIST

## 2021-02-08 NOTE — PROGRESS NOTES
Haven Mahoney  1965  55 y.o. female     Patient returns to clinic for post op appointment of left foot. Patient is not having any pain.     02/08/2021      Chief Complaint   Patient presents with   • Left Foot - Post-op           History of Present Illness    Haven Mahoney is a 55 y.o. female who presents on follow-up of left foot incision and drainage, hardware removal and tibial sesamoidectomy.  Date of surgery 1/29/2021.  She is doing well overall with expected postoperative pain.  Continues to take Bactrim without issue.    Past Medical History:   Diagnosis Date   • Anxiety    • Arthritis    • Arthropathy of lumbar facet joint    • Callus    • Chronic depression    • Degeneration of lumbar intervertebral disc    • Drug therapy     Other long term (current) drug therapy      • Epilepsy (CMS/HCC)    • Foot pain, left    • Headache    • Hearing loss    • Heart problem    • High blood pressure    • Hypercholesterolemia    • Hypertensive disorder    • Obesity    • Osteoporosis    • Osteoporosis    • Pancreatitis    • Seizures (CMS/HCC)    • Varicose veins of lower extremity    • Wears glasses          Past Surgical History:   Procedure Laterality Date   • APPENDECTOMY     • CARDIAC CATHETERIZATION  06/05/2013    Cardiac cath [id number] (1)      • CHOLECYSTECTOMY     • INCISION AND DRAINAGE LEG Left 1/29/2021    Procedure: Left foot incision and drainage, tibial sesamoidectomy            (MIN C-ARM);  Surgeon: Gamaliel Starks DPM;  Location: [facility];  Service: Podiatry;  Laterality: Left;   • INJECTION OF MEDICATION  03/28/2016    Injection for nerve block (2)      • OTHER SURGICAL HISTORY  05/05/2016    Incise spinal column/nerves (1)            Family History   Problem Relation Age of Onset   • Asthma Mother    • Cancer Mother         other   • Diabetes Mother    • Heart disease Mother    • Hyperlipidemia Mother    • Migraines Mother    • Osteoporosis Mother    • Cancer Father         other   •  "Hyperlipidemia Father    • Diabetes Maternal Grandmother    • Cancer Maternal Grandmother    • Heart disease Maternal Grandfather    • Mental illness Other         Mental Disorder   • Cancer Maternal Uncle          Social History     Socioeconomic History   • Marital status:      Spouse name: Not on file   • Number of children: Not on file   • Years of education: Not on file   • Highest education level: Not on file   Tobacco Use   • Smoking status: Former Smoker   • Smokeless tobacco: Never Used   Substance and Sexual Activity   • Alcohol use: No   • Drug use: No   • Sexual activity: Defer         Current Outpatient Medications   Medication Sig Dispense Refill   • Famotidine (PEPCID AC PO) Take 20 mg by mouth Daily.     • fluticasone (FLONASE) 50 MCG/ACT nasal spray 1 spray into the nostril(s) as directed by provider As Needed.     • gabapentin (NEURONTIN) 300 MG capsule Take 300 mg by mouth Every Night. Tabs 2     • HYDROcodone-acetaminophen (NORCO)  MG per tablet Take 1 tablet by mouth Every 6 (Six) Hours As Needed for Moderate Pain . 20 tablet 0   • HYDROcodone-acetaminophen (NORCO) 7.5-325 MG per tablet Take 1 tablet by mouth.     • Loratadine (Claritin) 10 MG capsule Take 10 mg by mouth Daily.     • pantoprazole (PROTONIX) 40 MG EC tablet Take 40 mg by mouth.     • sucralfate (CARAFATE) 1 g tablet      • sulfamethoxazole-trimethoprim (Bactrim DS) 800-160 MG per tablet Take 1 tablet by mouth 2 (Two) Times a Day. 20 tablet 0   • valsartan (Diovan) 320 MG tablet Take 320 mg by mouth Daily.       No current facility-administered medications for this visit.          OBJECTIVE    Pulse 86   Ht 152.4 cm (60\")   Wt 123 kg (272 lb)   SpO2 98%   BMI 53.12 kg/m²       Review of Systems   Constitutional: Negative.    HENT: Negative.    Eyes: Negative.    Respiratory: Negative.    Cardiovascular: Negative.    Gastrointestinal: Negative.    Endocrine: Negative.    Genitourinary: Negative.    Musculoskeletal: " Positive for arthralgias, back pain and joint swelling.        Joint pain and foot pain    Skin: Positive for wound.   Allergic/Immunologic: Negative.    Neurological: Negative.    Hematological: Negative.    Psychiatric/Behavioral: Negative.          Physical Exam   Constitutional: she appears well-developed and well-nourished.   HEENT: Normocephalic. Atraumatic.  CV: No CP. RRR  Resp: Non-labored respirations.  Psychiatric: she has a normal mood and affect. her behavior is normal.         Lower Extremity Exam:  Pulses palpable  Sensation intact  Mild left forefoot edema, improving.  Negative Angel.  Left foot incisions well opposed with sutures in place.  No signs of infection.  Mild partial-thickness breakdown of proximal extent left medial first MTPJ incision  Can flex and extend all toes of left foot        ASSESSMENT AND PLAN    Diagnoses and all orders for this visit:    1. Other acute osteomyelitis of left foot (CMS/HCC) (Primary)    2. Cellulitis and abscess of foot      -Patient progressing well postoperatively.  -Dorsal sutures removed.  Medial sutures will remain in place for 1 additional week.  -Complete Bactrim course  -Continue cam boot for all ambulation  -Recheck 1 week        This document has been electronically signed by Gamaliel Starks DPM on February 9, 2021 07:54 CST     EMR Dragon/Transcription disclaimer:   Much of this encounter note is an electronic transcription/translation of spoken language to printed text. The electronic translation of spoken language may permit erroneous, or at times, nonsensical words or phrases to be inadvertently transcribed; Although I have reviewed the note for such errors, some may still exist.    Gamaliel Starks DPM  2/9/2021  07:54 CST

## 2021-02-15 ENCOUNTER — TELEPHONE (OUTPATIENT)
Dept: PODIATRY | Facility: CLINIC | Age: 56
End: 2021-02-15

## 2021-02-15 NOTE — TELEPHONE ENCOUNTER
No answer, left voicemail for her to call us back. May clean, pat dry, use antibiotic ointment and bandaid.

## 2021-02-15 NOTE — TELEPHONE ENCOUNTER
PT CALLED TO RESCHEDULE FOR NEXT WEEK DUE TO WEATHER. ASKED TO HAVE NURSE CALL HER REGARDING HER FOOT SHE HAD SURGERY ON. SHE SAID NOTHING IS WRONG BUT SINCE WILL BE A LITTLE LONGER FOR VISIT SHE WANTS TO KNOW IF THERE IS ANYTHING SHE SHOULD BE DOING TO IT SUCH AS CLEANING OR TYPE OF AFTER CARE ETC..... PLEASE ADVISE

## 2021-02-23 ENCOUNTER — OFFICE VISIT (OUTPATIENT)
Dept: PODIATRY | Facility: CLINIC | Age: 56
End: 2021-02-23

## 2021-02-23 VITALS — HEIGHT: 60 IN | BODY MASS INDEX: 53.4 KG/M2 | OXYGEN SATURATION: 98 % | WEIGHT: 272 LBS | HEART RATE: 83 BPM

## 2021-02-23 DIAGNOSIS — L03.119 CELLULITIS AND ABSCESS OF FOOT: ICD-10-CM

## 2021-02-23 DIAGNOSIS — M86.172 OTHER ACUTE OSTEOMYELITIS OF LEFT FOOT (HCC): Primary | ICD-10-CM

## 2021-02-23 DIAGNOSIS — L02.619 CELLULITIS AND ABSCESS OF FOOT: ICD-10-CM

## 2021-02-23 PROCEDURE — 99024 POSTOP FOLLOW-UP VISIT: CPT | Performed by: PODIATRIST

## 2021-02-23 NOTE — PROGRESS NOTES
Haven Mahoney  1965  55 y.o. female     Patient returns to clinic for post op appointment of left foot. Patient is not having any pain.     02/23/2021        Chief Complaint   Patient presents with   • Left Foot - Post-op Follow-up           History of Present Illness    Haven Mahoney is a 55 y.o. female who presents on follow-up of left foot incision and drainage, hardware removal and tibial sesamoidectomy.  Date of surgery 1/29/2021.  She is doing well overall with no pain today.  She has completed her antibiotic course.    Past Medical History:   Diagnosis Date   • Anxiety    • Arthritis    • Arthropathy of lumbar facet joint    • Callus    • Chronic depression    • Degeneration of lumbar intervertebral disc    • Drug therapy     Other long term (current) drug therapy      • Epilepsy (CMS/HCC)    • Foot pain, left    • Headache    • Hearing loss    • Heart problem    • High blood pressure    • Hypercholesterolemia    • Hypertensive disorder    • Obesity    • Osteoporosis    • Osteoporosis    • Pancreatitis    • Seizures (CMS/HCC)    • Varicose veins of lower extremity    • Wears glasses          Past Surgical History:   Procedure Laterality Date   • APPENDECTOMY     • CARDIAC CATHETERIZATION  06/05/2013    Cardiac cath 61603 (1)      • CHOLECYSTECTOMY     • INCISION AND DRAINAGE LEG Left 1/29/2021    Procedure: Left foot incision and drainage, tibial sesamoidectomy            (MIN C-ARM);  Surgeon: Gamaliel Starks DPM;  Location: [facility];  Service: Podiatry;  Laterality: Left;   • INJECTION OF MEDICATION  03/28/2016    Injection for nerve block (2)      • OTHER SURGICAL HISTORY  05/05/2016    Incise spinal column/nerves (1)            Family History   Problem Relation Age of Onset   • Asthma Mother    • Cancer Mother         other   • Diabetes Mother    • Heart disease Mother    • Hyperlipidemia Mother    • Migraines Mother    • Osteoporosis Mother    • Cancer Father         other   •  "Hyperlipidemia Father    • Diabetes Maternal Grandmother    • Cancer Maternal Grandmother    • Heart disease Maternal Grandfather    • Mental illness Other         Mental Disorder   • Cancer Maternal Uncle          Social History     Socioeconomic History   • Marital status:      Spouse name: Not on file   • Number of children: Not on file   • Years of education: Not on file   • Highest education level: Not on file   Tobacco Use   • Smoking status: Former Smoker   • Smokeless tobacco: Never Used   Substance and Sexual Activity   • Alcohol use: No   • Drug use: No   • Sexual activity: Defer         Current Outpatient Medications   Medication Sig Dispense Refill   • Famotidine (PEPCID AC PO) Take 20 mg by mouth Daily.     • fluticasone (FLONASE) 50 MCG/ACT nasal spray 1 spray into the nostril(s) as directed by provider As Needed.     • gabapentin (NEURONTIN) 300 MG capsule Take 300 mg by mouth Every Night. Tabs 2     • HYDROcodone-acetaminophen (NORCO)  MG per tablet Take 1 tablet by mouth Every 6 (Six) Hours As Needed for Moderate Pain . 20 tablet 0   • HYDROcodone-acetaminophen (NORCO) 7.5-325 MG per tablet Take 1 tablet by mouth.     • Loratadine (Claritin) 10 MG capsule Take 10 mg by mouth Daily.     • pantoprazole (PROTONIX) 40 MG EC tablet Take 40 mg by mouth.     • sucralfate (CARAFATE) 1 g tablet      • sulfamethoxazole-trimethoprim (Bactrim DS) 800-160 MG per tablet Take 1 tablet by mouth 2 (Two) Times a Day. 20 tablet 0   • valsartan (Diovan) 320 MG tablet Take 320 mg by mouth Daily.       No current facility-administered medications for this visit.          OBJECTIVE    Pulse 83   Ht 152.4 cm (60\")   Wt 123 kg (272 lb)   SpO2 98%   BMI 53.12 kg/m²       Review of Systems   Constitutional: Negative.    HENT: Negative.    Eyes: Negative.    Respiratory: Negative.    Cardiovascular: Negative.    Gastrointestinal: Negative.    Endocrine: Negative.    Genitourinary: Negative.    Musculoskeletal: " Positive for arthralgias, back pain and joint swelling.        Joint pain and foot pain    Skin: Positive for wound.   Allergic/Immunologic: Negative.    Neurological: Negative.    Hematological: Negative.    Psychiatric/Behavioral: Negative.          Physical Exam   Constitutional: she appears well-developed and well-nourished.   HEENT: Normocephalic. Atraumatic.  CV: No CP. RRR  Resp: Non-labored respirations.  Psychiatric: she has a normal mood and affect. her behavior is normal.         Lower Extremity Exam:  Pulses palpable  Sensation intact  Mild left forefoot edema, improving.  Negative Angel.  Left foot incisions predominantly healed with few sutures in place.  No signs of infection.  Mild partial-thickness breakdown of proximal extent left medial first MTPJ incision  Can flex and extend all toes of left foot        ASSESSMENT AND PLAN    Diagnoses and all orders for this visit:    1. Other acute osteomyelitis of left foot (CMS/HCC) (Primary)    2. Cellulitis and abscess of foot      -Patient progressing well postoperatively.  -Remaining sutures removed.  Continue local wound care of 0.3 cm partial-thickness incisional breakdown.  -Continue cam boot for all ambulation  -Recheck 1 week        This document has been electronically signed by Gamaliel Starks DPM on February 23, 2021 12:48 CST     EMR Dragon/Transcription disclaimer:   Much of this encounter note is an electronic transcription/translation of spoken language to printed text. The electronic translation of spoken language may permit erroneous, or at times, nonsensical words or phrases to be inadvertently transcribed; Although I have reviewed the note for such errors, some may still exist.    Gamaliel Starks DPM  2/23/2021  12:48 CST

## 2021-03-02 ENCOUNTER — APPOINTMENT (OUTPATIENT)
Dept: MRI IMAGING | Facility: HOSPITAL | Age: 56
End: 2021-03-02

## 2021-03-02 ENCOUNTER — HOSPITAL ENCOUNTER (INPATIENT)
Facility: HOSPITAL | Age: 56
LOS: 5 days | Discharge: HOME | End: 2021-03-08
Attending: FAMILY MEDICINE | Admitting: HOSPITALIST

## 2021-03-02 ENCOUNTER — OFFICE VISIT (OUTPATIENT)
Dept: PODIATRY | Facility: CLINIC | Age: 56
End: 2021-03-02

## 2021-03-02 VITALS — WEIGHT: 272 LBS | HEART RATE: 91 BPM | HEIGHT: 60 IN | BODY MASS INDEX: 53.4 KG/M2 | OXYGEN SATURATION: 99 %

## 2021-03-02 DIAGNOSIS — M86.172 OTHER ACUTE OSTEOMYELITIS OF LEFT FOOT (HCC): ICD-10-CM

## 2021-03-02 DIAGNOSIS — L03.119 CELLULITIS AND ABSCESS OF FOOT: Primary | ICD-10-CM

## 2021-03-02 DIAGNOSIS — L02.619 CELLULITIS AND ABSCESS OF FOOT: Primary | ICD-10-CM

## 2021-03-02 DIAGNOSIS — L03.119 CELLULITIS AND ABSCESS OF FOOT: ICD-10-CM

## 2021-03-02 DIAGNOSIS — M79.672 LEFT FOOT PAIN: ICD-10-CM

## 2021-03-02 DIAGNOSIS — M86.9 OSTEOMYELITIS OF GREAT TOE OF LEFT FOOT (HCC): Primary | ICD-10-CM

## 2021-03-02 DIAGNOSIS — L02.619 CELLULITIS AND ABSCESS OF FOOT: ICD-10-CM

## 2021-03-02 LAB
ALBUMIN SERPL-MCNC: 4 G/DL (ref 3.5–5.2)
ALBUMIN/GLOB SERPL: 1.2 G/DL
ALP SERPL-CCNC: 113 U/L (ref 39–117)
ALT SERPL W P-5'-P-CCNC: 10 U/L (ref 1–33)
ANION GAP SERPL CALCULATED.3IONS-SCNC: 7 MMOL/L (ref 5–15)
AST SERPL-CCNC: 17 U/L (ref 1–32)
BASOPHILS # BLD AUTO: 0.06 10*3/MM3 (ref 0–0.2)
BASOPHILS NFR BLD AUTO: 0.6 % (ref 0–1.5)
BILIRUB SERPL-MCNC: 0.3 MG/DL (ref 0–1.2)
BUN SERPL-MCNC: 9 MG/DL (ref 6–20)
BUN/CREAT SERPL: 13.6 (ref 7–25)
CALCIUM SPEC-SCNC: 9.5 MG/DL (ref 8.6–10.5)
CHLORIDE SERPL-SCNC: 101 MMOL/L (ref 98–107)
CO2 SERPL-SCNC: 27 MMOL/L (ref 22–29)
CREAT SERPL-MCNC: 0.66 MG/DL (ref 0.57–1)
DEPRECATED RDW RBC AUTO: 41.1 FL (ref 37–54)
EOSINOPHIL # BLD AUTO: 0.14 10*3/MM3 (ref 0–0.4)
EOSINOPHIL NFR BLD AUTO: 1.4 % (ref 0.3–6.2)
ERYTHROCYTE [DISTWIDTH] IN BLOOD BY AUTOMATED COUNT: 13.5 % (ref 12.3–15.4)
FLUAV RNA RESP QL NAA+PROBE: NOT DETECTED
FLUBV RNA RESP QL NAA+PROBE: NOT DETECTED
GFR SERPL CREATININE-BSD FRML MDRD: 93 ML/MIN/1.73
GLOBULIN UR ELPH-MCNC: 3.3 GM/DL
GLUCOSE SERPL-MCNC: 107 MG/DL (ref 65–99)
HCT VFR BLD AUTO: 39.1 % (ref 34–46.6)
HGB BLD-MCNC: 13 G/DL (ref 12–15.9)
HOLD SPECIMEN: NORMAL
IMM GRANULOCYTES # BLD AUTO: 0.08 10*3/MM3 (ref 0–0.05)
IMM GRANULOCYTES NFR BLD AUTO: 0.8 % (ref 0–0.5)
LYMPHOCYTES # BLD AUTO: 2.12 10*3/MM3 (ref 0.7–3.1)
LYMPHOCYTES NFR BLD AUTO: 21.6 % (ref 19.6–45.3)
MCH RBC QN AUTO: 27.8 PG (ref 26.6–33)
MCHC RBC AUTO-ENTMCNC: 33.2 G/DL (ref 31.5–35.7)
MCV RBC AUTO: 83.7 FL (ref 79–97)
MONOCYTES # BLD AUTO: 0.65 10*3/MM3 (ref 0.1–0.9)
MONOCYTES NFR BLD AUTO: 6.6 % (ref 5–12)
NEUTROPHILS NFR BLD AUTO: 6.75 10*3/MM3 (ref 1.7–7)
NEUTROPHILS NFR BLD AUTO: 69 % (ref 42.7–76)
NRBC BLD AUTO-RTO: 0 /100 WBC (ref 0–0.2)
PLATELET # BLD AUTO: 237 10*3/MM3 (ref 140–450)
PMV BLD AUTO: 9.2 FL (ref 6–12)
POTASSIUM SERPL-SCNC: 4.1 MMOL/L (ref 3.5–5.2)
PROT SERPL-MCNC: 7.3 G/DL (ref 6–8.5)
RBC # BLD AUTO: 4.67 10*6/MM3 (ref 3.77–5.28)
RBC MORPH BLD: NORMAL
SARS-COV-2 RNA RESP QL NAA+PROBE: NOT DETECTED
SMALL PLATELETS BLD QL SMEAR: ADEQUATE
SODIUM SERPL-SCNC: 135 MMOL/L (ref 136–145)
WBC # BLD AUTO: 9.8 10*3/MM3 (ref 3.4–10.8)
WBC MORPH BLD: NORMAL
WHOLE BLOOD HOLD SPECIMEN: NORMAL

## 2021-03-02 PROCEDURE — 80053 COMPREHEN METABOLIC PANEL: CPT | Performed by: FAMILY MEDICINE

## 2021-03-02 PROCEDURE — 25010000002 MORPHINE PER 10 MG: Performed by: HOSPITALIST

## 2021-03-02 PROCEDURE — G0378 HOSPITAL OBSERVATION PER HR: HCPCS

## 2021-03-02 PROCEDURE — 25010000002 ONDANSETRON PER 1 MG: Performed by: NURSE PRACTITIONER

## 2021-03-02 PROCEDURE — 87636 SARSCOV2 & INF A&B AMP PRB: CPT | Performed by: NURSE PRACTITIONER

## 2021-03-02 PROCEDURE — 73720 MRI LWR EXTREMITY W/O&W/DYE: CPT

## 2021-03-02 PROCEDURE — A9579 GAD-BASE MR CONTRAST NOS,1ML: HCPCS | Performed by: FAMILY MEDICINE

## 2021-03-02 PROCEDURE — 25010000002 HEPARIN (PORCINE) PER 1000 UNITS: Performed by: HOSPITALIST

## 2021-03-02 PROCEDURE — 25010000002 GADOTERIDOL PER 1 ML: Performed by: FAMILY MEDICINE

## 2021-03-02 PROCEDURE — 99214 OFFICE O/P EST MOD 30 MIN: CPT | Performed by: PODIATRIST

## 2021-03-02 PROCEDURE — 25010000002 VANCOMYCIN 5 G RECONSTITUTED SOLUTION: Performed by: HOSPITALIST

## 2021-03-02 PROCEDURE — 25010000002 VANCOMYCIN 5 G RECONSTITUTED SOLUTION: Performed by: NURSE PRACTITIONER

## 2021-03-02 PROCEDURE — 99284 EMERGENCY DEPT VISIT MOD MDM: CPT

## 2021-03-02 PROCEDURE — 87040 BLOOD CULTURE FOR BACTERIA: CPT | Performed by: NURSE PRACTITIONER

## 2021-03-02 PROCEDURE — 25010000002 MORPHINE PER 10 MG: Performed by: NURSE PRACTITIONER

## 2021-03-02 PROCEDURE — 85007 BL SMEAR W/DIFF WBC COUNT: CPT | Performed by: FAMILY MEDICINE

## 2021-03-02 PROCEDURE — 85025 COMPLETE CBC W/AUTO DIFF WBC: CPT | Performed by: FAMILY MEDICINE

## 2021-03-02 RX ORDER — ONDANSETRON 2 MG/ML
4 INJECTION INTRAMUSCULAR; INTRAVENOUS ONCE
Status: COMPLETED | OUTPATIENT
Start: 2021-03-02 | End: 2021-03-02

## 2021-03-02 RX ORDER — PANTOPRAZOLE SODIUM 40 MG/1
40 TABLET, DELAYED RELEASE ORAL
Status: DISCONTINUED | OUTPATIENT
Start: 2021-03-03 | End: 2021-03-08 | Stop reason: HOSPADM

## 2021-03-02 RX ORDER — MORPHINE SULFATE 2 MG/ML
2 INJECTION, SOLUTION INTRAMUSCULAR; INTRAVENOUS EVERY 4 HOURS PRN
Status: DISCONTINUED | OUTPATIENT
Start: 2021-03-02 | End: 2021-03-03

## 2021-03-02 RX ORDER — VALSARTAN 160 MG/1
320 TABLET ORAL DAILY
Status: DISCONTINUED | OUTPATIENT
Start: 2021-03-03 | End: 2021-03-08 | Stop reason: HOSPADM

## 2021-03-02 RX ORDER — ONDANSETRON 2 MG/ML
4 INJECTION INTRAMUSCULAR; INTRAVENOUS EVERY 6 HOURS PRN
Status: DISCONTINUED | OUTPATIENT
Start: 2021-03-02 | End: 2021-03-08 | Stop reason: HOSPADM

## 2021-03-02 RX ORDER — HYDRALAZINE HYDROCHLORIDE 20 MG/ML
10 INJECTION INTRAMUSCULAR; INTRAVENOUS EVERY 6 HOURS PRN
Status: DISCONTINUED | OUTPATIENT
Start: 2021-03-02 | End: 2021-03-08 | Stop reason: HOSPADM

## 2021-03-02 RX ORDER — SODIUM CHLORIDE 0.9 % (FLUSH) 0.9 %
10 SYRINGE (ML) INJECTION AS NEEDED
Status: DISCONTINUED | OUTPATIENT
Start: 2021-03-02 | End: 2021-03-08 | Stop reason: HOSPADM

## 2021-03-02 RX ORDER — ONDANSETRON 4 MG/1
4 TABLET, FILM COATED ORAL EVERY 6 HOURS PRN
Status: DISCONTINUED | OUTPATIENT
Start: 2021-03-02 | End: 2021-03-08 | Stop reason: HOSPADM

## 2021-03-02 RX ORDER — HEPARIN SODIUM 5000 [USP'U]/ML
5000 INJECTION, SOLUTION INTRAVENOUS; SUBCUTANEOUS EVERY 8 HOURS SCHEDULED
Status: DISCONTINUED | OUTPATIENT
Start: 2021-03-02 | End: 2021-03-08 | Stop reason: HOSPADM

## 2021-03-02 RX ORDER — SODIUM CHLORIDE 9 MG/ML
75 INJECTION, SOLUTION INTRAVENOUS CONTINUOUS
Status: DISCONTINUED | OUTPATIENT
Start: 2021-03-02 | End: 2021-03-08 | Stop reason: HOSPADM

## 2021-03-02 RX ORDER — NALOXONE HCL 0.4 MG/ML
0.4 VIAL (ML) INJECTION
Status: DISCONTINUED | OUTPATIENT
Start: 2021-03-02 | End: 2021-03-03

## 2021-03-02 RX ORDER — SODIUM CHLORIDE 0.9 % (FLUSH) 0.9 %
10 SYRINGE (ML) INJECTION EVERY 12 HOURS SCHEDULED
Status: DISCONTINUED | OUTPATIENT
Start: 2021-03-02 | End: 2021-03-08 | Stop reason: HOSPADM

## 2021-03-02 RX ADMIN — MORPHINE SULFATE 4 MG: 4 INJECTION INTRAVENOUS at 13:55

## 2021-03-02 RX ADMIN — GADOTERIDOL 20 ML: 279.3 INJECTION, SOLUTION INTRAVENOUS at 16:14

## 2021-03-02 RX ADMIN — SODIUM CHLORIDE, PRESERVATIVE FREE 10 ML: 5 INJECTION INTRAVENOUS at 20:31

## 2021-03-02 RX ADMIN — MORPHINE SULFATE 2 MG: 2 INJECTION, SOLUTION INTRAMUSCULAR; INTRAVENOUS at 20:30

## 2021-03-02 RX ADMIN — SODIUM CHLORIDE 75 ML/HR: 9 INJECTION, SOLUTION INTRAVENOUS at 20:30

## 2021-03-02 RX ADMIN — MORPHINE SULFATE 2 MG: 2 INJECTION, SOLUTION INTRAMUSCULAR; INTRAVENOUS at 16:37

## 2021-03-02 RX ADMIN — ONDANSETRON 4 MG: 2 INJECTION INTRAMUSCULAR; INTRAVENOUS at 13:55

## 2021-03-02 RX ADMIN — HEPARIN SODIUM 5000 UNITS: 5000 INJECTION INTRAVENOUS; SUBCUTANEOUS at 20:31

## 2021-03-02 RX ADMIN — VANCOMYCIN HYDROCHLORIDE 2500 MG: 5 INJECTION, POWDER, LYOPHILIZED, FOR SOLUTION INTRAVENOUS at 14:18

## 2021-03-02 RX ADMIN — VANCOMYCIN HYDROCHLORIDE 1250 MG: 5 INJECTION, POWDER, LYOPHILIZED, FOR SOLUTION INTRAVENOUS at 20:32

## 2021-03-02 NOTE — H&P
[facility] Medicine Admission      Date of Admission: 3/2/2021      Primary Care Physician: Asif Lin MD      Chief Complaint: Left foot cellulitis    HPI: Haven Walters is a 55-year-old  female who had left foot incision and drainage, hardware removal and tibial sesamoidectomy on 1/29/2021.  Presented further follow-up at the podiatry clinic where she endorsed worsening pain, discharge at the base of the left big toe over the weekend.  Patient has some chills, nausea.  Denies any fever, shortness of breath, abdominal pain or any other concerning symptoms.  Says she has some leftover clindamycin which did not help.  Weightbearing makes it worse.  Not sure what makes it better.  Patient was told to come to ER with concerns of possible osteomyelitis/abscess and IV antibiotics from the podiatry clinic.  Admitted for further evaluation.    Concurrent Medical History:  has a past medical history of Anxiety, Arthritis, Arthropathy of lumbar facet joint, Callus, Chronic depression, Degeneration of lumbar intervertebral disc, Drug therapy, Epilepsy (CMS/HCC), Foot pain, left, Headache, Hearing loss, Heart problem, High blood pressure, Hypercholesterolemia, Hypertensive disorder, Obesity, Osteoporosis, Osteoporosis, Pancreatitis, Seizures (CMS/HCC), Varicose veins of lower extremity, and Wears glasses.    Past Surgical History:  has a past surgical history that includes Cardiac catheterization (06/05/2013); Other surgical history (05/05/2016); Injection of Medication (03/28/2016); Appendectomy; Cholecystectomy; and incision and drainage leg (Left, 1/29/2021).    Family History: family history includes Asthma in her mother; Cancer in her father, maternal grandmother, maternal uncle, and mother; Diabetes in her maternal grandmother and mother; Heart disease in her maternal grandfather and mother; Hyperlipidemia in her father and mother; Mental illness in an other family  member; Migraines in her mother; Osteoporosis in her mother.     Social History:  reports that she has quit smoking. She has never used smokeless tobacco. She reports that she does not drink alcohol or use drugs.    Allergies:   Allergies   Allergen Reactions   • Amoxicillin-Pot Clavulanate Hives   • Clarithromycin Hives   • Ibuprofen Hives and Nausea And Vomiting   • Naproxen Hives and Nausea And Vomiting   • Nsaids Hives   • Other Other (See Comments)     Hx of MRSA       Medications:   Prior to Admission medications    Medication Sig Start Date End Date Taking? Authorizing Provider   Famotidine (PEPCID AC PO) Take 20 mg by mouth Daily.    Concepción Hancock MD   fluticasone (FLONASE) 50 MCG/ACT nasal spray 1 spray into the nostril(s) as directed by provider As Needed.    Concepción Hancock MD   gabapentin (NEURONTIN) 300 MG capsule Take 300 mg by mouth Every Night. Tabs 2 12/23/16   Concepción Hancock MD   HYDROcodone-acetaminophen (NORCO)  MG per tablet Take 1 tablet by mouth Every 6 (Six) Hours As Needed for Moderate Pain . 1/29/21   Gamaliel Starks DPM   HYDROcodone-acetaminophen (NORCO) 7.5-325 MG per tablet Take 1 tablet by mouth. 1/13/21   Concepción Hancock MD   Loratadine (Claritin) 10 MG capsule Take 10 mg by mouth Daily.    Concepción Hancock MD   pantoprazole (PROTONIX) 40 MG EC tablet Take 40 mg by mouth. 12/22/20 12/23/21  Concepción Hancock MD   sucralfate (CARAFATE) 1 g tablet  12/22/20   Concepción Hancock MD   sulfamethoxazole-trimethoprim (Bactrim DS) 800-160 MG per tablet Take 1 tablet by mouth 2 (Two) Times a Day. 1/29/21   Gamaliel Starks DPM   valsartan (Diovan) 320 MG tablet Take 320 mg by mouth Daily.    Concepción Hancock MD       Review of Systems:  Review of Systems   Constitutional: Negative for fever.   HENT: Negative for ear pain.    Eyes: Negative for pain.   Respiratory: Negative for shortness of breath and wheezing.    Cardiovascular: Negative  for chest pain.   Gastrointestinal: Negative for abdominal pain.   Genitourinary: Negative for dysuria.   Musculoskeletal: Negative for neck pain.   Skin: Positive for color change and wound. Negative for rash.   Neurological: Negative for headaches.   Psychiatric/Behavioral: Negative for agitation.      Otherwise complete ROS is negative except as mentioned above.    Physical Exam:   Temp:  [97.6 °F (36.4 °C)] 97.6 °F (36.4 °C)  Heart Rate:  [77-91] 80  Resp:  [18] 18  BP: (141-193)/(76-87) 166/77  Physical Exam  Constitutional:       Appearance: She is well-developed.   HENT:      Head: Normocephalic and atraumatic.   Eyes:      Pupils: Pupils are equal, round, and reactive to light.   Neck:      Musculoskeletal: Normal range of motion.   Cardiovascular:      Rate and Rhythm: Normal rate.   Pulmonary:      Breath sounds: Decreased breath sounds present. No wheezing.   Abdominal:      Palpations: Abdomen is soft.      Tenderness: There is no abdominal tenderness.   Skin:     General: Skin is warm and dry.      Comments: Left big toe-erythematous, tender, mild swelling   Neurological:      Mental Status: She is alert.           Results Reviewed:  I have personally reviewed current lab, radiology, and data and agree with results.  Lab Results (last 24 hours)     Procedure Component Value Units Date/Time    COVID-19 and FLU A/B PCR - Swab, Nasopharynx [[id number]]  (Normal) Collected: 03/02/21 1402    Specimen: Swab from Nasopharynx Updated: 03/02/21 1432     COVID19 Not Detected     Influenza A PCR Not Detected     Influenza B PCR Not Detected    Narrative:      Fact sheet for providers: https://www.fda.gov/media/975169/download    Fact sheet for patients: https://www.fda.gov/media/419064/download    Test performed by PCR.    Extra Tubes [[id number]] Collected: 03/02/21 1351    Specimen: Blood, Venous Line Updated: 03/02/21 1420    Narrative:      The following orders were created for panel order Extra  Tubes.  Procedure                               Abnormality         Status                     ---------                               -----------         ------                     Light Blue Top[[id number]]                                   In process                 Gold Top - SST[[id number]]                                   In process                   Please view results for these tests on the individual orders.    Light Blue Top [[id number]] Collected: 03/02/21 1420    Specimen: Blood Updated: 03/02/21 1420    Blood Culture - Blood, Arm, Right [[id number]] Collected: 03/02/21 1408    Specimen: Blood from Arm, Right Updated: 03/02/21 1408    Gold Top - SST [[id number]] Collected: 03/02/21 1351    Specimen: Blood Updated: 03/02/21 1351    CBC & Differential [[id number]]  (Abnormal) Collected: 03/02/21 1213    Specimen: Blood Updated: 03/02/21 1324    Narrative:      The following orders were created for panel order CBC & Differential.  Procedure                               Abnormality         Status                     ---------                               -----------         ------                     Scan Slide[[id number]]                                       Final result               CBC Auto Differential[[id number]]        Abnormal            Final result                 Please view results for these tests on the individual orders.    Scan Slide [[id number]] Collected: 03/02/21 1213    Specimen: Blood Updated: 03/02/21 1324     RBC Morphology Normal     WBC Morphology Normal     Platelet Estimate Adequate    Comprehensive Metabolic Panel [[id number]]  (Abnormal) Collected: 03/02/21 1213    Specimen: Blood Updated: 03/02/21 1243     Glucose 107 mg/dL      BUN 9 mg/dL      Creatinine 0.66 mg/dL      Sodium 135 mmol/L      Potassium 4.1 mmol/L      Chloride 101 mmol/L      CO2 27.0 mmol/L      Calcium 9.5 mg/dL      Total Protein 7.3 g/dL      Albumin 4.00 g/dL      ALT (SGPT) 10 U/L      AST (SGOT) 17 U/L       Alkaline Phosphatase 113 U/L      Total Bilirubin 0.3 mg/dL      eGFR Non African Amer 93 mL/min/1.73      Globulin 3.3 gm/dL      A/G Ratio 1.2 g/dL      BUN/Creatinine Ratio 13.6     Anion Gap 7.0 mmol/L     Narrative:      GFR Normal >60  Chronic Kidney Disease <60  Kidney Failure <15      CBC Auto Differential [[id number]]  (Abnormal) Collected: 03/02/21 1213    Specimen: Blood Updated: 03/02/21 1228     WBC 9.80 10*3/mm3      RBC 4.67 10*6/mm3      Hemoglobin 13.0 g/dL      Hematocrit 39.1 %      MCV 83.7 fL      MCH 27.8 pg      MCHC 33.2 g/dL      RDW 13.5 %      RDW-SD 41.1 fl      MPV 9.2 fL      Platelets 237 10*3/mm3      Neutrophil % 69.0 %      Lymphocyte % 21.6 %      Monocyte % 6.6 %      Eosinophil % 1.4 %      Basophil % 0.6 %      Immature Grans % 0.8 %      Neutrophils, Absolute 6.75 10*3/mm3      Lymphocytes, Absolute 2.12 10*3/mm3      Monocytes, Absolute 0.65 10*3/mm3      Eosinophils, Absolute 0.14 10*3/mm3      Basophils, Absolute 0.06 10*3/mm3      Immature Grans, Absolute 0.08 10*3/mm3      nRBC 0.0 /100 WBC         Imaging Results (Last 24 Hours)     ** No results found for the last 24 hours. **            Assessment:    Active Hospital Problems    Diagnosis   • Osteomyelitis of great toe of left foot (CMS/[facility])             Plan:  1.  Cellulitis/possible osteomyelitis of left great toe: New.  Start the patient on vancomycin for now.  Previous cultures grew MRSA.  MRI of left foot ordered.  Consult podiatry for recommendations.  Pain control.  Monitor  2.  Hypertension: Chronic.  Continue home and as needed medication.  Monitor    I discussed the patient's findings and my recommendations with: pt    Signed     Dr Milad Castro DO   3/2/2021  15:07 CST

## 2021-03-02 NOTE — ED PROVIDER NOTES
Subjective   Patient presents to the office from her podiatry's office for admission for left foot cellulitis and osteomyelitis. She states she was told she needed IV antibiotic and further imaging of her foot. Upon chart review, the providers note from this morning states patient was doing well and advised continued local wound care of her partial-thickness incisional breakdown, CAM boot for ambulation, and return in 1 week. Will attempt to contact Dr. Starks for clarification and further orders.           Review of Systems   Musculoskeletal:        Left foot pain and swelling       Past Medical History:   Diagnosis Date   • Anxiety    • Arthritis    • Arthropathy of lumbar facet joint    • Callus    • Chronic depression    • Degeneration of lumbar intervertebral disc    • Drug therapy     Other long term (current) drug therapy      • Epilepsy (CMS/HCC)    • Foot pain, left    • Headache    • Hearing loss    • Heart problem    • High blood pressure    • Hypercholesterolemia    • Hypertensive disorder    • Obesity    • Osteoporosis    • Osteoporosis    • Pancreatitis    • Seizures (CMS/HCC)    • Varicose veins of lower extremity    • Wears glasses        Allergies   Allergen Reactions   • Amoxicillin-Pot Clavulanate Hives   • Clarithromycin Hives   • Ibuprofen Hives and Nausea And Vomiting   • Naproxen Hives and Nausea And Vomiting   • Nsaids Hives   • Other Other (See Comments)     Hx of MRSA       Past Surgical History:   Procedure Laterality Date   • APPENDECTOMY     • CARDIAC CATHETERIZATION  06/05/2013    Cardiac cath [id number] (1)      • CHOLECYSTECTOMY     • INCISION AND DRAINAGE LEG Left 1/29/2021    Procedure: Left foot incision and drainage, tibial sesamoidectomy            (MIN C-ARM);  Surgeon: Gamaliel Starks DPM;  Location: [facility];  Service: Podiatry;  Laterality: Left;   • INJECTION OF MEDICATION  03/28/2016    Injection for nerve block (2)      • OTHER SURGICAL HISTORY  05/05/2016    Verena  "spinal column/nerves (1)          Family History   Problem Relation Age of Onset   • Asthma Mother    • Cancer Mother         other   • Diabetes Mother    • Heart disease Mother    • Hyperlipidemia Mother    • Migraines Mother    • Osteoporosis Mother    • Cancer Father         other   • Hyperlipidemia Father    • Diabetes Maternal Grandmother    • Cancer Maternal Grandmother    • Heart disease Maternal Grandfather    • Mental illness Other         Mental Disorder   • Cancer Maternal Uncle        Social History     Socioeconomic History   • Marital status:      Spouse name: Not on file   • Number of children: Not on file   • Years of education: Not on file   • Highest education level: Not on file   Tobacco Use   • Smoking status: Former Smoker   • Smokeless tobacco: Never Used   Substance and Sexual Activity   • Alcohol use: No   • Drug use: No   • Sexual activity: Defer           Objective    /74   Pulse 72   Temp 97.6 °F (36.4 °C) (Tympanic)   Resp 18   Ht 152.4 cm (60\")   Wt 124 kg (273 lb)   SpO2 96%   BMI 53.32 kg/m²     Physical Exam  Constitutional:       Appearance: She is obese. She is not ill-appearing.   Cardiovascular:      Rate and Rhythm: Normal rate and regular rhythm.      Pulses: Normal pulses.   Pulmonary:      Effort: Pulmonary effort is normal.      Breath sounds: Normal breath sounds.   Musculoskeletal: Normal range of motion.         General: Swelling and tenderness present.      Comments: Base of left great toe with redness and swelling, skin is moist under bandage, no active drainage at this time. Decreased ROM of great toe secondary to pain. Good cap refill.    Neurological:      Mental Status: She is alert and oriented to person, place, and time.   Psychiatric:         Mood and Affect: Mood normal.         Behavior: Behavior normal.         Thought Content: Thought content normal.         Judgment: Judgment normal.         Procedures  Results for orders placed or " performed during the hospital encounter of 03/02/21   COVID-19 and FLU A/B PCR - Swab, Nasopharynx    Specimen: Nasopharynx; Swab   Result Value Ref Range    COVID19 Not Detected Not Detected - Ref. Range    Influenza A PCR Not Detected Not Detected    Influenza B PCR Not Detected Not Detected   Comprehensive Metabolic Panel    Specimen: Blood   Result Value Ref Range    Glucose 107 (H) 65 - 99 mg/dL    BUN 9 6 - 20 mg/dL    Creatinine 0.66 0.57 - 1.00 mg/dL    Sodium 135 (L) 136 - 145 mmol/L    Potassium 4.1 3.5 - 5.2 mmol/L    Chloride 101 98 - 107 mmol/L    CO2 27.0 22.0 - 29.0 mmol/L    Calcium 9.5 8.6 - 10.5 mg/dL    Total Protein 7.3 6.0 - 8.5 g/dL    Albumin 4.00 3.50 - 5.20 g/dL    ALT (SGPT) 10 1 - 33 U/L    AST (SGOT) 17 1 - 32 U/L    Alkaline Phosphatase 113 39 - 117 U/L    Total Bilirubin 0.3 0.0 - 1.2 mg/dL    eGFR Non African Amer 93 >60 mL/min/1.73    Globulin 3.3 gm/dL    A/G Ratio 1.2 g/dL    BUN/Creatinine Ratio 13.6 7.0 - 25.0    Anion Gap 7.0 5.0 - 15.0 mmol/L   CBC Auto Differential    Specimen: Blood   Result Value Ref Range    WBC 9.80 3.40 - 10.80 10*3/mm3    RBC 4.67 3.77 - 5.28 10*6/mm3    Hemoglobin 13.0 12.0 - 15.9 g/dL    Hematocrit 39.1 34.0 - 46.6 %    MCV 83.7 79.0 - 97.0 fL    MCH 27.8 26.6 - 33.0 pg    MCHC 33.2 31.5 - 35.7 g/dL    RDW 13.5 12.3 - 15.4 %    RDW-SD 41.1 37.0 - 54.0 fl    MPV 9.2 6.0 - 12.0 fL    Platelets 237 140 - 450 10*3/mm3    Neutrophil % 69.0 42.7 - 76.0 %    Lymphocyte % 21.6 19.6 - 45.3 %    Monocyte % 6.6 5.0 - 12.0 %    Eosinophil % 1.4 0.3 - 6.2 %    Basophil % 0.6 0.0 - 1.5 %    Immature Grans % 0.8 (H) 0.0 - 0.5 %    Neutrophils, Absolute 6.75 1.70 - 7.00 10*3/mm3    Lymphocytes, Absolute 2.12 0.70 - 3.10 10*3/mm3    Monocytes, Absolute 0.65 0.10 - 0.90 10*3/mm3    Eosinophils, Absolute 0.14 0.00 - 0.40 10*3/mm3    Basophils, Absolute 0.06 0.00 - 0.20 10*3/mm3    Immature Grans, Absolute 0.08 (H) 0.00 - 0.05 10*3/mm3    nRBC 0.0 0.0 - 0.2 /100 WBC   Scan  Slide    Specimen: Blood   Result Value Ref Range    RBC Morphology Normal Normal    WBC Morphology Normal Normal    Platelet Estimate Adequate Normal   Light Blue Top   Result Value Ref Range    Extra Tube hold for add-on    Gold Top - SST   Result Value Ref Range    Extra Tube Hold for add-ons.      Xr Foot 3+ View Left    Result Date: 3/2/2021  Narrative: Three view left foot HISTORY: Left foot pain Weightbearing AP, lateral and oblique views obtained. COMPARISON: December 22, 2020 FINDINGS: Soft tissue swelling medial to the first metatarsal and base of the first proximal phalanx. Prior bunionectomy and osteotomy first metatarsal. Two screws have been removed from the first metatarsal. No bone destruction or periosteal reaction. Calcaneal spurs. No fracture or dislocation. No other osseous or articular abnormality.     Impression: CONCLUSION: Soft tissue swelling medial to the first metatarsal and base of the first proximal phalanx. Prior bunionectomy and osteotomy first metatarsal. Two screws have been removed from the first metatarsal. No bone destruction or periosteal reaction. Calcaneal spurs. [id number] Electronically signed by:  Law Gillespie MD  3/2/2021 5:45 PM [employer] Workstation: [employer]    Mri Foot Left With & Without Contrast    Result Date: 3/2/2021  Narrative: MRI left foot with and without contrast. HISTORY: Foot infection. Open wound medial distal foot. Prior exam: Left foot March 2, 2021. MRI left foot January 14, 2021. TECHNIQUE: Multiplanar multi sequence images of the left foot obtained both prior to and following the intravenous infusion of 20 mL ProHance. FINDINGS: Evidence of prior bunionectomy and osteotomy. Bone edema distal one half of the first metatarsal. Cortical irregularity and bony destruction plantar surface distal aspect first metatarsal. Soft tissue edema and fluid collections medial aspect of the foot towards the plantar surface first metatarsophalangeal joint. This most likely  represents abscess. In comparison to prior MRI, the soft tissue fluid collection is now larger and bone edema first metatarsal is more pronounced therefore indicating bone infection. Unfavorable change. Surgical screws through the first metatarsal seen on prior exam have been removed.     Impression: Evidence of prior bunionectomy and osteotomy first metatarsal. Interval progression of bone edema aspect  of the first metatarsal suggesting progression of disease, osteomyelitis. Cortical irregularity, bone destruction plantar surface distal first metatarsal. Unfavorable change. Soft tissue fluid collection first plantar surface of the first metatarsophalangeal joint probably abscess. This is larger than on prior exam. Unfavorable change. Surgical screws seen in the first metatarsal on prior MRI examination have been removed. MRI left foot is otherwise unremarkable. Electronically signed by:  Manohar Gaspar MD  3/2/2021 4:38 PM [facility] Workstation: [id number]             ED Course  ED Course as of Mar 02 1838   Tue Mar 02, 2021   1233 Dr. Raudel lugo.     [SH]   1307 Spoke with Dr. Starks's office. Office note has been updated with the recommendation of admission.     [SH]   1313 Spoke with Dr. Castro who agrees with admission.     [SH]      ED Course User Index  [SH] Brandy Rust APRN                                           [facility]    Final diagnoses:   Osteomyelitis of great toe of left foot (CMS/[facility])            Brandy Rust APRN  03/02/21 1838

## 2021-03-02 NOTE — PROGRESS NOTES
Haven Mahoney  1965  55 y.o. female     Patient returns to clinic for post op appointment of left foot. Patient is having 8/10 pain.    03/02/2021      Chief Complaint   Patient presents with   • Left Foot - Follow-up           History of Present Illness    Haven Mahoney is a 55 y.o. female who presents on follow-up of left foot incision and drainage, hardware removal and tibial sesamoidectomy.  Date of surgery 1/29/2021.  Had been doing quite well at last visit with just a small residual incisional ulceration however over the weekend she noted significant increase in swelling, redness and pain to the area.  She does note that she began taking some leftover clindamycin that she had at home on Saturday, about 8 doses in total and denies significant improvement of her symptoms.    Past Medical History:   Diagnosis Date   • Anxiety    • Arthritis    • Arthropathy of lumbar facet joint    • Callus    • Chronic depression    • Degeneration of lumbar intervertebral disc    • Drug therapy     Other long term (current) drug therapy      • Epilepsy (CMS/HCC)    • Foot pain, left    • Headache    • Hearing loss    • Heart problem    • High blood pressure    • Hypercholesterolemia    • Hypertensive disorder    • Obesity    • Osteoporosis    • Osteoporosis    • Pancreatitis    • Seizures (CMS/HCC)    • Varicose veins of lower extremity    • Wears glasses          Past Surgical History:   Procedure Laterality Date   • APPENDECTOMY     • CARDIAC CATHETERIZATION  06/05/2013    Cardiac cath 43233 (1)      • CHOLECYSTECTOMY     • INCISION AND DRAINAGE LEG Left 1/29/2021    Procedure: Left foot incision and drainage, tibial sesamoidectomy            (MIN C-ARM);  Surgeon: Gamaliel Starks DPM;  Location: [facility];  Service: Podiatry;  Laterality: Left;   • INJECTION OF MEDICATION  03/28/2016    Injection for nerve block (2)      • OTHER SURGICAL HISTORY  05/05/2016    Incise spinal column/nerves (1)            Family  "History   Problem Relation Age of Onset   • Asthma Mother    • Cancer Mother         other   • Diabetes Mother    • Heart disease Mother    • Hyperlipidemia Mother    • Migraines Mother    • Osteoporosis Mother    • Cancer Father         other   • Hyperlipidemia Father    • Diabetes Maternal Grandmother    • Cancer Maternal Grandmother    • Heart disease Maternal Grandfather    • Mental illness Other         Mental Disorder   • Cancer Maternal Uncle          Social History     Socioeconomic History   • Marital status:      Spouse name: Not on file   • Number of children: Not on file   • Years of education: Not on file   • Highest education level: Not on file   Tobacco Use   • Smoking status: Former Smoker   • Smokeless tobacco: Never Used   Substance and Sexual Activity   • Alcohol use: No   • Drug use: No   • Sexual activity: Defer         Current Outpatient Medications   Medication Sig Dispense Refill   • Famotidine (PEPCID AC PO) Take 20 mg by mouth Daily.     • fluticasone (FLONASE) 50 MCG/ACT nasal spray 1 spray into the nostril(s) as directed by provider As Needed.     • gabapentin (NEURONTIN) 300 MG capsule Take 300 mg by mouth Every Night. Tabs 2     • HYDROcodone-acetaminophen (NORCO)  MG per tablet Take 1 tablet by mouth Every 6 (Six) Hours As Needed for Moderate Pain . 20 tablet 0   • HYDROcodone-acetaminophen (NORCO) 7.5-325 MG per tablet Take 1 tablet by mouth.     • Loratadine (Claritin) 10 MG capsule Take 10 mg by mouth Daily.     • pantoprazole (PROTONIX) 40 MG EC tablet Take 40 mg by mouth.     • sucralfate (CARAFATE) 1 g tablet      • sulfamethoxazole-trimethoprim (Bactrim DS) 800-160 MG per tablet Take 1 tablet by mouth 2 (Two) Times a Day. 20 tablet 0   • valsartan (Diovan) 320 MG tablet Take 320 mg by mouth Daily.       No current facility-administered medications for this visit.          OBJECTIVE    Pulse 91   Ht 152.4 cm (60\")   Wt 123 kg (272 lb)   SpO2 99%   BMI 53.12 kg/m² "       Review of Systems   Constitutional: Negative.    HENT: Negative.    Eyes: Negative.    Respiratory: Negative.    Cardiovascular: Negative.    Gastrointestinal: Negative.    Endocrine: Negative.    Genitourinary: Negative.    Musculoskeletal: Positive for arthralgias, back pain and joint swelling.        Joint pain and foot pain    Skin: Positive for wound.   Allergic/Immunologic: Negative.    Neurological: Negative.    Hematological: Negative.    Psychiatric/Behavioral: Negative.          Physical Exam   Constitutional: she appears well-developed and well-nourished.   HEENT: Normocephalic. Atraumatic.  CV: No CP. RRR  Resp: Non-labored respirations.  Psychiatric: she has a normal mood and affect. her behavior is normal.         Lower Extremity Exam:  Pulses palpable  Sensation intact  Increased soft tissue edema about the first metatarsophalangeal joint on the left.  Marked increase in periincisional erythema.  No obvious active drainage or fluctuance noted.  Sharp tenderness palpation is noted.  Can flex and extend all toes of left foot        ASSESSMENT AND PLAN    Diagnoses and all orders for this visit:    1. Cellulitis and abscess of foot (Primary)    2. Left foot pain  -     XR Foot 3+ View Left    3. Other acute osteomyelitis of left foot (CMS/HCC)      -Patient with recurrent cellulitis to the left forefoot, cannot rule out underlying abscess at this time.  -Her intraoperative cultures from about a month ago were positive for MRSA, susceptible to clindamycin however patient noted no improvement of her symptoms with restarting clindamycin.  I did recommend admission to the hospital for further work-up with a repeat MRI and starting the patient on vancomycin.  Patient is in agreement.  Initially we were going to have her direct admit to the hospitalist however due to bed availability determined that it would be better for her to enter through the emergency department.  -We will recheck on the hospital  floor.        This document has been electronically signed by Gamaliel Starks DPM on March 2, 2021 12:47 CST     EMR Dragon/Transcription disclaimer:   Much of this encounter note is an electronic transcription/translation of spoken language to printed text. The electronic translation of spoken language may permit erroneous, or at times, nonsensical words or phrases to be inadvertently transcribed; Although I have reviewed the note for such errors, some may still exist.    Gamaliel Starks DPM  3/2/2021  12:47 CST

## 2021-03-02 NOTE — ED TRIAGE NOTES
Pt comes in today after being seen by Dr. Starks for an ongoing infection in her foot L foot. She started Cipro on Saturday.     She is ambulatory to room. aircast in place. Dressing to L ball of foot.

## 2021-03-02 NOTE — PROGRESS NOTES
"Pharmacokinetics by Pharmacy - Vancomycin    Haven Mahoney is a 55 y.o. female receiving vancomycin day 1 for SSTI    Objective:    Temp Readings from Last 1 Encounters:   03/02/21 97.6 °F (36.4 °C) (Tympanic)     WBC   Date Value Ref Range Status   03/02/2021 9.80 3.40 - 10.80 10*3/mm3 Final    No results found for: CRP, LACTATE   Creatinine   Date Value Ref Range Status   03/02/2021 0.66 0.57 - 1.00 mg/dL Final       No results found for: VANCOPEAK, VANCOTROUGH, VANCORANDOM    Culture Results:  Microbiology Results (last 10 days)     Procedure Component Value - Date/Time    COVID-19 and FLU A/B PCR - Swab, Nasopharynx [[id number]]  (Normal) Collected: 03/02/21 1402    Lab Status: Final result Specimen: Swab from Nasopharynx Updated: 03/02/21 1432     COVID19 Not Detected     Influenza A PCR Not Detected     Influenza B PCR Not Detected    Narrative:      Fact sheet for providers: https://www.fda.gov/media/692351/download    Fact sheet for patients: https://www.fda.gov/media/758861/download    Test performed by PCR.        No results found for: RESPCX    [Ht: 152.4 cm (60\"); Wt: 124 kg (273 lb)]   Body mass index is 53.32 kg/m².  Ideal body weight: 45.5 kg (100 lb 4.9 oz)  Adjusted ideal body weight: 76.8 kg (169 lb 6.2 oz)      Assessment:  • WBCs WNL  • Renal function appears normal with an Estimated Creatinine Clearance: 115.5 mL/min (by C-G formula based on SCr of 0.66 mg/dL).  • 3/2 blood cx pending. 1/21/21 foot culture grew MRSA.  • Patient received a vancomycin loading dose of 2500 mg.  • AUC and trough goals are 400-600 and 10-20 mcg/mL, respectively.     Plan:  1. Start Vancomycin 1250 mg IV q8hrs on 3/2 @ 2230, estimated AUC and trough of 430 and 12.6 mcg/mL, respectively.   2. Vancomycin levels to ordered when clinically indicated.   3. Pharmacy will monitor renal function and adjust dose accordingly.      Jez Rios, [facility]   03/02/21 15:37 CST    "

## 2021-03-03 ENCOUNTER — ANESTHESIA EVENT (OUTPATIENT)
Dept: PERIOP | Facility: HOSPITAL | Age: 56
End: 2021-03-03

## 2021-03-03 LAB
ANION GAP SERPL CALCULATED.3IONS-SCNC: 7 MMOL/L (ref 5–15)
BASOPHILS # BLD AUTO: 0.03 10*3/MM3 (ref 0–0.2)
BASOPHILS NFR BLD AUTO: 0.4 % (ref 0–1.5)
BUN SERPL-MCNC: 8 MG/DL (ref 6–20)
BUN/CREAT SERPL: 12.1 (ref 7–25)
CALCIUM SPEC-SCNC: 8.7 MG/DL (ref 8.6–10.5)
CHLORIDE SERPL-SCNC: 102 MMOL/L (ref 98–107)
CO2 SERPL-SCNC: 29 MMOL/L (ref 22–29)
CREAT SERPL-MCNC: 0.66 MG/DL (ref 0.57–1)
CRP SERPL-MCNC: 1.7 MG/DL (ref 0–0.5)
DEPRECATED RDW RBC AUTO: 43.7 FL (ref 37–54)
EOSINOPHIL # BLD AUTO: 0.11 10*3/MM3 (ref 0–0.4)
EOSINOPHIL NFR BLD AUTO: 1.5 % (ref 0.3–6.2)
ERYTHROCYTE [DISTWIDTH] IN BLOOD BY AUTOMATED COUNT: 13.7 % (ref 12.3–15.4)
ERYTHROCYTE [SEDIMENTATION RATE] IN BLOOD: 39 MM/HR (ref 0–20)
GFR SERPL CREATININE-BSD FRML MDRD: 93 ML/MIN/1.73
GLUCOSE SERPL-MCNC: 110 MG/DL (ref 65–99)
HCT VFR BLD AUTO: 38.5 % (ref 34–46.6)
HGB BLD-MCNC: 12.3 G/DL (ref 12–15.9)
IMM GRANULOCYTES # BLD AUTO: 0.05 10*3/MM3 (ref 0–0.05)
IMM GRANULOCYTES NFR BLD AUTO: 0.7 % (ref 0–0.5)
LYMPHOCYTES # BLD AUTO: 1.63 10*3/MM3 (ref 0.7–3.1)
LYMPHOCYTES NFR BLD AUTO: 21.8 % (ref 19.6–45.3)
MCH RBC QN AUTO: 27.9 PG (ref 26.6–33)
MCHC RBC AUTO-ENTMCNC: 31.9 G/DL (ref 31.5–35.7)
MCV RBC AUTO: 87.3 FL (ref 79–97)
MONOCYTES # BLD AUTO: 0.49 10*3/MM3 (ref 0.1–0.9)
MONOCYTES NFR BLD AUTO: 6.6 % (ref 5–12)
NEUTROPHILS NFR BLD AUTO: 5.15 10*3/MM3 (ref 1.7–7)
NEUTROPHILS NFR BLD AUTO: 69 % (ref 42.7–76)
NRBC BLD AUTO-RTO: 0 /100 WBC (ref 0–0.2)
PLATELET # BLD AUTO: 203 10*3/MM3 (ref 140–450)
PMV BLD AUTO: 9.2 FL (ref 6–12)
POTASSIUM SERPL-SCNC: 3.7 MMOL/L (ref 3.5–5.2)
RBC # BLD AUTO: 4.41 10*6/MM3 (ref 3.77–5.28)
SODIUM SERPL-SCNC: 138 MMOL/L (ref 136–145)
WBC # BLD AUTO: 7.46 10*3/MM3 (ref 3.4–10.8)

## 2021-03-03 PROCEDURE — 36415 COLL VENOUS BLD VENIPUNCTURE: CPT | Performed by: HOSPITALIST

## 2021-03-03 PROCEDURE — 86140 C-REACTIVE PROTEIN: CPT | Performed by: PODIATRIST

## 2021-03-03 PROCEDURE — 25010000002 HEPARIN (PORCINE) PER 1000 UNITS: Performed by: HOSPITALIST

## 2021-03-03 PROCEDURE — 80048 BASIC METABOLIC PNL TOTAL CA: CPT | Performed by: HOSPITALIST

## 2021-03-03 PROCEDURE — 87040 BLOOD CULTURE FOR BACTERIA: CPT | Performed by: HOSPITALIST

## 2021-03-03 PROCEDURE — 25010000002 MORPHINE PER 10 MG: Performed by: HOSPITALIST

## 2021-03-03 PROCEDURE — 25010000002 VANCOMYCIN 5 G RECONSTITUTED SOLUTION: Performed by: HOSPITALIST

## 2021-03-03 PROCEDURE — 99221 1ST HOSP IP/OBS SF/LOW 40: CPT | Performed by: PODIATRIST

## 2021-03-03 PROCEDURE — 85651 RBC SED RATE NONAUTOMATED: CPT | Performed by: PODIATRIST

## 2021-03-03 PROCEDURE — 85025 COMPLETE CBC W/AUTO DIFF WBC: CPT | Performed by: HOSPITALIST

## 2021-03-03 RX ORDER — NALOXONE HCL 0.4 MG/ML
0.4 VIAL (ML) INJECTION
Status: DISCONTINUED | OUTPATIENT
Start: 2021-03-03 | End: 2021-03-05

## 2021-03-03 RX ADMIN — VANCOMYCIN HYDROCHLORIDE 1250 MG: 5 INJECTION, POWDER, LYOPHILIZED, FOR SOLUTION INTRAVENOUS at 05:56

## 2021-03-03 RX ADMIN — VANCOMYCIN HYDROCHLORIDE 1250 MG: 5 INJECTION, POWDER, LYOPHILIZED, FOR SOLUTION INTRAVENOUS at 21:26

## 2021-03-03 RX ADMIN — MORPHINE SULFATE 4 MG: 4 INJECTION INTRAVENOUS at 17:46

## 2021-03-03 RX ADMIN — VANCOMYCIN HYDROCHLORIDE 1250 MG: 5 INJECTION, POWDER, LYOPHILIZED, FOR SOLUTION INTRAVENOUS at 16:20

## 2021-03-03 RX ADMIN — HEPARIN SODIUM 5000 UNITS: 5000 INJECTION INTRAVENOUS; SUBCUTANEOUS at 20:53

## 2021-03-03 RX ADMIN — PANTOPRAZOLE SODIUM 40 MG: 40 TABLET, DELAYED RELEASE ORAL at 05:05

## 2021-03-03 RX ADMIN — HEPARIN SODIUM 5000 UNITS: 5000 INJECTION INTRAVENOUS; SUBCUTANEOUS at 05:05

## 2021-03-03 RX ADMIN — MORPHINE SULFATE 2 MG: 2 INJECTION, SOLUTION INTRAMUSCULAR; INTRAVENOUS at 05:05

## 2021-03-03 RX ADMIN — MORPHINE SULFATE 2 MG: 2 INJECTION, SOLUTION INTRAMUSCULAR; INTRAVENOUS at 01:15

## 2021-03-03 RX ADMIN — MORPHINE SULFATE 2 MG: 2 INJECTION, SOLUTION INTRAMUSCULAR; INTRAVENOUS at 09:19

## 2021-03-03 RX ADMIN — VALSARTAN 320 MG: 160 TABLET, FILM COATED ORAL at 08:41

## 2021-03-03 RX ADMIN — MORPHINE SULFATE 4 MG: 4 INJECTION INTRAVENOUS at 22:38

## 2021-03-03 RX ADMIN — HEPARIN SODIUM 5000 UNITS: 5000 INJECTION INTRAVENOUS; SUBCUTANEOUS at 16:20

## 2021-03-03 RX ADMIN — SODIUM CHLORIDE, PRESERVATIVE FREE 10 ML: 5 INJECTION INTRAVENOUS at 20:54

## 2021-03-03 RX ADMIN — SODIUM CHLORIDE 75 ML/HR: 9 INJECTION, SOLUTION INTRAVENOUS at 12:50

## 2021-03-03 RX ADMIN — MORPHINE SULFATE 4 MG: 4 INJECTION INTRAVENOUS at 12:51

## 2021-03-03 NOTE — PROGRESS NOTES
[facility] Medicine Services  INPATIENT PROGRESS NOTE    Length of Stay: 0  Date of Admission: 3/2/2021  Primary Care Physician: Asif Lin MD    Subjective   Chief Complaint: left foot cellulitis  HPI:  56 yo CF admitted for worsening symptoms at the base of the left great toe    Cont to have pain in the left foot.  Says pain meds does not long.  Denies any other new complaints    Review of Systems   Respiratory: Negative for shortness of breath.    Cardiovascular: Negative for chest pain.   Gastrointestinal: Negative for abdominal pain.   Musculoskeletal: Positive for arthralgias.        All pertinent negatives and positives are as above. All other systems have been reviewed and are negative unless otherwise stated.     Objective    Temp:  [96.5 °F (35.8 °C)-97.4 °F (36.3 °C)] 97.4 °F (36.3 °C)  Heart Rate:  [71-86] 71  Resp:  [18] 18  BP: (120-164)/(69-78) 162/77    Physical Exam  Constitutional:       Appearance: She is well-developed.   HENT:      Head: Normocephalic and atraumatic.   Eyes:      Pupils: Pupils are equal, round, and reactive to light.   Neck:      Musculoskeletal: Normal range of motion.   Cardiovascular:      Rate and Rhythm: Normal rate.   Pulmonary:      Breath sounds: Decreased breath sounds present. No wheezing.   Abdominal:      Palpations: Abdomen is soft.      Tenderness: There is no abdominal tenderness.   Skin:     General: Skin is warm and dry.      Findings: Erythema present.      Comments: Swelling, erythema, tenderness, small wound at the firs metatarsal   Neurological:      Mental Status: She is alert.             Results Review:  I have reviewed the labs, radiology results, and diagnostic studies.    Laboratory Data:   Results from last 7 days   Lab Units 03/03/21  0558 03/02/21  1213   SODIUM mmol/L 138 135*   POTASSIUM mmol/L 3.7 4.1   CHLORIDE mmol/L 102 101   CO2 mmol/L 29.0 27.0   BUN mg/dL 8 9   CREATININE mg/dL 0.66 0.66    GLUCOSE mg/dL 110* 107*   CALCIUM mg/dL 8.7 9.5   BILIRUBIN mg/dL  --  0.3   ALK PHOS U/L  --  113   ALT (SGPT) U/L  --  10   AST (SGOT) U/L  --  17   ANION GAP mmol/L 7.0 7.0     Estimated Creatinine Clearance: 114.9 mL/min (by C-G formula based on SCr of 0.66 mg/dL).          Results from last 7 days   Lab Units 03/03/21  0558 03/02/21  1213   WBC 10*3/mm3 7.46 9.80   HEMOGLOBIN g/dL 12.3 13.0   HEMATOCRIT % 38.5 39.1   PLATELETS 10*3/mm3 203 237           Culture Data:   Blood Culture   Date Value Ref Range Status   03/02/2021 No growth at 24 hours  Preliminary     No results found for: URINECX  No results found for: RESPCX  No results found for: WOUNDCX  No results found for: STOOLCX  No components found for: BODYFLD    Radiology Data:   Imaging Results (Last 24 Hours)     Procedure Component Value Units Date/Time    MRI Foot Left With & Without Contrast [[id number]] Collected: 03/02/21 1528     Updated: 03/02/21 1639    Narrative:      MRI left foot with and without contrast.    HISTORY: Foot infection. Open wound medial distal foot.    Prior exam: Left foot March 2, 2021. MRI left foot January 14, 2021.    TECHNIQUE: Multiplanar multi sequence images of the left foot  obtained both prior to and following the intravenous infusion of  20 mL ProHance.    FINDINGS: Evidence of prior bunionectomy and osteotomy.    Bone edema distal one half of the first metatarsal. Cortical  irregularity and bony destruction plantar surface distal aspect  first metatarsal. Soft tissue edema and fluid collections medial  aspect of the foot towards the plantar surface first  metatarsophalangeal joint. This most likely represents abscess.    In comparison to prior MRI, the soft tissue fluid collection is  now larger and bone edema first metatarsal is more pronounced  therefore indicating bone infection. Unfavorable change.    Surgical screws through the first metatarsal seen on prior exam  have been removed.      Impression:       Evidence of prior bunionectomy and osteotomy first  metatarsal.    Interval progression of bone edema aspect  of the first  metatarsal suggesting progression of disease, osteomyelitis.  Cortical irregularity, bone destruction plantar surface distal  first metatarsal. Unfavorable change. Soft tissue fluid  collection first plantar surface of the first metatarsophalangeal  joint probably abscess. This is larger than on prior exam.  Unfavorable change.    Surgical screws seen in the first metatarsal on prior MRI  examination have been removed.    MRI left foot is otherwise unremarkable.    Electronically signed by:  Manohar Gaspar MD  3/2/2021 4:38 PM CST  Workstation: [id number]          I have reviewed the patient's current medications.     Assessment/Plan     Active Hospital Problems    Diagnosis   • Osteomyelitis of great toe of left foot (CMS/[facility])       Plan:    1.  Cellulitis/possible osteomyelitis of left great toe: worse.  Podiatry for surgery in am.  Previous cultures grew MRSA.  MRI of left foot with possible abscess, osteomyelitis.   Pain control.  Monitor  2.  Hypertension: borderline.  Cont current treatment.  Monitor         Discharge Planning: I expect patient to be discharged to home in 3-4 days.    Signed     Dr Milad Castro DO   3/3/2021  15:17 CST

## 2021-03-03 NOTE — PLAN OF CARE
Goal Outcome Evaluation:        Outcome Summary: Pt alert and verbal, needs med. C/O pain with pain med increased d/t increased pain level. Pt states pain is relieved with current dose. Will continue to monitor.

## 2021-03-03 NOTE — PLAN OF CARE
Problem: Adult Inpatient Plan of Care  Goal: Plan of Care Review  Outcome: Ongoing, Progressing  Flowsheets (Taken 3/3/2021 0104)  Progress: no change  Plan of Care Reviewed With: patient  Outcome Summary: new admission, vss, pain controlled with prn meds, room air, podiatry consult in, antibiotics started, pt resting well, will cont to monitor

## 2021-03-03 NOTE — H&P (VIEW-ONLY)
[facility]   Consult Note    Patient Name: Haven Mahoney  : 1965  MRN: [id number]  Primary Care Physician: Asif Lin MD  Referring Physician: Sampson Davis MD  Date of admission: 3/2/2021    Inpatient Podiatry Consult  Consult performed by: Gamaliel Starks DPM  Consult ordered by: Milad Castro DO  Reason for consult: Left foot infection        Subjective   Subjective     Reason for Consult/ Chief Complaint: Left foot pain, infection    Haven Mahoney female 55 y.o. admitted for worsening left forefoot infection, failed outpatient antibiotics.  Previously underwent incision and drainage with tibial sesamoidectomy and hardware removal with me on 2021.  Has been progressing well however noticed increased redness pain and drainage over the past week.  This failed to improve on clindamycin as outpatient.  Wound Infection  This is a recurrent problem. The current episode started in the past 7 days. The problem occurs constantly. The problem has been unchanged. Associated symptoms include arthralgias. The symptoms are aggravated by bending and standing. Treatments tried: Antibiotics. The treatment provided no relief.       Review of Systems   Constitutional: Negative.    Respiratory: Negative.    Cardiovascular: Positive for leg swelling.   Musculoskeletal: Positive for arthralgias.   Skin: Positive for color change and wound.   Neurological: Negative.    Psychiatric/Behavioral: Negative.         Personal History     Past Medical History:   Diagnosis Date   • Anxiety    • Arthritis    • Arthropathy of lumbar facet joint    • Callus    • Chronic depression    • Degeneration of lumbar intervertebral disc    • Drug therapy     Other long term (current) drug therapy      • Epilepsy (CMS/HCC)    • Foot pain, left    • Headache    • Hearing loss    • Heart problem    • High blood pressure    • Hypercholesterolemia    • Hypertensive disorder    • Obesity    • Osteoporosis    •  Osteoporosis    • Pancreatitis    • Seizures (CMS/HCC)    • Varicose veins of lower extremity    • Wears glasses        Past Surgical History:   Procedure Laterality Date   • APPENDECTOMY     • CARDIAC CATHETERIZATION  06/05/2013    Cardiac cath 48254 (1)      • CHOLECYSTECTOMY     • INCISION AND DRAINAGE LEG Left 1/29/2021    Procedure: Left foot incision and drainage, tibial sesamoidectomy            (MIN C-ARM);  Surgeon: Gamaliel Starks DPM;  Location: [facility];  Service: Podiatry;  Laterality: Left;   • INJECTION OF MEDICATION  03/28/2016    Injection for nerve block (2)      • OTHER SURGICAL HISTORY  05/05/2016    Incise spinal column/nerves (1)          Family History: family history includes Asthma in her mother; Cancer in her father, maternal grandmother, maternal uncle, and mother; Diabetes in her maternal grandmother and mother; Heart disease in her maternal grandfather and mother; Hyperlipidemia in her father and mother; Mental illness in an other family member; Migraines in her mother; Osteoporosis in her mother. Otherwise pertinent FHx was reviewed and not pertinent to current issue.    Social History:  reports that she has quit smoking. She has never used smokeless tobacco. She reports that she does not drink alcohol or use drugs.    Home Medications:  Famotidine, HYDROcodone-acetaminophen, Loratadine, fluticasone, gabapentin, pantoprazole, sucralfate, sulfamethoxazole-trimethoprim, and valsartan      Allergies:  Allergies   Allergen Reactions   • Amoxicillin-Pot Clavulanate Hives   • Clarithromycin Hives   • Ibuprofen Hives and Nausea And Vomiting   • Naproxen Hives and Nausea And Vomiting   • Nsaids Hives   • Other Other (See Comments)     Hx of MRSA       Objective    Objective   Vitals:  Temp:  [96.5 °F (35.8 °C)-97.4 °F (36.3 °C)] 97.4 °F (36.3 °C)  Heart Rate:  [71-86] 71  Resp:  [18] 18  BP: (120-166)/(69-78) 162/77    Physical Exam  Constitutional:       Appearance: Normal appearance. She  is obese.   Eyes:      General: No scleral icterus.     Pupils: Pupils are equal, round, and reactive to light.   Cardiovascular:      Rate and Rhythm: Normal rate and regular rhythm.      Pulses:           Dorsalis pedis pulses are 1+ on the right side and 1+ on the left side.        Posterior tibial pulses are 1+ on the right side and 1+ on the left side.   Pulmonary:      Effort: Pulmonary effort is normal.      Breath sounds: Normal breath sounds.   Abdominal:      Palpations: Abdomen is soft.      Tenderness: There is no abdominal tenderness.   Musculoskeletal:         General: Swelling present.        Feet:    Feet:      Right foot:      Protective Sensation: 0 sites sensed.      Left foot:      Protective Sensation: 0 sites sensed.      Skin integrity: Ulcer and erythema present.   Skin:     Findings: Erythema present.   Neurological:      Mental Status: She is alert.   Psychiatric:         Mood and Affect: Mood normal.         Behavior: Behavior normal.         Result Review    Result Review:  I have personally reviewed the results from the time of this admission to 3/3/2021 12:53 CST and agree with these findings:  [x]  Laboratory  []  Microbiology  [x]  Radiology  []  EKG/Telemetry   []  Cardiology/Vascular   []  Pathology  []  Old records  []  Other:  MRI left foot with and without contrast.     HISTORY: Foot infection. Open wound medial distal foot.     Prior exam: Left foot March 2, 2021. MRI left foot January 14, 2021.     TECHNIQUE: Multiplanar multi sequence images of the left foot  obtained both prior to and following the intravenous infusion of  20 mL ProHance.     FINDINGS: Evidence of prior bunionectomy and osteotomy.     Bone edema distal one half of the first metatarsal. Cortical  irregularity and bony destruction plantar surface distal aspect  first metatarsal. Soft tissue edema and fluid collections medial  aspect of the foot towards the plantar surface first  metatarsophalangeal joint.  This most likely represents abscess.     In comparison to prior MRI, the soft tissue fluid collection is  now larger and bone edema first metatarsal is more pronounced  therefore indicating bone infection. Unfavorable change.     Surgical screws through the first metatarsal seen on prior exam  have been removed.     IMPRESSION:  Evidence of prior bunionectomy and osteotomy first  metatarsal.     Interval progression of bone edema aspect  of the first  metatarsal suggesting progression of disease, osteomyelitis.  Cortical irregularity, bone destruction plantar surface distal  first metatarsal. Unfavorable change. Soft tissue fluid  collection first plantar surface of the first metatarsophalangeal  joint probably abscess. This is larger than on prior exam.  Unfavorable change.     Surgical screws seen in the first metatarsal on prior MRI  examination have been removed.     MRI left foot is otherwise unremarkable.     Electronically signed by:  Manohar Gaspar MD  3/2/2021 4:38 PM CST  Workstation: [id number]    Assessment/Plan   Assessment / Plan       Active Hospital Problems:  Active Hospital Problems    Diagnosis   • Osteomyelitis of great toe of left foot (CMS/HCC)       Plan:     Patient seen and evaluated.  Wound exam as above.  MRI reviewed.  Changes concerning for small subcutaneous abscess as well as possible osteomyelitis of the first metatarsal.  Patient did undergo hardware removal at this site which may be the cause of the reactive change however at this time I do feel be prudent to reopen the surgical site for appropriate culture and bone biopsy.  Continue vancomycin for now.  Will place n.p.o. after midnight tonight and try to add to the surgical schedule tomorrow.  All risks and benefits and potential complications discussed.          This document has been electronically signed by Gamaliel Starks DPM on March 3, 2021 13:07 CST       Electronically signed by Gamaliel Starks DPM, 03/03/21, 12:53 PM  CST.

## 2021-03-03 NOTE — PROGRESS NOTES
"Pharmacokinetics by Pharmacy - Vancomycin    Haven Mahoney is a 55 y.o. female receiving vancomycin 1250mg IV Q8H day 2 for SSTI      Objective:  [Ht: 152.4 cm (60\"); Wt: 123 kg (270 lb 4.8 oz)]     WBC   Date Value Ref Range Status   03/03/2021 7.46 3.40 - 10.80 10*3/mm3 Final   03/02/2021 9.80 3.40 - 10.80 10*3/mm3 Final   12/08/2020 13.28 (H) 3.40 - 10.80 10*3/mm3 Final   01/21/2019 8.2 4.0 - 11.0 10*3/uL Final      C-Reactive Protein   Date Value Ref Range Status   03/03/2021 1.70 (H) 0.00 - 0.50 mg/dL Final     Lactate   Date Value Ref Range Status   03/17/2020 1.5 0.5 - 2.0 mmol/L Final      Temp Readings from Last 1 Encounters:   03/03/21 97.4 °F (36.3 °C) (Oral)     Estimated Creatinine Clearance: 114.9 mL/min (by C-G formula based on SCr of 0.66 mg/dL).   Creatinine   Date Value Ref Range Status   03/03/2021 0.66 0.57 - 1.00 mg/dL Final   03/02/2021 0.66 0.57 - 1.00 mg/dL Final   12/08/2020 0.70 0.57 - 1.00 mg/dL Final       No results found for: VANCOPEAK, VANCOTROUGH, VANCORANDOM    Culture Results:  Microbiology Results (last 10 days)       Procedure Component Value - Date/Time    Blood Culture - Blood, Arm, Right [[id number]] Collected: 03/02/21 1408    Lab Status: Preliminary result Specimen: Blood from Arm, Right Updated: 03/03/21 1415     Blood Culture No growth at 24 hours    COVID-19 and FLU A/B PCR - Swab, Nasopharynx [[id number]]  (Normal) Collected: 03/02/21 1402    Lab Status: Final result Specimen: Swab from Nasopharynx Updated: 03/02/21 1432     COVID19 Not Detected     Influenza A PCR Not Detected     Influenza B PCR Not Detected    Narrative:      Fact sheet for providers: https://www.fda.gov/media/615381/download    Fact sheet for patients: https://www.fda.gov/media/713890/download    Test performed by PCR.          No results found for: RESPCX      Assessment:  WBC WNL  SCr WNL  Afebrile    Labs in progress:  3/2 BCx2 NG24H  3/3 BCx2 IP    Osteomyelitis  Reopening surgical site for " culture and bone biopsy per notes.  Continuing vancomycin.    No clinical pharmacist here tonight, will order after the fourth and before the fifth for tomorrow.    Utilizing AUC dosing  Goal AUC for SSTI/osteo: 400-600    Plan:  1. Continue vancomycin 1250mg IV Q8H  2. Peak 3/4 0100 Trough 3/4 0600  3. Pharmacy will monitor renal function and adjust dose accordingly.      Devante Marino, PharmD   03/03/21 15:28 CST

## 2021-03-03 NOTE — ANESTHESIA PREPROCEDURE EVALUATION
Anesthesia Evaluation     Patient summary reviewed and Nursing notes reviewed   no history of anesthetic complications:  NPO Solid Status: > 8 hours  NPO Liquid Status: > 2 hours           Airway   Mallampati: II  TM distance: >3 FB  Neck ROM: full  Possible difficult intubation and Large neck circumference  Comment: General anesthesia 1/29/21. LMA #4.  Dental - normal exam     Pulmonary - normal exam    breath sounds clear to auscultation  (-) COPD, asthma, recent URI, sleep apnea, rhonchi, decreased breath sounds, wheezes, not a smoker  Cardiovascular   Exercise tolerance: poor (<4 METS)    ECG reviewed  Rhythm: regular  Rate: normal    (+) hypertension poorly controlled less than 2 medications, valvular problems/murmurs murmur and AS, murmur (Grade II/VI systolic), hyperlipidemia,   (-) pacemaker, past MI, CAD, dysrhythmias, angina, CHF, cardiac stents, DVT    ROS comment: Normal sinus rhythm  Moderate voltage criteria for LVH, may be normal variant  Inferior infarct , age undetermined  Abnormal ECG  When compared with ECG of 06-FEB-2020 09:44,  no sig change  Confirmed by CAROLYN BURT ([id number]) on 3/23/2020 3:06:13 PM    · The study is technically difficult for diagnosis  · Estimated EF = 65%.  · Left ventricular systolic function is normal.  · Mild aortic valve stenosis is present.  · Ao max PG 22.7 mmHg Ao mean PG 11 mmHg       Neuro/Psych  (+) seizures (none since 2011) well controlled,     (-) TIA, CVA, headaches, weakness, numbness, psychiatric history  GI/Hepatic/Renal/Endo    (+) morbid obesity, GERD well controlled,    (-) hepatitis, liver disease, no renal disease, diabetes (Most recent glucoses are slightly elevated.), no thyroid disorder    Musculoskeletal     (+) arthralgias, back pain (Lumbar DDD), chronic pain,   Abdominal   (+) obese,    Substance History - negative use  (-) alcohol use, drug use     OB/GYN negative ob/gyn ROS         Other   arthritis,      (-) history of cancer  ROS/Med Hx Other:  BMI 53    GERD controlled on pepcid    TTE 5/29/2020:  ·The study is technically difficult for diagnosis  ·Estimated EF = 65%.  ·Left ventricular systolic function is normal.  ·Mild aortic valve stenosis is present.  ·Ao max PG 22.7 mmHg Ao mean PG 11 mmHg    Hx of seizures- last seizure was in 2012. Controlled off medications.      Phys Exam Other: BMI 53                Anesthesia Plan    ASA 4     general     intravenous induction     Anesthetic plan, all risks, benefits, and alternatives have been provided, discussed and informed consent has been obtained with: patient.

## 2021-03-03 NOTE — CONSULTS
[facility]   Consult Note    Patient Name: Haven Mahoney  : 1965  MRN: [id number]  Primary Care Physician: Asif Lin MD  Referring Physician: Sampson Davis MD  Date of admission: 3/2/2021    Inpatient Podiatry Consult  Consult performed by: Gamaliel Starks DPM  Consult ordered by: Milad Castro DO  Reason for consult: Left foot infection        Subjective   Subjective     Reason for Consult/ Chief Complaint: Left foot pain, infection    Haven Mahoney female 55 y.o. admitted for worsening left forefoot infection, failed outpatient antibiotics.  Previously underwent incision and drainage with tibial sesamoidectomy and hardware removal with me on 2021.  Has been progressing well however noticed increased redness pain and drainage over the past week.  This failed to improve on clindamycin as outpatient.  Wound Infection  This is a recurrent problem. The current episode started in the past 7 days. The problem occurs constantly. The problem has been unchanged. Associated symptoms include arthralgias. The symptoms are aggravated by bending and standing. Treatments tried: Antibiotics. The treatment provided no relief.       Review of Systems   Constitutional: Negative.    Respiratory: Negative.    Cardiovascular: Positive for leg swelling.   Musculoskeletal: Positive for arthralgias.   Skin: Positive for color change and wound.   Neurological: Negative.    Psychiatric/Behavioral: Negative.         Personal History     Past Medical History:   Diagnosis Date   • Anxiety    • Arthritis    • Arthropathy of lumbar facet joint    • Callus    • Chronic depression    • Degeneration of lumbar intervertebral disc    • Drug therapy     Other long term (current) drug therapy      • Epilepsy (CMS/HCC)    • Foot pain, left    • Headache    • Hearing loss    • Heart problem    • High blood pressure    • Hypercholesterolemia    • Hypertensive disorder    • Obesity    • Osteoporosis    •  Osteoporosis    • Pancreatitis    • Seizures (CMS/HCC)    • Varicose veins of lower extremity    • Wears glasses        Past Surgical History:   Procedure Laterality Date   • APPENDECTOMY     • CARDIAC CATHETERIZATION  06/05/2013    Cardiac cath 22114 (1)      • CHOLECYSTECTOMY     • INCISION AND DRAINAGE LEG Left 1/29/2021    Procedure: Left foot incision and drainage, tibial sesamoidectomy            (MIN C-ARM);  Surgeon: Gamaliel Starks DPM;  Location: [facility];  Service: Podiatry;  Laterality: Left;   • INJECTION OF MEDICATION  03/28/2016    Injection for nerve block (2)      • OTHER SURGICAL HISTORY  05/05/2016    Incise spinal column/nerves (1)          Family History: family history includes Asthma in her mother; Cancer in her father, maternal grandmother, maternal uncle, and mother; Diabetes in her maternal grandmother and mother; Heart disease in her maternal grandfather and mother; Hyperlipidemia in her father and mother; Mental illness in an other family member; Migraines in her mother; Osteoporosis in her mother. Otherwise pertinent FHx was reviewed and not pertinent to current issue.    Social History:  reports that she has quit smoking. She has never used smokeless tobacco. She reports that she does not drink alcohol or use drugs.    Home Medications:  Famotidine, HYDROcodone-acetaminophen, Loratadine, fluticasone, gabapentin, pantoprazole, sucralfate, sulfamethoxazole-trimethoprim, and valsartan      Allergies:  Allergies   Allergen Reactions   • Amoxicillin-Pot Clavulanate Hives   • Clarithromycin Hives   • Ibuprofen Hives and Nausea And Vomiting   • Naproxen Hives and Nausea And Vomiting   • Nsaids Hives   • Other Other (See Comments)     Hx of MRSA       Objective    Objective   Vitals:  Temp:  [96.5 °F (35.8 °C)-97.4 °F (36.3 °C)] 97.4 °F (36.3 °C)  Heart Rate:  [71-86] 71  Resp:  [18] 18  BP: (120-166)/(69-78) 162/77    Physical Exam  Constitutional:       Appearance: Normal appearance. She  is obese.   Eyes:      General: No scleral icterus.     Pupils: Pupils are equal, round, and reactive to light.   Cardiovascular:      Rate and Rhythm: Normal rate and regular rhythm.      Pulses:           Dorsalis pedis pulses are 1+ on the right side and 1+ on the left side.        Posterior tibial pulses are 1+ on the right side and 1+ on the left side.   Pulmonary:      Effort: Pulmonary effort is normal.      Breath sounds: Normal breath sounds.   Abdominal:      Palpations: Abdomen is soft.      Tenderness: There is no abdominal tenderness.   Musculoskeletal:         General: Swelling present.        Feet:    Feet:      Right foot:      Protective Sensation: 0 sites sensed.      Left foot:      Protective Sensation: 0 sites sensed.      Skin integrity: Ulcer and erythema present.   Skin:     Findings: Erythema present.   Neurological:      Mental Status: She is alert.   Psychiatric:         Mood and Affect: Mood normal.         Behavior: Behavior normal.         Result Review    Result Review:  I have personally reviewed the results from the time of this admission to 3/3/2021 12:53 CST and agree with these findings:  [x]  Laboratory  []  Microbiology  [x]  Radiology  []  EKG/Telemetry   []  Cardiology/Vascular   []  Pathology  []  Old records  []  Other:  MRI left foot with and without contrast.     HISTORY: Foot infection. Open wound medial distal foot.     Prior exam: Left foot March 2, 2021. MRI left foot January 14, 2021.     TECHNIQUE: Multiplanar multi sequence images of the left foot  obtained both prior to and following the intravenous infusion of  20 mL ProHance.     FINDINGS: Evidence of prior bunionectomy and osteotomy.     Bone edema distal one half of the first metatarsal. Cortical  irregularity and bony destruction plantar surface distal aspect  first metatarsal. Soft tissue edema and fluid collections medial  aspect of the foot towards the plantar surface first  metatarsophalangeal joint.  This most likely represents abscess.     In comparison to prior MRI, the soft tissue fluid collection is  now larger and bone edema first metatarsal is more pronounced  therefore indicating bone infection. Unfavorable change.     Surgical screws through the first metatarsal seen on prior exam  have been removed.     IMPRESSION:  Evidence of prior bunionectomy and osteotomy first  metatarsal.     Interval progression of bone edema aspect  of the first  metatarsal suggesting progression of disease, osteomyelitis.  Cortical irregularity, bone destruction plantar surface distal  first metatarsal. Unfavorable change. Soft tissue fluid  collection first plantar surface of the first metatarsophalangeal  joint probably abscess. This is larger than on prior exam.  Unfavorable change.     Surgical screws seen in the first metatarsal on prior MRI  examination have been removed.     MRI left foot is otherwise unremarkable.     Electronically signed by:  Manohar Gaspar MD  3/2/2021 4:38 PM CST  Workstation: [id number]    Assessment/Plan   Assessment / Plan       Active Hospital Problems:  Active Hospital Problems    Diagnosis   • Osteomyelitis of great toe of left foot (CMS/HCC)       Plan:     Patient seen and evaluated.  Wound exam as above.  MRI reviewed.  Changes concerning for small subcutaneous abscess as well as possible osteomyelitis of the first metatarsal.  Patient did undergo hardware removal at this site which may be the cause of the reactive change however at this time I do feel be prudent to reopen the surgical site for appropriate culture and bone biopsy.  Continue vancomycin for now.  Will place n.p.o. after midnight tonight and try to add to the surgical schedule tomorrow.  All risks and benefits and potential complications discussed.          This document has been electronically signed by Gamaliel Starks DPM on March 3, 2021 13:07 CST       Electronically signed by Gamaliel Starks DPM, 03/03/21, 12:53 PM  CST.

## 2021-03-04 ENCOUNTER — ANESTHESIA (OUTPATIENT)
Dept: PERIOP | Facility: HOSPITAL | Age: 56
End: 2021-03-04

## 2021-03-04 LAB
ANION GAP SERPL CALCULATED.3IONS-SCNC: 9 MMOL/L (ref 5–15)
BASOPHILS # BLD AUTO: 0.03 10*3/MM3 (ref 0–0.2)
BASOPHILS NFR BLD AUTO: 0.4 % (ref 0–1.5)
BUN SERPL-MCNC: 8 MG/DL (ref 6–20)
BUN/CREAT SERPL: 14.5 (ref 7–25)
CALCIUM SPEC-SCNC: 9.3 MG/DL (ref 8.6–10.5)
CHLORIDE SERPL-SCNC: 102 MMOL/L (ref 98–107)
CO2 SERPL-SCNC: 28 MMOL/L (ref 22–29)
CREAT SERPL-MCNC: 0.55 MG/DL (ref 0.57–1)
DEPRECATED RDW RBC AUTO: 40.7 FL (ref 37–54)
EOSINOPHIL # BLD AUTO: 0.09 10*3/MM3 (ref 0–0.4)
EOSINOPHIL NFR BLD AUTO: 1.1 % (ref 0.3–6.2)
ERYTHROCYTE [DISTWIDTH] IN BLOOD BY AUTOMATED COUNT: 13.3 % (ref 12.3–15.4)
GFR SERPL CREATININE-BSD FRML MDRD: 114 ML/MIN/1.73
GLUCOSE SERPL-MCNC: 109 MG/DL (ref 65–99)
HCT VFR BLD AUTO: 36.7 % (ref 34–46.6)
HGB BLD-MCNC: 12.2 G/DL (ref 12–15.9)
IMM GRANULOCYTES # BLD AUTO: 0.06 10*3/MM3 (ref 0–0.05)
IMM GRANULOCYTES NFR BLD AUTO: 0.8 % (ref 0–0.5)
LYMPHOCYTES # BLD AUTO: 1.67 10*3/MM3 (ref 0.7–3.1)
LYMPHOCYTES NFR BLD AUTO: 21.2 % (ref 19.6–45.3)
MCH RBC QN AUTO: 27.7 PG (ref 26.6–33)
MCHC RBC AUTO-ENTMCNC: 33.2 G/DL (ref 31.5–35.7)
MCV RBC AUTO: 83.2 FL (ref 79–97)
MONOCYTES # BLD AUTO: 0.48 10*3/MM3 (ref 0.1–0.9)
MONOCYTES NFR BLD AUTO: 6.1 % (ref 5–12)
NEUTROPHILS NFR BLD AUTO: 5.54 10*3/MM3 (ref 1.7–7)
NEUTROPHILS NFR BLD AUTO: 70.4 % (ref 42.7–76)
NRBC BLD AUTO-RTO: 0 /100 WBC (ref 0–0.2)
PLATELET # BLD AUTO: 173 10*3/MM3 (ref 140–450)
PMV BLD AUTO: 9 FL (ref 6–12)
POTASSIUM SERPL-SCNC: 3.8 MMOL/L (ref 3.5–5.2)
RBC # BLD AUTO: 4.41 10*6/MM3 (ref 3.77–5.28)
SODIUM SERPL-SCNC: 139 MMOL/L (ref 136–145)
VANCOMYCIN PEAK SERPL-MCNC: 42.5 MCG/ML (ref 20–40)
VANCOMYCIN TROUGH SERPL-MCNC: 28.3 MCG/ML (ref 5–20)
WBC # BLD AUTO: 7.87 10*3/MM3 (ref 3.4–10.8)

## 2021-03-04 PROCEDURE — 25010000002 VANCOMYCIN 1 G RECONSTITUTED SOLUTION: Performed by: PODIATRIST

## 2021-03-04 PROCEDURE — 80202 ASSAY OF VANCOMYCIN: CPT | Performed by: HOSPITALIST

## 2021-03-04 PROCEDURE — 25010000002 FENTANYL CITRATE (PF) 100 MCG/2ML SOLUTION: Performed by: NURSE ANESTHETIST, CERTIFIED REGISTERED

## 2021-03-04 PROCEDURE — 88307 TISSUE EXAM BY PATHOLOGIST: CPT

## 2021-03-04 PROCEDURE — 25010000002 MORPHINE PER 10 MG: Performed by: PODIATRIST

## 2021-03-04 PROCEDURE — 88311 DECALCIFY TISSUE: CPT

## 2021-03-04 PROCEDURE — 25010000002 MIDAZOLAM PER 1 MG: Performed by: NURSE ANESTHETIST, CERTIFIED REGISTERED

## 2021-03-04 PROCEDURE — 80048 BASIC METABOLIC PNL TOTAL CA: CPT | Performed by: HOSPITALIST

## 2021-03-04 PROCEDURE — 25010000003 LIDOCAINE 1 % SOLUTION: Performed by: NURSE ANESTHETIST, CERTIFIED REGISTERED

## 2021-03-04 PROCEDURE — 25010000002 MORPHINE PER 10 MG: Performed by: HOSPITALIST

## 2021-03-04 PROCEDURE — 0QBP3ZX EXCISION OF LEFT METATARSAL, PERCUTANEOUS APPROACH, DIAGNOSTIC: ICD-10-PCS | Performed by: PODIATRIST

## 2021-03-04 PROCEDURE — 25010000002 ONDANSETRON PER 1 MG: Performed by: NURSE ANESTHETIST, CERTIFIED REGISTERED

## 2021-03-04 PROCEDURE — 0J9R0ZZ DRAINAGE OF LEFT FOOT SUBCUTANEOUS TISSUE AND FASCIA, OPEN APPROACH: ICD-10-PCS | Performed by: PODIATRIST

## 2021-03-04 PROCEDURE — 20240 BONE BIOPSY OPEN SUPERFICIAL: CPT | Performed by: PODIATRIST

## 2021-03-04 PROCEDURE — 25010000002 HEPARIN (PORCINE) PER 1000 UNITS: Performed by: PODIATRIST

## 2021-03-04 PROCEDURE — 28002 TREATMENT OF FOOT INFECTION: CPT | Performed by: PODIATRIST

## 2021-03-04 PROCEDURE — 87070 CULTURE OTHR SPECIMN AEROBIC: CPT | Performed by: PODIATRIST

## 2021-03-04 PROCEDURE — 87186 SC STD MICRODIL/AGAR DIL: CPT | Performed by: PODIATRIST

## 2021-03-04 PROCEDURE — 25010000002 PROPOFOL 10 MG/ML EMULSION: Performed by: NURSE ANESTHETIST, CERTIFIED REGISTERED

## 2021-03-04 PROCEDURE — 85025 COMPLETE CBC W/AUTO DIFF WBC: CPT | Performed by: HOSPITALIST

## 2021-03-04 PROCEDURE — 87147 CULTURE TYPE IMMUNOLOGIC: CPT | Performed by: PODIATRIST

## 2021-03-04 PROCEDURE — 87205 SMEAR GRAM STAIN: CPT | Performed by: PODIATRIST

## 2021-03-04 RX ORDER — FENTANYL CITRATE 50 UG/ML
INJECTION, SOLUTION INTRAMUSCULAR; INTRAVENOUS AS NEEDED
Status: DISCONTINUED | OUTPATIENT
Start: 2021-03-04 | End: 2021-03-04 | Stop reason: SURG

## 2021-03-04 RX ORDER — KETAMINE HYDROCHLORIDE 100 MG/ML
INJECTION INTRAMUSCULAR; INTRAVENOUS AS NEEDED
Status: DISCONTINUED | OUTPATIENT
Start: 2021-03-04 | End: 2021-03-04 | Stop reason: SURG

## 2021-03-04 RX ORDER — MIDAZOLAM HYDROCHLORIDE 1 MG/ML
INJECTION INTRAMUSCULAR; INTRAVENOUS AS NEEDED
Status: DISCONTINUED | OUTPATIENT
Start: 2021-03-04 | End: 2021-03-04 | Stop reason: SURG

## 2021-03-04 RX ORDER — BUPIVACAINE HYDROCHLORIDE 5 MG/ML
INJECTION, SOLUTION EPIDURAL; INTRACAUDAL AS NEEDED
Status: DISCONTINUED | OUTPATIENT
Start: 2021-03-04 | End: 2021-03-04 | Stop reason: HOSPADM

## 2021-03-04 RX ORDER — LIDOCAINE HYDROCHLORIDE 10 MG/ML
INJECTION, SOLUTION INFILTRATION; PERINEURAL AS NEEDED
Status: DISCONTINUED | OUTPATIENT
Start: 2021-03-04 | End: 2021-03-04 | Stop reason: SURG

## 2021-03-04 RX ORDER — PROPOFOL 10 MG/ML
VIAL (ML) INTRAVENOUS AS NEEDED
Status: DISCONTINUED | OUTPATIENT
Start: 2021-03-04 | End: 2021-03-04 | Stop reason: SURG

## 2021-03-04 RX ORDER — ONDANSETRON 2 MG/ML
INJECTION INTRAMUSCULAR; INTRAVENOUS AS NEEDED
Status: DISCONTINUED | OUTPATIENT
Start: 2021-03-04 | End: 2021-03-04 | Stop reason: SURG

## 2021-03-04 RX ORDER — ONDANSETRON 2 MG/ML
4 INJECTION INTRAMUSCULAR; INTRAVENOUS ONCE AS NEEDED
Status: DISCONTINUED | OUTPATIENT
Start: 2021-03-04 | End: 2021-03-04 | Stop reason: HOSPADM

## 2021-03-04 RX ADMIN — MORPHINE SULFATE 4 MG: 4 INJECTION INTRAVENOUS at 03:22

## 2021-03-04 RX ADMIN — FENTANYL CITRATE 25 MCG: 50 INJECTION INTRAMUSCULAR; INTRAVENOUS at 12:06

## 2021-03-04 RX ADMIN — HEPARIN SODIUM 5000 UNITS: 5000 INJECTION INTRAVENOUS; SUBCUTANEOUS at 21:36

## 2021-03-04 RX ADMIN — MORPHINE SULFATE 4 MG: 4 INJECTION INTRAVENOUS at 08:29

## 2021-03-04 RX ADMIN — KETAMINE HYDROCHLORIDE 10 MG: 100 INJECTION INTRAMUSCULAR; INTRAVENOUS at 11:54

## 2021-03-04 RX ADMIN — MIDAZOLAM HYDROCHLORIDE 2 MG: 2 INJECTION, SOLUTION INTRAMUSCULAR; INTRAVENOUS at 11:31

## 2021-03-04 RX ADMIN — SODIUM CHLORIDE 75 ML/HR: 9 INJECTION, SOLUTION INTRAVENOUS at 14:21

## 2021-03-04 RX ADMIN — VANCOMYCIN HYDROCHLORIDE 1000 MG: 1 INJECTION, POWDER, LYOPHILIZED, FOR SOLUTION INTRAVENOUS at 17:30

## 2021-03-04 RX ADMIN — FENTANYL CITRATE 25 MCG: 50 INJECTION INTRAMUSCULAR; INTRAVENOUS at 11:44

## 2021-03-04 RX ADMIN — ONDANSETRON 4 MG: 2 INJECTION INTRAMUSCULAR; INTRAVENOUS at 12:09

## 2021-03-04 RX ADMIN — PANTOPRAZOLE SODIUM 40 MG: 40 TABLET, DELAYED RELEASE ORAL at 05:41

## 2021-03-04 RX ADMIN — SODIUM CHLORIDE 75 ML/HR: 9 INJECTION, SOLUTION INTRAVENOUS at 08:29

## 2021-03-04 RX ADMIN — KETAMINE HYDROCHLORIDE 10 MG: 100 INJECTION INTRAMUSCULAR; INTRAVENOUS at 11:42

## 2021-03-04 RX ADMIN — MORPHINE SULFATE 4 MG: 4 INJECTION INTRAVENOUS at 17:29

## 2021-03-04 RX ADMIN — FENTANYL CITRATE 50 MCG: 50 INJECTION INTRAMUSCULAR; INTRAVENOUS at 11:41

## 2021-03-04 RX ADMIN — MORPHINE SULFATE 4 MG: 4 INJECTION INTRAVENOUS at 14:21

## 2021-03-04 RX ADMIN — PROPOFOL 180 MG: 10 INJECTION, EMULSION INTRAVENOUS at 11:37

## 2021-03-04 RX ADMIN — MORPHINE SULFATE 4 MG: 4 INJECTION INTRAVENOUS at 21:36

## 2021-03-04 RX ADMIN — LIDOCAINE HYDROCHLORIDE 50 MG: 10 INJECTION, SOLUTION INFILTRATION; PERINEURAL at 11:37

## 2021-03-04 NOTE — BRIEF OP NOTE
INCISION AND DRAINAGE LOWER EXTREMITY  Progress Note    Haven Mahoney  3/4/2021    Pre-op Diagnosis:   Osteomyelitis of great toe of left foot (CMS/HCC) [M86.9]       Post-Op Diagnosis Codes:     * Osteomyelitis of great toe of left foot (CMS/HCC) [M86.9]    Procedure/CPT® Codes:        Procedure(s):  Left foot incision and drainage, bone biopsy    Surgeon(s):  Gamaliel Starks DPM    Anesthesia: Choice    Staff:   Circulator: Beverly Roldan RN; Isis Boucher RN  Scrub Person: Tino Yepez  Assistant: Lula Duran MA  Assistant: Lula Duran MA      Estimated Blood Loss: minimal    Urine Voided: * No values recorded between 3/4/2021 11:31 AM and 3/4/2021 12:16 PM *    Specimens:                Specimens     ID Source Type Tests Collected By Collected At Frozen?      1 Toe, Left Wound · WOUND CULTURE   Gamaliel Starks DPM 3/4/21 1212      Description: c and s left great toe    This specimen was not marked as sent.    A Toe, Left Bone · TISSUE PATHOLOGY EXAM   Gamaliel Starks DPM 3/4/21 1209 No     Description: bone biopsy left first metatarsal    This specimen was not marked as sent.                Drains: * No LDAs found *    Findings: Consistent with diagnosis. Small abscess medial to 1st MTPJ    Complications: None    Assistant: Lula Duran MA  was responsible for performing the following activities: Retraction and Suction and their skilled assistance was necessary for the success of this case.          This document has been electronically signed by Gamaliel Starks DPM on March 4, 2021 12:16 CST     Gamaliel Starks DPM     Date: 3/4/2021  Time: 12:16 CST

## 2021-03-04 NOTE — PROGRESS NOTES
"Pharmacokinetics by Pharmacy - Vancomycin    Haven Mahoney is a 55 y.o. female receiving vancomycin 1250mg IV Q8H day 3 for SSTI    Objective:  [Ht: 152.4 cm (60\"); Wt: 123 kg (270 lb 4.8 oz)]     WBC   Date Value Ref Range Status   03/04/2021 7.87 3.40 - 10.80 10*3/mm3 Final   03/03/2021 7.46 3.40 - 10.80 10*3/mm3 Final   03/02/2021 9.80 3.40 - 10.80 10*3/mm3 Final   01/21/2019 8.2 4.0 - 11.0 10*3/uL Final      C-Reactive Protein   Date Value Ref Range Status   03/03/2021 1.70 (H) 0.00 - 0.50 mg/dL Final     Lactate   Date Value Ref Range Status   03/17/2020 1.5 0.5 - 2.0 mmol/L Final      Temp Readings from Last 1 Encounters:   03/04/21 97.1 °F (36.2 °C) (Axillary)     Estimated Creatinine Clearance: 137.9 mL/min (A) (by C-G formula based on SCr of 0.55 mg/dL (L)).   Creatinine   Date Value Ref Range Status   03/04/2021 0.55 (L) 0.57 - 1.00 mg/dL Final   03/03/2021 0.66 0.57 - 1.00 mg/dL Final   03/02/2021 0.66 0.57 - 1.00 mg/dL Final       Vancomycin Peak   Date Value Ref Range Status   03/04/2021 42.50 (C) 20.00 - 40.00 mcg/mL Final     Vancomycin Trough   Date Value Ref Range Status   03/04/2021 28.30 (C) 5.00 - 20.00 mcg/mL Final       Culture Results:  Microbiology Results (last 10 days)       Procedure Component Value - Date/Time    Blood Culture - Blood, Hand, Right [[id number]] Collected: 03/03/21 0804    Lab Status: Preliminary result Specimen: Blood from Hand, Right Updated: 03/04/21 0830     Blood Culture No growth at 24 hours    Blood Culture - Blood, Arm, Right [[id number]] Collected: 03/02/21 1408    Lab Status: Preliminary result Specimen: Blood from Arm, Right Updated: 03/03/21 1415     Blood Culture No growth at 24 hours    COVID-19 and FLU A/B PCR - Swab, Nasopharynx [[id number]]  (Normal) Collected: 03/02/21 1402    Lab Status: Final result Specimen: Swab from Nasopharynx Updated: 03/02/21 1432     COVID19 Not Detected     Influenza A PCR Not Detected     Influenza B PCR Not Detected    " Narrative:      Fact sheet for providers: https://www.fda.gov/media/891397/download    Fact sheet for patients: https://www.fda.gov/media/903758/download    Test performed by PCR.          No results found for: RESPCX      Assessment:  WBC WNL  SCr WNL  Afebrile    Labs in progress:  3/2 BCx1 NG24H  3/3 BCx1 NG24H    Osteomyelitis  Reopening surgical site for culture and bone biopsy per notes.    Peak 3/4 0046 is 42.5, drawn 45 min late, approx 44.6  Trough 3/4 0630 is 28.3, drawn appropriately  These values give estimated AUC of 900, above goal 400-600.  Patient most likely accumulating due to BMI of 53.  Will change vancomycin to 1000 mg IV q12h, for new , trough 13.  Time to trough of 12.5 is 12 hours from last trough time, will restart vancomycin at 1800.  K = 0.075  T1/2 = 9.3 hr  AdjBW = 76.5 kg      Plan:  1. Change to vancomycin 1000mg IV Q12H  2. Peak 3/5 2000, trough 3/6 0530  3. Pharmacy will monitor renal function and adjust dose accordingly.      Devante Marino, PharmD   03/04/21 09:05 CST

## 2021-03-04 NOTE — ANESTHESIA PROCEDURE NOTES
Airway  Date/Time: 3/4/2021 11:39 AM    General Information and Staff    Patient location during procedure: OR  CRNA: Mike Llanos CRNA    Indications and Patient Condition  Indications for airway management: airway protection    Preoxygenated: yes  MILS maintained throughout  Mask difficulty assessment: 0 - not attempted    Final Airway Details  Final airway type: supraglottic airway      Successful airway: LMA  Size 4    Number of attempts at approach: 1  Assessment: lips, teeth, and gum same as pre-op and atraumatic intubation

## 2021-03-04 NOTE — PLAN OF CARE
Goal Outcome Evaluation:  Plan of Care Reviewed With: patient  Progress: no change  Outcome Summary: pt vss, c/o left foot pain, pain managed with IV pain medication, surgery to be on 3/4., NPO since midnight, no other complaints at this time

## 2021-03-04 NOTE — INTERVAL H&P NOTE
H&P reviewed. The patient was examined and there are no changes to the H&P.   Proceed as planned.          This document has been electronically signed by Gamaliel Starks DPM on March 4, 2021 11:47 CST

## 2021-03-04 NOTE — ANESTHESIA POSTPROCEDURE EVALUATION
Patient: Haven Mahoney    Procedure Summary     Date: 03/04/21 Room / Location: [facility] OR  / [facility] OR    Anesthesia Start: 1132 Anesthesia Stop: 1224    Procedure: Left foot incision and drainage, bone biopsy (Left ) Diagnosis:       Osteomyelitis of great toe of left foot (CMS/HCC)      (Osteomyelitis of great toe of left foot (CMS/HCC) [M86.9])    Surgeon: Gamaliel Starks DPM Provider: Americo Henry MD    Anesthesia Type: general ASA Status: 4          Anesthesia Type: general    Vitals  No vitals data found for the desired time range.          Post Anesthesia Care and Evaluation    Patient location during evaluation: PACU  Level of consciousness: sleepy but conscious and responsive to physical stimuli  Pain score: 0  Pain management: adequate  Airway patency: patent  Anesthetic complications: No anesthetic complications  PONV Status: none  Cardiovascular status: acceptable and hemodynamically stable  Respiratory status: acceptable and spontaneous ventilation  Hydration status: acceptable

## 2021-03-05 ENCOUNTER — APPOINTMENT (OUTPATIENT)
Dept: ULTRASOUND IMAGING | Facility: HOSPITAL | Age: 56
End: 2021-03-05

## 2021-03-05 ENCOUNTER — APPOINTMENT (OUTPATIENT)
Dept: GENERAL RADIOLOGY | Facility: HOSPITAL | Age: 56
End: 2021-03-05

## 2021-03-05 ENCOUNTER — APPOINTMENT (OUTPATIENT)
Dept: INTERVENTIONAL RADIOLOGY/VASCULAR | Facility: HOSPITAL | Age: 56
End: 2021-03-05

## 2021-03-05 LAB
ANION GAP SERPL CALCULATED.3IONS-SCNC: 8 MMOL/L (ref 5–15)
BASOPHILS # BLD AUTO: 0.04 10*3/MM3 (ref 0–0.2)
BASOPHILS NFR BLD AUTO: 0.6 % (ref 0–1.5)
BUN SERPL-MCNC: 8 MG/DL (ref 6–20)
BUN/CREAT SERPL: 12.7 (ref 7–25)
CALCIUM SPEC-SCNC: 8.6 MG/DL (ref 8.6–10.5)
CHLORIDE SERPL-SCNC: 103 MMOL/L (ref 98–107)
CO2 SERPL-SCNC: 29 MMOL/L (ref 22–29)
CREAT SERPL-MCNC: 0.63 MG/DL (ref 0.57–1)
DEPRECATED RDW RBC AUTO: 42.3 FL (ref 37–54)
EOSINOPHIL # BLD AUTO: 0.07 10*3/MM3 (ref 0–0.4)
EOSINOPHIL NFR BLD AUTO: 1 % (ref 0.3–6.2)
ERYTHROCYTE [DISTWIDTH] IN BLOOD BY AUTOMATED COUNT: 13.6 % (ref 12.3–15.4)
GFR SERPL CREATININE-BSD FRML MDRD: 98 ML/MIN/1.73
GLUCOSE SERPL-MCNC: 96 MG/DL (ref 65–99)
HCT VFR BLD AUTO: 35.4 % (ref 34–46.6)
HGB BLD-MCNC: 11.5 G/DL (ref 12–15.9)
IMM GRANULOCYTES # BLD AUTO: 0.07 10*3/MM3 (ref 0–0.05)
IMM GRANULOCYTES NFR BLD AUTO: 1 % (ref 0–0.5)
LYMPHOCYTES # BLD AUTO: 1.51 10*3/MM3 (ref 0.7–3.1)
LYMPHOCYTES NFR BLD AUTO: 21.6 % (ref 19.6–45.3)
MCH RBC QN AUTO: 27.7 PG (ref 26.6–33)
MCHC RBC AUTO-ENTMCNC: 32.5 G/DL (ref 31.5–35.7)
MCV RBC AUTO: 85.3 FL (ref 79–97)
MONOCYTES # BLD AUTO: 0.45 10*3/MM3 (ref 0.1–0.9)
MONOCYTES NFR BLD AUTO: 6.4 % (ref 5–12)
NEUTROPHILS NFR BLD AUTO: 4.86 10*3/MM3 (ref 1.7–7)
NEUTROPHILS NFR BLD AUTO: 69.4 % (ref 42.7–76)
NRBC BLD AUTO-RTO: 0 /100 WBC (ref 0–0.2)
PLATELET # BLD AUTO: 162 10*3/MM3 (ref 140–450)
PMV BLD AUTO: 9.3 FL (ref 6–12)
POTASSIUM SERPL-SCNC: 3.8 MMOL/L (ref 3.5–5.2)
RBC # BLD AUTO: 4.15 10*6/MM3 (ref 3.77–5.28)
SODIUM SERPL-SCNC: 140 MMOL/L (ref 136–145)
VANCOMYCIN PEAK SERPL-MCNC: 24.6 MCG/ML (ref 20–40)
WBC # BLD AUTO: 7 10*3/MM3 (ref 3.4–10.8)

## 2021-03-05 PROCEDURE — 25010000002 MORPHINE PER 10 MG: Performed by: PODIATRIST

## 2021-03-05 PROCEDURE — 25010000002 VANCOMYCIN 1 G RECONSTITUTED SOLUTION: Performed by: PODIATRIST

## 2021-03-05 PROCEDURE — 25010000002 HEPARIN (PORCINE) PER 1000 UNITS: Performed by: PODIATRIST

## 2021-03-05 PROCEDURE — 02HV33Z INSERTION OF INFUSION DEVICE INTO SUPERIOR VENA CAVA, PERCUTANEOUS APPROACH: ICD-10-PCS | Performed by: HOSPITALIST

## 2021-03-05 PROCEDURE — 80202 ASSAY OF VANCOMYCIN: CPT | Performed by: PODIATRIST

## 2021-03-05 PROCEDURE — 25010000002 MORPHINE PER 10 MG: Performed by: HOSPITALIST

## 2021-03-05 PROCEDURE — C1751 CATH, INF, PER/CENT/MIDLINE: HCPCS

## 2021-03-05 PROCEDURE — 80048 BASIC METABOLIC PNL TOTAL CA: CPT | Performed by: PODIATRIST

## 2021-03-05 PROCEDURE — 99024 POSTOP FOLLOW-UP VISIT: CPT | Performed by: PODIATRIST

## 2021-03-05 PROCEDURE — 85025 COMPLETE CBC W/AUTO DIFF WBC: CPT | Performed by: PODIATRIST

## 2021-03-05 RX ORDER — HYDROCODONE BITARTRATE AND ACETAMINOPHEN 10; 325 MG/1; MG/1
1 TABLET ORAL ONCE
Status: COMPLETED | OUTPATIENT
Start: 2021-03-05 | End: 2021-03-05

## 2021-03-05 RX ORDER — MORPHINE SULFATE 2 MG/ML
2 INJECTION, SOLUTION INTRAMUSCULAR; INTRAVENOUS EVERY 4 HOURS PRN
Status: DISCONTINUED | OUTPATIENT
Start: 2021-03-05 | End: 2021-03-08 | Stop reason: HOSPADM

## 2021-03-05 RX ORDER — HYDROCODONE BITARTRATE AND ACETAMINOPHEN 5; 325 MG/1; MG/1
1 TABLET ORAL EVERY 6 HOURS PRN
Status: DISCONTINUED | OUTPATIENT
Start: 2021-03-05 | End: 2021-03-06

## 2021-03-05 RX ORDER — NALOXONE HCL 0.4 MG/ML
0.4 VIAL (ML) INJECTION
Status: DISCONTINUED | OUTPATIENT
Start: 2021-03-05 | End: 2021-03-08 | Stop reason: HOSPADM

## 2021-03-05 RX ADMIN — MORPHINE SULFATE 4 MG: 4 INJECTION INTRAVENOUS at 01:49

## 2021-03-05 RX ADMIN — HYDROCODONE BITARTRATE AND ACETAMINOPHEN 1 TABLET: 5; 325 TABLET ORAL at 12:22

## 2021-03-05 RX ADMIN — SODIUM CHLORIDE 75 ML/HR: 9 INJECTION, SOLUTION INTRAVENOUS at 05:11

## 2021-03-05 RX ADMIN — VALSARTAN 320 MG: 160 TABLET, FILM COATED ORAL at 07:48

## 2021-03-05 RX ADMIN — HEPARIN SODIUM 5000 UNITS: 5000 INJECTION INTRAVENOUS; SUBCUTANEOUS at 05:09

## 2021-03-05 RX ADMIN — HEPARIN SODIUM 5000 UNITS: 5000 INJECTION INTRAVENOUS; SUBCUTANEOUS at 20:29

## 2021-03-05 RX ADMIN — MORPHINE SULFATE 2 MG: 2 INJECTION, SOLUTION INTRAMUSCULAR; INTRAVENOUS at 13:46

## 2021-03-05 RX ADMIN — VANCOMYCIN HYDROCHLORIDE 1000 MG: 1 INJECTION, POWDER, LYOPHILIZED, FOR SOLUTION INTRAVENOUS at 05:09

## 2021-03-05 RX ADMIN — PANTOPRAZOLE SODIUM 40 MG: 40 TABLET, DELAYED RELEASE ORAL at 05:09

## 2021-03-05 RX ADMIN — HEPARIN SODIUM 5000 UNITS: 5000 INJECTION INTRAVENOUS; SUBCUTANEOUS at 12:22

## 2021-03-05 RX ADMIN — MORPHINE SULFATE 4 MG: 4 INJECTION INTRAVENOUS at 06:10

## 2021-03-05 RX ADMIN — HYDROCODONE BITARTRATE AND ACETAMINOPHEN 1 TABLET: 5; 325 TABLET ORAL at 18:13

## 2021-03-05 RX ADMIN — HYDROCODONE BITARTRATE AND ACETAMINOPHEN 1 TABLET: 10; 325 TABLET ORAL at 07:48

## 2021-03-05 RX ADMIN — MORPHINE SULFATE 2 MG: 2 INJECTION, SOLUTION INTRAMUSCULAR; INTRAVENOUS at 20:30

## 2021-03-05 RX ADMIN — VANCOMYCIN HYDROCHLORIDE 1000 MG: 1 INJECTION, POWDER, LYOPHILIZED, FOR SOLUTION INTRAVENOUS at 17:24

## 2021-03-05 NOTE — PROGRESS NOTES
TWO PATIENT IDENTIFIERS WERE USED. CONSENT WAS SIGNED PER PATIENT EDUCATION MATERIAL WAS GIVEN TO PATIENT AND / OR FAMILY. THE PATIENT WAS DRAPED WITH FULL BODY DRAPE AND PATIENT'S RIGHT ARM WAS PREPPED WITH CHLORAPREP.  ULTRASOUND WAS USED TO LOCALIZE THERIGHT BASILIC  VEIN. SUBCUTANEOUS TISSUE AT THE CATHETER SITE WAS INFILTRATED WITH 2% LIDOCAINE. UNDER ULTRASOUND GUIDANCE, THE VEIN WAS ACCESSED WITH A 21GAUGE  NEEDLE. AN 0.018 WIRE WAS THEN THREADED THROUGH THE NEEDLE INTO THE CENTRAL VENOUS SYSTEM. THE 21GAUGE  NEEDLE WAS REMOVED AND A 4 Danish PEEL AWAY SHEATH WAS PLACED OVER THE WIRE. THE PICC LINE CATHETER WAS CUT AT 38 CM. THE PICC LINE CATHETER WAS THEN PLACED OVER THE WIRE INTO THE VEIN, THE SHEATH WAS PEELED AWAY,WIRE WAS REMOVED. CATHETER WAS FLUSHED WITH NORMAL SALINE AND TIPS APPLIED. BIOPATCH PLACED. CATHETER SECURED WITH STATLOCK AND TEGADERM. PATIENT TOLERATED PROCEDURE WELL. THIS WAS DONE IN THE   ANGIOSUITE      IMPRESSION: SUCCESSFUL PLACEMENT OF SINGLE LUMEN SOLO PICC        Giselle Lyons  3/5/2021  13:20 CST

## 2021-03-05 NOTE — DISCHARGE PLACEMENT REQUEST
"  Possible discharge over the weekend with home IV abx. Wound culture is not finalized at this time so I do not have an order. Just wanted to give a heads up.  Thank you    Zenaida Li (55 y.o. Female)     Date of Birth Social Security Number Address Home Phone MRN    1965  [id number] [address] [phone] [id number]    Latter day Marital Status          Confucianist        Admission Date Admission Type Admitting Provider Attending Provider Department, Room/Bed    3/2/21 Emergency Milad Castro, Milad Workman,  [facility] 3 EAST, 375/1    Discharge Date Discharge Disposition Discharge Destination                       Attending Provider: Milad Castro DO    Allergies: Amoxicillin-pot Clavulanate, Clarithromycin, Ibuprofen, Naproxen, Nsaids, Other    Isolation: None   Infection: None   Code Status: CPR    Ht: 152.4 cm (60\")   Wt: 127 kg (280 lb)    Admission Cmt: None   Principal Problem: None                Active Insurance as of 3/2/2021     Primary Coverage     Payor Plan Insurance Group Employer/Plan Group    MEDICARE MEDICARE A & B      Payor Plan Address Payor Plan Phone Number Payor Plan Fax Number Effective Dates    [address] [phone]  3/1/2014 - None Entered    [place] [address]       Subscriber Name Subscriber Birth Date Member ID       ZENAIDA LI 1965 [id number]                 Emergency Contacts      (Rel.) Home Phone Work Phone Mobile Phone    Gigi Li (Spouse) [phone] -- [phone]            Insurance Information                MEDICARE/MEDICARE A & B Phone: [phone]    Subscriber: Zenaida Li Subscriber#: [id number]    Group#:  Precert#:           "

## 2021-03-05 NOTE — PLAN OF CARE
Goal Outcome Evaluation:     Progress: improving  Outcome Summary: pt. VSS. LUIS 1L picc placed. pain controlled with PO and IV pain medicaiton. will cont to monitor

## 2021-03-05 NOTE — PROGRESS NOTES
[facility] Medicine Services  INPATIENT PROGRESS NOTE    Length of Stay: 1  Date of Admission: 3/2/2021  Primary Care Physician: Asif Lin MD    Subjective   Chief Complaint: left foot cellulitis  HPI:  54 yo CF admitted for worsening symptoms at the base of the left great toe    Patient was seen before the surgery.  Continues to have pain.  Denies any other symptoms    Review of Systems   Respiratory: Negative for shortness of breath.    Cardiovascular: Negative for chest pain.   Gastrointestinal: Negative for abdominal pain.   Musculoskeletal: Positive for arthralgias.        All pertinent negatives and positives are as above. All other systems have been reviewed and are negative unless otherwise stated.     Objective    Temp:  [96.2 °F (35.7 °C)-97.7 °F (36.5 °C)] 97.2 °F (36.2 °C)  Heart Rate:  [] 85  Resp:  [16-20] 18  BP: (139-165)/(70-97) 147/97    Physical Exam  Constitutional:       Appearance: She is well-developed.   HENT:      Head: Normocephalic and atraumatic.   Eyes:      Pupils: Pupils are equal, round, and reactive to light.   Neck:      Musculoskeletal: Normal range of motion.   Cardiovascular:      Rate and Rhythm: Normal rate.   Pulmonary:      Breath sounds: Decreased breath sounds present. No wheezing.   Abdominal:      Palpations: Abdomen is soft.      Tenderness: There is no abdominal tenderness.   Skin:     General: Skin is warm and dry.      Comments: Left foot covered in dressing   Neurological:      Mental Status: She is alert.             Results Review:  I have reviewed the labs, radiology results, and diagnostic studies.    Laboratory Data:   Results from last 7 days   Lab Units 03/04/21  0632 03/03/21  0558 03/02/21  1213   SODIUM mmol/L 139 138 135*   POTASSIUM mmol/L 3.8 3.7 4.1   CHLORIDE mmol/L 102 102 101   CO2 mmol/L 28.0 29.0 27.0   BUN mg/dL 8 8 9   CREATININE mg/dL 0.55* 0.66 0.66   GLUCOSE mg/dL 109* 110* 107*   CALCIUM  mg/dL 9.3 8.7 9.5   BILIRUBIN mg/dL  --   --  0.3   ALK PHOS U/L  --   --  113   ALT (SGPT) U/L  --   --  10   AST (SGOT) U/L  --   --  17   ANION GAP mmol/L 9.0 7.0 7.0     Estimated Creatinine Clearance: 137.9 mL/min (A) (by C-G formula based on SCr of 0.55 mg/dL (L)).          Results from last 7 days   Lab Units 03/04/21  0632 03/03/21  0558 03/02/21  1213   WBC 10*3/mm3 7.87 7.46 9.80   HEMOGLOBIN g/dL 12.2 12.3 13.0   HEMATOCRIT % 36.7 38.5 39.1   PLATELETS 10*3/mm3 173 203 237           Culture Data:   Blood Culture   Date Value Ref Range Status   03/03/2021 No growth at 24 hours  Preliminary   03/02/2021 No growth at 2 days  Preliminary     No results found for: URINECX  No results found for: RESPCX  No results found for: WOUNDCX  No results found for: STOOLCX  No components found for: BODYFLD    Radiology Data:   Imaging Results (Last 24 Hours)     ** No results found for the last 24 hours. **          I have reviewed the patient's current medications.     Assessment/Plan     Active Hospital Problems    Diagnosis   • Osteomyelitis of great toe of left foot (CMS/HCC)       Plan:    1.  Cellulitis/possible osteomyelitis of left great toe: worse.  Status post surgery.  Continue vancomycin.  Monitor  2.  Hypertension: stable.  Cont current treatment.  Monitor         Discharge Planning: I expect patient to be discharged to home in 1-2 days.    Signed     Dr Milad Castro,    3/4/2021  20:46 CST

## 2021-03-05 NOTE — PLAN OF CARE
Goal Outcome Evaluation:     Progress: improving  Outcome Summary: VSS; pain controlled with meds; resting inbetween care; will continue to monitor

## 2021-03-05 NOTE — PROGRESS NOTES
Podiatry/Surgery Progress Note    S: Feeling well. NAD.    O:  Vitals:    03/05/21 1455   BP: 122/66   Pulse: 81   Resp: 18   Temp: 98.2 °F (36.8 °C)   SpO2: 95%       Physical Exam   Constitutional: she appears well-developed and well-nourished.   HEENT: Normocephalic. Atraumatic  CV: No tenderness. RRR  Resp: Non-labored respiration. No wheezes.   Psychiatric: she has a normal mood and affect. her   behavior is normal.      Lower Extremity Exam:  Left foot dressing c/d/i    WBC   Date Value Ref Range Status   03/05/2021 7.00 3.40 - 10.80 10*3/mm3 Final     RBC   Date Value Ref Range Status   03/05/2021 4.15 3.77 - 5.28 10*6/mm3 Final     Hemoglobin   Date Value Ref Range Status   03/05/2021 11.5 (L) 12.0 - 15.9 g/dL Final     Hematocrit   Date Value Ref Range Status   03/05/2021 35.4 34.0 - 46.6 % Final     MCV   Date Value Ref Range Status   03/05/2021 85.3 79.0 - 97.0 fL Final     MCH   Date Value Ref Range Status   03/05/2021 27.7 26.6 - 33.0 pg Final     MCHC   Date Value Ref Range Status   03/05/2021 32.5 31.5 - 35.7 g/dL Final     RDW   Date Value Ref Range Status   03/05/2021 13.6 12.3 - 15.4 % Final     RDW-SD   Date Value Ref Range Status   03/05/2021 42.3 37.0 - 54.0 fl Final     MPV   Date Value Ref Range Status   03/05/2021 9.3 6.0 - 12.0 fL Final     Platelets   Date Value Ref Range Status   03/05/2021 162 140 - 450 10*3/mm3 Final     Neutrophil %   Date Value Ref Range Status   03/05/2021 69.4 42.7 - 76.0 % Final     Lymphocyte %   Date Value Ref Range Status   03/05/2021 21.6 19.6 - 45.3 % Final     Monocyte %   Date Value Ref Range Status   03/05/2021 6.4 5.0 - 12.0 % Final     Eosinophil %   Date Value Ref Range Status   03/05/2021 1.0 0.3 - 6.2 % Final     Basophil %   Date Value Ref Range Status   03/05/2021 0.6 0.0 - 1.5 % Final     Immature Grans %   Date Value Ref Range Status   03/05/2021 1.0 (H) 0.0 - 0.5 % Final     Neutrophils, Absolute   Date Value Ref Range Status   03/05/2021 4.86 1.70  - 7.00 10*3/mm3 Final     Lymphocytes, Absolute   Date Value Ref Range Status   03/05/2021 1.51 0.70 - 3.10 10*3/mm3 Final     Monocytes, Absolute   Date Value Ref Range Status   03/05/2021 0.45 0.10 - 0.90 10*3/mm3 Final     Eosinophils, Absolute   Date Value Ref Range Status   03/05/2021 0.07 0.00 - 0.40 10*3/mm3 Final     Basophils, Absolute   Date Value Ref Range Status   03/05/2021 0.04 0.00 - 0.20 10*3/mm3 Final     Immature Grans, Absolute   Date Value Ref Range Status   03/05/2021 0.07 (H) 0.00 - 0.05 10*3/mm3 Final     nRBC   Date Value Ref Range Status   03/05/2021 0.0 0.0 - 0.2 /100 WBC Final           A/P:  1. Left foot abscess  2. DM with peripheral neuropathy    Doing well postoperatively. Intraop cx growing mrsa. Continue vancomycin. Awaiting bone pathology for antibiotic duration.          This document has been electronically signed by Gamaliel Starks DPM on March 5, 2021 15:46 CST

## 2021-03-05 NOTE — PROGRESS NOTES
Discharge Planning Assessment  [facility]     Patient Name: Haven Mahoney  MRN: [id number]  Today's Date: 3/5/2021    Admit Date: 3/2/2021    Discharge Needs Assessment     Row Name 03/05/21 1216       Living Environment    Lives With  spouse    Current Living Arrangements  home/apartment/condo    Primary Care Provided by  self    Provides Primary Care For  no one    Family Caregiver if Needed  child(anay), adult;spouse    Quality of Family Relationships  helpful;involved;supportive    Able to Return to Prior Arrangements  yes    Living Arrangement Comments  Pt resides at home with spouse and 23 year old son. Pt voiced good support system.       Resource/Environmental Concerns    Resource/Environmental Concerns  none    Transportation Concerns  car, none       Transition Planning    Patient/Family Anticipates Transition to  home with family    Patient/Family Anticipated Services at Transition  none LSW offered home health. Pt refused.    Transportation Anticipated  family or friend will provide       Discharge Needs Assessment    Equipment Currently Used at Home  none Pt has access to walker.    Concerns to be Addressed  denies needs/concerns at this time    Equipment Needed After Discharge  -- Awaiting therapy recomendations. Pt did not anticipate any DME needs at this time.    Discharge Facility/Level of Care Needs  -- Pt refused home health follow up.    Current Discharge Risk  physical impairment        Discharge Plan     Row Name 03/05/21 1219       Plan    Plan Comments  LSW assessment complete. Pt resides at home with spouse and 23 year old son. Pt voiced good support system. Pt reports being independent prior to hospitalization. Pt was not using home health or DME prior to admission. Pt has access to walker. Pt plans to return home at d/c and did not anticipate any needs. LSW offered home health, pt refused. LSW awaiting MD and therapy recomendations. LSW/case mgt will follow up as consulted and  cmplete arrangements as ordered. Colton COBOS        Continued Care and Services - Admitted Since 3/2/2021    Coordination has not been started for this encounter.       Expected Discharge Date and Time     Expected Discharge Date Expected Discharge Time    Mar 8, 2021         Demographic Summary     Row Name 03/05/21 1214       General Information    Admission Type  inpatient    Referral Source  high risk screening    Reason for Consult  discharge planning    Preferred Language  English     Used During This Interaction  no       Contact Information    Contact Information Obtained for          Functional Status     Row Name 03/05/21 1214       Functional Status    Usual Activity Tolerance  moderate    Current Activity Tolerance  fair    Functional Status Comments  Pt has access to walker. Was not using one prior to hospitalization.       Functional Status, IADL    Medications  independent Pt uses [facility]. Pt reports having prescription coverage.    Meal Preparation  independent    Housekeeping  independent    Laundry  independent    Shopping  independent       Mental Status Summary    Recent Changes in Mental Status/Cognitive Functioning  no changes        Psychosocial    No documentation.       Abuse/Neglect    No documentation.       Legal    No documentation.       Substance Abuse    No documentation.       Patient Forms    No documentation.           PAPITO Flores

## 2021-03-05 NOTE — PROGRESS NOTES
"  Pharmacokinetics by Pharmacy - Vancomycin    Haven Mahoney is a 55 y.o. female  [Ht: 152.4 cm (60\"); Wt: 127 kg (280 lb)]    Estimated Creatinine Clearance: 122.9 mL/min (by C-G formula based on SCr of 0.63 mg/dL).   Creatinine   Date Value Ref Range Status   03/05/2021 0.63 0.57 - 1.00 mg/dL Final   03/04/2021 0.55 (L) 0.57 - 1.00 mg/dL Final   03/03/2021 0.66 0.57 - 1.00 mg/dL Final      Lab Results   Component Value Date    WBC 7.00 03/05/2021    WBC 7.87 03/04/2021    WBC 7.46 03/03/2021     C-Reactive Protein   Date Value Ref Range Status   03/03/2021 1.70 (H) 0.00 - 0.50 mg/dL Final     Lactate   Date Value Ref Range Status   03/17/2020 1.5 0.5 - 2.0 mmol/L Final       Temp Readings from Last 1 Encounters:   03/05/21 96.3 °F (35.7 °C) (Oral)      Lab Results   Component Value Date    VANCOPEAK 42.50 (C) 03/04/2021    VANCOTROUGH 28.30 (C) 03/04/2021         Culture Results:  Microbiology Results (last 10 days)       Procedure Component Value - Date/Time    Wound Culture - Wound, Toe, Left [[id number]] Collected: 03/04/21 1212    Lab Status: Preliminary result Specimen: Wound from Toe, Left Updated: 03/04/21 1422     Gram Stain Rare (1+) WBCs seen      Rare (1+) Gram positive cocci    Blood Culture - Blood, Hand, Right [[id number]] Collected: 03/03/21 0804    Lab Status: Preliminary result Specimen: Blood from Hand, Right Updated: 03/05/21 0830     Blood Culture No growth at 2 days    Blood Culture - Blood, Arm, Right [[id number]] Collected: 03/02/21 1408    Lab Status: Preliminary result Specimen: Blood from Arm, Right Updated: 03/04/21 1415     Blood Culture No growth at 2 days    COVID-19 and FLU A/B PCR - Swab, Nasopharynx [[id number]]  (Normal) Collected: 03/02/21 1402    Lab Status: Final result Specimen: Swab from Nasopharynx Updated: 03/02/21 1432     COVID19 Not Detected     Influenza A PCR Not Detected     Influenza B PCR Not Detected    Narrative:      Fact sheet for providers: " https://www.fda.gov/media/456681/download    Fact sheet for patients: https://www.fda.gov/media/959888/download    Test performed by PCR.          No results found for: RESPCX    Indication for use: osteomyelitis Left great toe      Current Vancomycin Dose: 1000 mg IVPB every 12 hours, day 4 of therapy.    Assessment/Plan:  Reviewed above labs and cultures. MRI confirms osteomyelitis. Ordered levels to check next peak and trough. WBC is 7. Cr is 0.63. Will continue current dose.  Pharmacy will continue to monitor renal function and adjust dose accordingly.    Gema Vila, PharmD   03/05/21 08:39 CST

## 2021-03-06 LAB
ANION GAP SERPL CALCULATED.3IONS-SCNC: 8 MMOL/L (ref 5–15)
BACTERIA SPEC AEROBE CULT: ABNORMAL
BASOPHILS # BLD AUTO: 0.03 10*3/MM3 (ref 0–0.2)
BASOPHILS NFR BLD AUTO: 0.4 % (ref 0–1.5)
BUN SERPL-MCNC: 9 MG/DL (ref 6–20)
BUN/CREAT SERPL: 14.8 (ref 7–25)
CALCIUM SPEC-SCNC: 9.5 MG/DL (ref 8.6–10.5)
CHLORIDE SERPL-SCNC: 103 MMOL/L (ref 98–107)
CO2 SERPL-SCNC: 29 MMOL/L (ref 22–29)
CREAT SERPL-MCNC: 0.61 MG/DL (ref 0.57–1)
DEPRECATED RDW RBC AUTO: 40 FL (ref 37–54)
EOSINOPHIL # BLD AUTO: 0.08 10*3/MM3 (ref 0–0.4)
EOSINOPHIL NFR BLD AUTO: 1.1 % (ref 0.3–6.2)
ERYTHROCYTE [DISTWIDTH] IN BLOOD BY AUTOMATED COUNT: 13.3 % (ref 12.3–15.4)
GFR SERPL CREATININE-BSD FRML MDRD: 101 ML/MIN/1.73
GLUCOSE SERPL-MCNC: 106 MG/DL (ref 65–99)
GRAM STN SPEC: ABNORMAL
GRAM STN SPEC: ABNORMAL
HCT VFR BLD AUTO: 36.2 % (ref 34–46.6)
HGB BLD-MCNC: 12 G/DL (ref 12–15.9)
IMM GRANULOCYTES # BLD AUTO: 0.08 10*3/MM3 (ref 0–0.05)
IMM GRANULOCYTES NFR BLD AUTO: 1.1 % (ref 0–0.5)
LYMPHOCYTES # BLD AUTO: 1.37 10*3/MM3 (ref 0.7–3.1)
LYMPHOCYTES NFR BLD AUTO: 19.4 % (ref 19.6–45.3)
MCH RBC QN AUTO: 27.6 PG (ref 26.6–33)
MCHC RBC AUTO-ENTMCNC: 33.1 G/DL (ref 31.5–35.7)
MCV RBC AUTO: 83.4 FL (ref 79–97)
MONOCYTES # BLD AUTO: 0.37 10*3/MM3 (ref 0.1–0.9)
MONOCYTES NFR BLD AUTO: 5.2 % (ref 5–12)
NEUTROPHILS NFR BLD AUTO: 5.14 10*3/MM3 (ref 1.7–7)
NEUTROPHILS NFR BLD AUTO: 72.8 % (ref 42.7–76)
NRBC BLD AUTO-RTO: 0 /100 WBC (ref 0–0.2)
PLATELET # BLD AUTO: 154 10*3/MM3 (ref 140–450)
PMV BLD AUTO: 9.8 FL (ref 6–12)
POTASSIUM SERPL-SCNC: 3.8 MMOL/L (ref 3.5–5.2)
RBC # BLD AUTO: 4.34 10*6/MM3 (ref 3.77–5.28)
SODIUM SERPL-SCNC: 140 MMOL/L (ref 136–145)
VANCOMYCIN TROUGH SERPL-MCNC: 12.8 MCG/ML (ref 5–20)
WBC # BLD AUTO: 7.07 10*3/MM3 (ref 3.4–10.8)

## 2021-03-06 PROCEDURE — 25010000002 VANCOMYCIN 1 G RECONSTITUTED SOLUTION: Performed by: PODIATRIST

## 2021-03-06 PROCEDURE — 93010 ELECTROCARDIOGRAM REPORT: CPT | Performed by: INTERNAL MEDICINE

## 2021-03-06 PROCEDURE — 93005 ELECTROCARDIOGRAM TRACING: CPT | Performed by: HOSPITALIST

## 2021-03-06 PROCEDURE — 85025 COMPLETE CBC W/AUTO DIFF WBC: CPT | Performed by: PODIATRIST

## 2021-03-06 PROCEDURE — 99024 POSTOP FOLLOW-UP VISIT: CPT | Performed by: PODIATRIST

## 2021-03-06 PROCEDURE — 80048 BASIC METABOLIC PNL TOTAL CA: CPT | Performed by: PODIATRIST

## 2021-03-06 PROCEDURE — 25010000002 MORPHINE PER 10 MG: Performed by: HOSPITALIST

## 2021-03-06 PROCEDURE — 80202 ASSAY OF VANCOMYCIN: CPT | Performed by: PODIATRIST

## 2021-03-06 PROCEDURE — 25010000002 HEPARIN (PORCINE) PER 1000 UNITS: Performed by: PODIATRIST

## 2021-03-06 RX ORDER — HYDROCODONE BITARTRATE AND ACETAMINOPHEN 10; 325 MG/1; MG/1
1 TABLET ORAL EVERY 6 HOURS PRN
Status: DISCONTINUED | OUTPATIENT
Start: 2021-03-06 | End: 2021-03-06

## 2021-03-06 RX ORDER — HYDROCODONE BITARTRATE AND ACETAMINOPHEN 10; 325 MG/1; MG/1
1 TABLET ORAL EVERY 4 HOURS PRN
Status: DISCONTINUED | OUTPATIENT
Start: 2021-03-06 | End: 2021-03-08 | Stop reason: HOSPADM

## 2021-03-06 RX ADMIN — HEPARIN SODIUM 5000 UNITS: 5000 INJECTION INTRAVENOUS; SUBCUTANEOUS at 19:11

## 2021-03-06 RX ADMIN — HEPARIN SODIUM 5000 UNITS: 5000 INJECTION INTRAVENOUS; SUBCUTANEOUS at 06:17

## 2021-03-06 RX ADMIN — HYDROCODONE BITARTRATE AND ACETAMINOPHEN 1 TABLET: 10; 325 TABLET ORAL at 15:05

## 2021-03-06 RX ADMIN — VANCOMYCIN HYDROCHLORIDE 1000 MG: 1 INJECTION, POWDER, LYOPHILIZED, FOR SOLUTION INTRAVENOUS at 07:01

## 2021-03-06 RX ADMIN — HEPARIN SODIUM 5000 UNITS: 5000 INJECTION INTRAVENOUS; SUBCUTANEOUS at 14:29

## 2021-03-06 RX ADMIN — HYDROCODONE BITARTRATE AND ACETAMINOPHEN 1 TABLET: 10; 325 TABLET ORAL at 19:11

## 2021-03-06 RX ADMIN — MORPHINE SULFATE 2 MG: 2 INJECTION, SOLUTION INTRAMUSCULAR; INTRAVENOUS at 12:39

## 2021-03-06 RX ADMIN — HYDROCODONE BITARTRATE AND ACETAMINOPHEN 1 TABLET: 5; 325 TABLET ORAL at 06:18

## 2021-03-06 RX ADMIN — PANTOPRAZOLE SODIUM 40 MG: 40 TABLET, DELAYED RELEASE ORAL at 06:18

## 2021-03-06 RX ADMIN — MORPHINE SULFATE 2 MG: 2 INJECTION, SOLUTION INTRAMUSCULAR; INTRAVENOUS at 08:12

## 2021-03-06 RX ADMIN — HYDROCODONE BITARTRATE AND ACETAMINOPHEN 1 TABLET: 5; 325 TABLET ORAL at 00:20

## 2021-03-06 RX ADMIN — MORPHINE SULFATE 2 MG: 2 INJECTION, SOLUTION INTRAMUSCULAR; INTRAVENOUS at 03:41

## 2021-03-06 RX ADMIN — VANCOMYCIN HYDROCHLORIDE 1000 MG: 1 INJECTION, POWDER, LYOPHILIZED, FOR SOLUTION INTRAVENOUS at 17:48

## 2021-03-06 RX ADMIN — VALSARTAN 320 MG: 160 TABLET, FILM COATED ORAL at 08:14

## 2021-03-06 RX ADMIN — SODIUM CHLORIDE, PRESERVATIVE FREE 10 ML: 5 INJECTION INTRAVENOUS at 08:12

## 2021-03-06 RX ADMIN — SODIUM CHLORIDE 75 ML/HR: 9 INJECTION, SOLUTION INTRAVENOUS at 03:01

## 2021-03-06 RX ADMIN — HYDROCODONE BITARTRATE AND ACETAMINOPHEN 1 TABLET: 10; 325 TABLET ORAL at 10:07

## 2021-03-06 NOTE — PLAN OF CARE
Goal Outcome Evaluation:  Plan of Care Reviewed With: patient  Progress: improving  Outcome Summary: vss, po pain pill increased, no distress. wound cx pos for MRSA, pt educated

## 2021-03-06 NOTE — PROGRESS NOTES
[facility] Medicine Services  INPATIENT PROGRESS NOTE    Length of Stay: 3  Date of Admission: 3/2/2021  Primary Care Physician: Asif Lin MD    Subjective   Chief Complaint: left foot cellulitis  HPI:  56 yo CF admitted for worsening symptoms at the base of the left great toe    Pain is much improved today.  Cultures growing MRSA.  Blood cultures pending.  Denies any other symptoms.    Review of Systems   Respiratory: Negative for shortness of breath.    Cardiovascular: Negative for chest pain.   Gastrointestinal: Negative for abdominal pain.   Musculoskeletal: Positive for arthralgias.        All pertinent negatives and positives are as above. All other systems have been reviewed and are negative unless otherwise stated.     Objective    Temp:  [97.3 °F (36.3 °C)-98.1 °F (36.7 °C)] 97.6 °F (36.4 °C)  Heart Rate:  [74-85] 85  Resp:  [18] 18  BP: (136-166)/(83-88) 136/87    Physical Exam  Constitutional:       Appearance: She is well-developed.   HENT:      Head: Normocephalic and atraumatic.   Eyes:      Pupils: Pupils are equal, round, and reactive to light.   Cardiovascular:      Rate and Rhythm: Normal rate.   Pulmonary:      Breath sounds: Decreased breath sounds present. No wheezing.   Abdominal:      Palpations: Abdomen is soft.      Tenderness: There is no abdominal tenderness.   Musculoskeletal:      Cervical back: Normal range of motion.      Comments: Left foot covered in dressing.  Sensory/motor intact   Skin:     General: Skin is warm and dry.   Neurological:      Mental Status: She is alert.             Results Review:  I have reviewed the labs, radiology results, and diagnostic studies.    Laboratory Data:   Results from last 7 days   Lab Units 03/06/21  0517 03/05/21  0617 03/04/21  0632 03/02/21  1213   SODIUM mmol/L 140 140 139 135*   POTASSIUM mmol/L 3.8 3.8 3.8 4.1   CHLORIDE mmol/L 103 103 102 101   CO2 mmol/L 29.0 29.0 28.0 27.0   BUN mg/dL 9 8 8  9   CREATININE mg/dL 0.61 0.63 0.55* 0.66   GLUCOSE mg/dL 106* 96 109* 107*   CALCIUM mg/dL 9.5 8.6 9.3 9.5   BILIRUBIN mg/dL  --   --   --  0.3   ALK PHOS U/L  --   --   --  113   ALT (SGPT) U/L  --   --   --  10   AST (SGOT) U/L  --   --   --  17   ANION GAP mmol/L 8.0 8.0 9.0 7.0     Estimated Creatinine Clearance: 127 mL/min (by C-G formula based on SCr of 0.61 mg/dL).          Results from last 7 days   Lab Units 03/06/21  0517 03/05/21  0617 03/04/21  0632 03/03/21  0558 03/02/21  1213   WBC 10*3/mm3 7.07 7.00 7.87 7.46 9.80   HEMOGLOBIN g/dL 12.0 11.5* 12.2 12.3 13.0   HEMATOCRIT % 36.2 35.4 36.7 38.5 39.1   PLATELETS 10*3/mm3 154 162 173 203 237           Culture Data:   No results found for: BLOODCX  No results found for: URINECX  No results found for: RESPCX  Wound Culture   Date Value Ref Range Status   03/04/2021 Scant growth (1+) Staphylococcus aureus, MRSA (A)  Final     Comment:     Methicillin resistant Staphylococcus aureus, Patient may be an isolation risk.     No results found for: STOOLCX  No components found for: BODYFLD    Radiology Data:   Imaging Results (Last 24 Hours)     ** No results found for the last 24 hours. **          I have reviewed the patient's current medications.     Assessment/Plan     Active Hospital Problems    Diagnosis    • Osteomyelitis of great toe of left foot (CMS/[facility])        Plan:    1.  Osteomyelitis of great toe of left foot: Improving.  Status post incision and drainage/bone biopsy.  Continue vancomycin.  Wound cultures pending.  Wound cultures growing MRSA.  Continue pain control.  Patient has PICC line.  Podiatry following.  Monitor  2. Hypertension: Stable.  Continue current treatment.  Monitor        Discharge Planning: I expect patient to be discharged to home in 1-2 days.    Signed     Dr Milad Castro DO   3/6/2021  15:25 CST

## 2021-03-06 NOTE — PROGRESS NOTES
[facility] Medicine Services  INPATIENT PROGRESS NOTE    Length of Stay: 3  Date of Admission: 3/2/2021  Primary Care Physician: Asif Lin MD    Subjective   Chief Complaint: left foot cellulitis  HPI:  54 yo CF admitted for worsening symptoms at the base of the left great toe      Patient was seen on 3/5/2021 - pain much better today.  Waiting on cultures to grow.  Denies any other symptoms.    Review of Systems   Respiratory: Negative for shortness of breath.    Cardiovascular: Negative for chest pain.   Gastrointestinal: Negative for abdominal pain.   Musculoskeletal: Positive for arthralgias.        All pertinent negatives and positives are as above. All other systems have been reviewed and are negative unless otherwise stated.     Objective    Temp:  [97.3 °F (36.3 °C)-98.1 °F (36.7 °C)] 97.6 °F (36.4 °C)  Heart Rate:  [74-85] 85  Resp:  [18] 18  BP: (136-166)/(83-88) 136/87    Physical Exam  Constitutional:       Appearance: She is well-developed.   HENT:      Head: Normocephalic and atraumatic.   Eyes:      Pupils: Pupils are equal, round, and reactive to light.   Cardiovascular:      Rate and Rhythm: Normal rate.   Pulmonary:      Breath sounds: Decreased breath sounds present. No wheezing.   Abdominal:      Palpations: Abdomen is soft.      Tenderness: There is no abdominal tenderness.   Musculoskeletal:      Cervical back: Normal range of motion.      Comments: Left foot covered in dressing   Skin:     General: Skin is warm and dry.   Neurological:      Mental Status: She is alert.             Results Review:  I have reviewed the labs, radiology results, and diagnostic studies.    Laboratory Data:   Results from last 7 days   Lab Units 03/06/21  0517 03/05/21  0617 03/04/21  0632 03/02/21  1213   SODIUM mmol/L 140 140 139 135*   POTASSIUM mmol/L 3.8 3.8 3.8 4.1   CHLORIDE mmol/L 103 103 102 101   CO2 mmol/L 29.0 29.0 28.0 27.0   BUN mg/dL 9 8 8 9   CREATININE  mg/dL 0.61 0.63 0.55* 0.66   GLUCOSE mg/dL 106* 96 109* 107*   CALCIUM mg/dL 9.5 8.6 9.3 9.5   BILIRUBIN mg/dL  --   --   --  0.3   ALK PHOS U/L  --   --   --  113   ALT (SGPT) U/L  --   --   --  10   AST (SGOT) U/L  --   --   --  17   ANION GAP mmol/L 8.0 8.0 9.0 7.0     Estimated Creatinine Clearance: 127 mL/min (by C-G formula based on SCr of 0.61 mg/dL).          Results from last 7 days   Lab Units 03/06/21  0517 03/05/21  0617 03/04/21  0632 03/03/21  0558 03/02/21  1213   WBC 10*3/mm3 7.07 7.00 7.87 7.46 9.80   HEMOGLOBIN g/dL 12.0 11.5* 12.2 12.3 13.0   HEMATOCRIT % 36.2 35.4 36.7 38.5 39.1   PLATELETS 10*3/mm3 154 162 173 203 237           Culture Data:   No results found for: BLOODCX  No results found for: URINECX  No results found for: RESPCX  Wound Culture   Date Value Ref Range Status   03/04/2021 Scant growth (1+) Staphylococcus aureus, MRSA (A)  Final     Comment:     Methicillin resistant Staphylococcus aureus, Patient may be an isolation risk.     No results found for: STOOLCX  No components found for: BODYFLD    Radiology Data:   Imaging Results (Last 24 Hours)     ** No results found for the last 24 hours. **          I have reviewed the patient's current medications.     Assessment/Plan     Active Hospital Problems    Diagnosis    • Osteomyelitis of great toe of left foot (CMS/[facility])        Plan:    1.  Cellulitis/possible osteomyelitis of left great toe: improved.  Status post surgery.  Cultures from the surgery pending.  Continue vancomycin.  Monitor  2.  Hypertension: stable.  Cont current treatment.  Monitor         Discharge Planning: I expect patient to be discharged to home in 1-2 days.    Signed     Dr Milad Castro,    3/6/2021  15:19 CST

## 2021-03-06 NOTE — OP NOTE
SURGEON: Gamaliel Starks DPM     ASSISTANT: Venecia Duran     PREOPERATIVE DIAGNOSES:   1. Left foot cellulitis, abscess.   2. Osteomyelitis left foot        POSTOPERATIVE DIAGNOSES:  1. Left foot cellulitis, abscess.   2. Osteomyelitis left foot        PROCEDURE PERFORMED:   1. Incision and drainage, left foot  2.  Left first metatarsal bone biopsy     ANESTHESIA: General.      HEMOSTASIS: Left ankle pneumatic tourniquet inflated to 250 mmHg for approximately 20 minutes.      ESTIMATED BLOOD LOSS: 10 mL.     MATERIALS: None.      INJECTABLES: 15 mL 0.5% Marcaine plain.      SPECIMEN: Left first metatarsal bone biopsy     COMPLICATIONS: None.      INDICATIONS:  This is a current inpatient treated for recurrent left foot cellulitis.  Approximate 1 month prior patient underwent incision and drainage with tibial sesamoidectomy for acute osteomyelitis.  She had been progressing quite well however has noticed increased swelling redness and drainage over the past week from the surgical site.  She presents today for additional debridement.  All risks, benefits and potential complications In detail.      DESCRIPTION OF PROCEDURE:  Under mild sedation, the patient was brought in the operating room and placed on the operating table in supine position. Following induction of general anesthesia, the left foot and ankle were prepped and draped in the usual aseptic fashion.  Attention was then drawn to  a medial first metatarsophalangeal joint draining sinus that measured approximately 0.5 cm in diameter. Two longitudinal curvilinear incisions were carried out surrounding this sinus and this wound tissue was excised down to the level of the joint capsule. During this dissection interval there was noted to be a small subcutaneous abscess, which was cultured. All adjacent nonviable tissue was excisionally debrided and passed off the surgical field.  There is no obvious encroachment into the capsular tissue or extension deep  into the plantar foot. The incision was then irrigated with 3L sterile saline under gravity lavage.  A small incision into the medial capsule was then performed and a Jamshidi needle was utilized to take 2 samples of the first metatarsal head for pathological evaluation. During closure, the tourniquet was deflated and assured only healthy bleeding tissue remained. A dry sterile dressing was applied. The patient was taken from the operating room to the recovery room with vital signs stable and neurovascular status intact.  She will return to the hospital floor for continued IV antibiotics and await bone pathology results.

## 2021-03-06 NOTE — PROGRESS NOTES
Podiatry/Surgery Progress Note    S: Feeling well. NAD.    O:  Vitals:    03/06/21 1107   BP: 136/87   Pulse: 85   Resp: 18   Temp: 97.6 °F (36.4 °C)   SpO2: 96%       Physical Exam   Constitutional: she appears well-developed and well-nourished.   HEENT: Normocephalic. Atraumatic  CV: No tenderness. RRR  Resp: Non-labored respiration. No wheezes.   Psychiatric: she has a normal mood and affect. her   behavior is normal.      Lower Extremity Exam:  Left foot dressing c/d/i  Medial first MTPJ's swelling, erythema improving.  No active drainage noted.    WBC   Date Value Ref Range Status   03/06/2021 7.07 3.40 - 10.80 10*3/mm3 Final     RBC   Date Value Ref Range Status   03/06/2021 4.34 3.77 - 5.28 10*6/mm3 Final     Hemoglobin   Date Value Ref Range Status   03/06/2021 12.0 12.0 - 15.9 g/dL Final     Hematocrit   Date Value Ref Range Status   03/06/2021 36.2 34.0 - 46.6 % Final     MCV   Date Value Ref Range Status   03/06/2021 83.4 79.0 - 97.0 fL Final     MCH   Date Value Ref Range Status   03/06/2021 27.6 26.6 - 33.0 pg Final     MCHC   Date Value Ref Range Status   03/06/2021 33.1 31.5 - 35.7 g/dL Final     RDW   Date Value Ref Range Status   03/06/2021 13.3 12.3 - 15.4 % Final     RDW-SD   Date Value Ref Range Status   03/06/2021 40.0 37.0 - 54.0 fl Final     MPV   Date Value Ref Range Status   03/06/2021 9.8 6.0 - 12.0 fL Final     Platelets   Date Value Ref Range Status   03/06/2021 154 140 - 450 10*3/mm3 Final     Neutrophil %   Date Value Ref Range Status   03/06/2021 72.8 42.7 - 76.0 % Final     Lymphocyte %   Date Value Ref Range Status   03/06/2021 19.4 (L) 19.6 - 45.3 % Final     Monocyte %   Date Value Ref Range Status   03/06/2021 5.2 5.0 - 12.0 % Final     Eosinophil %   Date Value Ref Range Status   03/06/2021 1.1 0.3 - 6.2 % Final     Basophil %   Date Value Ref Range Status   03/06/2021 0.4 0.0 - 1.5 % Final     Immature Grans %   Date Value Ref Range Status   03/06/2021 1.1 (H) 0.0 - 0.5 % Final      Neutrophils, Absolute   Date Value Ref Range Status   03/06/2021 5.14 1.70 - 7.00 10*3/mm3 Final     Lymphocytes, Absolute   Date Value Ref Range Status   03/06/2021 1.37 0.70 - 3.10 10*3/mm3 Final     Monocytes, Absolute   Date Value Ref Range Status   03/06/2021 0.37 0.10 - 0.90 10*3/mm3 Final     Eosinophils, Absolute   Date Value Ref Range Status   03/06/2021 0.08 0.00 - 0.40 10*3/mm3 Final     Basophils, Absolute   Date Value Ref Range Status   03/06/2021 0.03 0.00 - 0.20 10*3/mm3 Final     Immature Grans, Absolute   Date Value Ref Range Status   03/06/2021 0.08 (H) 0.00 - 0.05 10*3/mm3 Final     nRBC   Date Value Ref Range Status   03/06/2021 0.0 0.0 - 0.2 /100 WBC Final           A/P:  1. Left foot abscess, s/p incision and drainage.  Postoperative day 2  2. DM with peripheral neuropathy    Doing well postoperatively. Intraop cx growing mrsa. Continue vancomycin. Awaiting bone pathology for antibiotic duration.  Dressing change at bedside today.  Erythema, edema improving.  Will trend ESR/CRP in a.m.          This document has been electronically signed by Gamaliel Starks DPM on March 6, 2021 14:50 CST

## 2021-03-06 NOTE — PLAN OF CARE
Goal Outcome Evaluation:  Plan of Care Reviewed With: patient  Progress: no change  Outcome Summary: VSS. Pt continues frequent requests of pain meds.

## 2021-03-07 ENCOUNTER — APPOINTMENT (OUTPATIENT)
Dept: GENERAL RADIOLOGY | Facility: HOSPITAL | Age: 56
End: 2021-03-07

## 2021-03-07 LAB
ANION GAP SERPL CALCULATED.3IONS-SCNC: 9 MMOL/L (ref 5–15)
BACTERIA SPEC AEROBE CULT: NORMAL
BASOPHILS # BLD AUTO: 0.04 10*3/MM3 (ref 0–0.2)
BASOPHILS NFR BLD AUTO: 0.6 % (ref 0–1.5)
BUN SERPL-MCNC: 9 MG/DL (ref 6–20)
BUN/CREAT SERPL: 14.3 (ref 7–25)
CALCIUM SPEC-SCNC: 9.4 MG/DL (ref 8.6–10.5)
CHLORIDE SERPL-SCNC: 102 MMOL/L (ref 98–107)
CO2 SERPL-SCNC: 28 MMOL/L (ref 22–29)
CREAT SERPL-MCNC: 0.63 MG/DL (ref 0.57–1)
CRP SERPL-MCNC: 1.64 MG/DL (ref 0–0.5)
DEPRECATED RDW RBC AUTO: 41.1 FL (ref 37–54)
EOSINOPHIL # BLD AUTO: 0.09 10*3/MM3 (ref 0–0.4)
EOSINOPHIL NFR BLD AUTO: 1.3 % (ref 0.3–6.2)
ERYTHROCYTE [DISTWIDTH] IN BLOOD BY AUTOMATED COUNT: 13.6 % (ref 12.3–15.4)
ERYTHROCYTE [SEDIMENTATION RATE] IN BLOOD: 41 MM/HR (ref 0–20)
GFR SERPL CREATININE-BSD FRML MDRD: 98 ML/MIN/1.73
GLUCOSE SERPL-MCNC: 105 MG/DL (ref 65–99)
HCT VFR BLD AUTO: 37.7 % (ref 34–46.6)
HGB BLD-MCNC: 12.3 G/DL (ref 12–15.9)
IMM GRANULOCYTES # BLD AUTO: 0.1 10*3/MM3 (ref 0–0.05)
IMM GRANULOCYTES NFR BLD AUTO: 1.4 % (ref 0–0.5)
LYMPHOCYTES # BLD AUTO: 1.41 10*3/MM3 (ref 0.7–3.1)
LYMPHOCYTES NFR BLD AUTO: 20.1 % (ref 19.6–45.3)
MCH RBC QN AUTO: 27.5 PG (ref 26.6–33)
MCHC RBC AUTO-ENTMCNC: 32.6 G/DL (ref 31.5–35.7)
MCV RBC AUTO: 84.2 FL (ref 79–97)
MONOCYTES # BLD AUTO: 0.44 10*3/MM3 (ref 0.1–0.9)
MONOCYTES NFR BLD AUTO: 6.3 % (ref 5–12)
NEUTROPHILS NFR BLD AUTO: 4.92 10*3/MM3 (ref 1.7–7)
NEUTROPHILS NFR BLD AUTO: 70.3 % (ref 42.7–76)
NRBC BLD AUTO-RTO: 0 /100 WBC (ref 0–0.2)
PLATELET # BLD AUTO: 159 10*3/MM3 (ref 140–450)
PMV BLD AUTO: 9.8 FL (ref 6–12)
POTASSIUM SERPL-SCNC: 3.5 MMOL/L (ref 3.5–5.2)
RBC # BLD AUTO: 4.48 10*6/MM3 (ref 3.77–5.28)
SODIUM SERPL-SCNC: 139 MMOL/L (ref 136–145)
WBC # BLD AUTO: 7 10*3/MM3 (ref 3.4–10.8)

## 2021-03-07 PROCEDURE — 25010000002 VANCOMYCIN 1 G RECONSTITUTED SOLUTION: Performed by: PODIATRIST

## 2021-03-07 PROCEDURE — 85025 COMPLETE CBC W/AUTO DIFF WBC: CPT | Performed by: PODIATRIST

## 2021-03-07 PROCEDURE — 25010000002 MORPHINE PER 10 MG: Performed by: HOSPITALIST

## 2021-03-07 PROCEDURE — 25010000002 HYDRALAZINE PER 20 MG: Performed by: PODIATRIST

## 2021-03-07 PROCEDURE — 25010000002 ONDANSETRON PER 1 MG: Performed by: PODIATRIST

## 2021-03-07 PROCEDURE — 74018 RADEX ABDOMEN 1 VIEW: CPT

## 2021-03-07 PROCEDURE — 25010000002 PROMETHAZINE PER 50 MG: Performed by: FAMILY MEDICINE

## 2021-03-07 PROCEDURE — 25010000002 HEPARIN (PORCINE) PER 1000 UNITS: Performed by: PODIATRIST

## 2021-03-07 PROCEDURE — 86140 C-REACTIVE PROTEIN: CPT | Performed by: PODIATRIST

## 2021-03-07 PROCEDURE — 85651 RBC SED RATE NONAUTOMATED: CPT | Performed by: PODIATRIST

## 2021-03-07 PROCEDURE — 80048 BASIC METABOLIC PNL TOTAL CA: CPT | Performed by: PODIATRIST

## 2021-03-07 PROCEDURE — 25010000002 DEXAMETHASONE PER 1 MG: Performed by: FAMILY MEDICINE

## 2021-03-07 RX ORDER — DEXAMETHASONE SODIUM PHOSPHATE 10 MG/ML
10 INJECTION INTRAMUSCULAR; INTRAVENOUS ONCE
Status: COMPLETED | OUTPATIENT
Start: 2021-03-07 | End: 2021-03-07

## 2021-03-07 RX ORDER — SUMATRIPTAN 6 MG/.5ML
6 INJECTION, SOLUTION SUBCUTANEOUS ONCE
Status: COMPLETED | OUTPATIENT
Start: 2021-03-07 | End: 2021-03-07

## 2021-03-07 RX ORDER — ALUMINA, MAGNESIA, AND SIMETHICONE 2400; 2400; 240 MG/30ML; MG/30ML; MG/30ML
15 SUSPENSION ORAL ONCE
Status: COMPLETED | OUTPATIENT
Start: 2021-03-07 | End: 2021-03-07

## 2021-03-07 RX ORDER — LIDOCAINE HYDROCHLORIDE 20 MG/ML
15 SOLUTION OROPHARYNGEAL ONCE
Status: COMPLETED | OUTPATIENT
Start: 2021-03-07 | End: 2021-03-07

## 2021-03-07 RX ADMIN — SODIUM CHLORIDE 75 ML/HR: 9 INJECTION, SOLUTION INTRAVENOUS at 00:38

## 2021-03-07 RX ADMIN — DEXAMETHASONE SODIUM PHOSPHATE 10 MG: 10 INJECTION INTRAMUSCULAR; INTRAVENOUS at 20:05

## 2021-03-07 RX ADMIN — MORPHINE SULFATE 2 MG: 2 INJECTION, SOLUTION INTRAMUSCULAR; INTRAVENOUS at 10:25

## 2021-03-07 RX ADMIN — ALUMINUM HYDROXIDE, MAGNESIUM HYDROXIDE, AND DIMETHICONE 15 ML: 400; 400; 40 SUSPENSION ORAL at 17:42

## 2021-03-07 RX ADMIN — SODIUM CHLORIDE, PRESERVATIVE FREE 10 ML: 5 INJECTION INTRAVENOUS at 07:54

## 2021-03-07 RX ADMIN — VALSARTAN 320 MG: 160 TABLET, FILM COATED ORAL at 07:53

## 2021-03-07 RX ADMIN — HYDROCODONE BITARTRATE AND ACETAMINOPHEN 1 TABLET: 10; 325 TABLET ORAL at 00:35

## 2021-03-07 RX ADMIN — HYDRALAZINE HYDROCHLORIDE 10 MG: 20 INJECTION INTRAMUSCULAR; INTRAVENOUS at 16:12

## 2021-03-07 RX ADMIN — LIDOCAINE HYDROCHLORIDE 15 ML: 20 SOLUTION ORAL; TOPICAL at 17:42

## 2021-03-07 RX ADMIN — HEPARIN SODIUM 5000 UNITS: 5000 INJECTION INTRAVENOUS; SUBCUTANEOUS at 19:42

## 2021-03-07 RX ADMIN — HYDROCODONE BITARTRATE AND ACETAMINOPHEN 1 TABLET: 10; 325 TABLET ORAL at 05:41

## 2021-03-07 RX ADMIN — HEPARIN SODIUM 5000 UNITS: 5000 INJECTION INTRAVENOUS; SUBCUTANEOUS at 14:32

## 2021-03-07 RX ADMIN — VANCOMYCIN HYDROCHLORIDE 1000 MG: 1 INJECTION, POWDER, LYOPHILIZED, FOR SOLUTION INTRAVENOUS at 05:40

## 2021-03-07 RX ADMIN — HEPARIN SODIUM 5000 UNITS: 5000 INJECTION INTRAVENOUS; SUBCUTANEOUS at 05:40

## 2021-03-07 RX ADMIN — HYDROCODONE BITARTRATE AND ACETAMINOPHEN 1 TABLET: 10; 325 TABLET ORAL at 09:04

## 2021-03-07 RX ADMIN — ONDANSETRON 4 MG: 2 INJECTION INTRAMUSCULAR; INTRAVENOUS at 15:38

## 2021-03-07 RX ADMIN — SUMATRIPTAN 6 MG: 6 INJECTION SUBCUTANEOUS at 20:05

## 2021-03-07 RX ADMIN — HYDROCODONE BITARTRATE AND ACETAMINOPHEN 1 TABLET: 10; 325 TABLET ORAL at 21:11

## 2021-03-07 RX ADMIN — PANTOPRAZOLE SODIUM 40 MG: 40 TABLET, DELAYED RELEASE ORAL at 05:41

## 2021-03-07 RX ADMIN — HYDROCODONE BITARTRATE AND ACETAMINOPHEN 1 TABLET: 10; 325 TABLET ORAL at 13:21

## 2021-03-07 RX ADMIN — MORPHINE SULFATE 2 MG: 2 INJECTION, SOLUTION INTRAMUSCULAR; INTRAVENOUS at 18:25

## 2021-03-07 RX ADMIN — PROMETHAZINE HYDROCHLORIDE 12.5 MG: 25 INJECTION INTRAMUSCULAR; INTRAVENOUS at 19:42

## 2021-03-07 RX ADMIN — VANCOMYCIN HYDROCHLORIDE 1000 MG: 1 INJECTION, POWDER, LYOPHILIZED, FOR SOLUTION INTRAVENOUS at 16:19

## 2021-03-07 RX ADMIN — HYDRALAZINE HYDROCHLORIDE 10 MG: 20 INJECTION INTRAMUSCULAR; INTRAVENOUS at 09:08

## 2021-03-07 NOTE — NURSING NOTE
Pt vomited x 2, no abdominal pain, no nausea until she is already vomiting. After she vomits, she no longer feels nauseas. Zofran given. Dr Suazo notified and will continue to monitor.

## 2021-03-07 NOTE — PLAN OF CARE
Goal Outcome Evaluation:  Plan of Care Reviewed With: patient  Progress: no change  Outcome Summary: VSS. Pain well managed. Rested well.

## 2021-03-07 NOTE — PROGRESS NOTES
"  Pharmacokinetics by Pharmacy - Vancomycin    Haven Mahoney is a 55 y.o. female  [Ht: 152.4 cm (60\"); Wt: 127 kg (280 lb)]    Estimated Creatinine Clearance: 122.9 mL/min (by C-G formula based on SCr of 0.63 mg/dL).   Creatinine   Date Value Ref Range Status   03/07/2021 0.63 0.57 - 1.00 mg/dL Final   03/06/2021 0.61 0.57 - 1.00 mg/dL Final   03/05/2021 0.63 0.57 - 1.00 mg/dL Final      Lab Results   Component Value Date    WBC 7.00 03/07/2021    WBC 7.07 03/06/2021    WBC 7.00 03/05/2021     C-Reactive Protein   Date Value Ref Range Status   03/07/2021 1.64 (H) 0.00 - 0.50 mg/dL Final   03/03/2021 1.70 (H) 0.00 - 0.50 mg/dL Final     Lactate   Date Value Ref Range Status   03/17/2020 1.5 0.5 - 2.0 mmol/L Final       Temp Readings from Last 1 Encounters:   03/07/21 96.9 °F (36.1 °C) (Oral)      Lab Results   Component Value Date    VANCOPEAK 24.60 03/05/2021    VANCOTROUGH 12.80 03/06/2021         Culture Results:  Microbiology Results (last 10 days)       Procedure Component Value - Date/Time    Wound Culture - Wound, Toe, Left [[id number]]  (Abnormal)  (Susceptibility) Collected: 03/04/21 1212    Lab Status: Final result Specimen: Wound from Toe, Left Updated: 03/06/21 0910     Wound Culture Scant growth (1+) Staphylococcus aureus, MRSA     Comment: Methicillin resistant Staphylococcus aureus, Patient may be an isolation risk.        Gram Stain Rare (1+) WBCs seen      Rare (1+) Gram positive cocci    Susceptibility        Staphylococcus aureus, MRSA      ROCHELLE      Clindamycin Susceptible      Erythromycin Resistant      Inducible Clindamycin Resistance Negative      Oxacillin Resistant      Penicillin G Resistant      Rifampin Susceptible      Tetracycline Resistant      Trimethoprim + Sulfamethoxazole Susceptible      Vancomycin Susceptible                 Linear View                   Susceptibility Comments       Staphylococcus aureus, MRSA    This isolate does not demonstrate inducible clindamycin " resistance in vitro.                 Blood Culture - Blood, Hand, Right [[id number]] Collected: 03/03/21 0804    Lab Status: Preliminary result Specimen: Blood from Hand, Right Updated: 03/07/21 0830     Blood Culture No growth at 4 days    Blood Culture - Blood, Arm, Right [[id number]] Collected: 03/02/21 1408    Lab Status: Preliminary result Specimen: Blood from Arm, Right Updated: 03/06/21 1415     Blood Culture No growth at 4 days    COVID-19 and FLU A/B PCR - Swab, Nasopharynx [[id number]]  (Normal) Collected: 03/02/21 1402    Lab Status: Final result Specimen: Swab from Nasopharynx Updated: 03/02/21 1432     COVID19 Not Detected     Influenza A PCR Not Detected     Influenza B PCR Not Detected    Narrative:      Fact sheet for providers: https://www.fda.gov/media/077725/download    Fact sheet for patients: https://www.fda.gov/media/092110/download    Test performed by PCR.          No results found for: RESPCX    Indication for use: osteomyelitis Left great toe      Current Vancomycin Dose:  1000 mg IVPB every 12 hours, day 6 of therapy.      Assessment/Plan:  Reviewed above labs and cultures. Results above.  Vancomycin levels are at goal. WBC is 7, in goal range. Cr is 0.63, stable. Will continue current dose.  Pharmacy will continue to monitor renal function and adjust dose accordingly.    eGma Vila, PharmD   03/07/21 09:32 CST

## 2021-03-07 NOTE — PROGRESS NOTES
[facility] Medicine Services  INPATIENT PROGRESS NOTE    Length of Stay: 4  Date of Admission: 3/2/2021  Primary Care Physician: Asif Lin MD    Subjective   Chief Complaint: left foot cellulitis  HPI:    POD#3, I/D of left foot abscess.  Pain is controlled.  She denies chest pain, shortness of breath, abdominal pain, nausea, vomiting, fevers, chills.      Review of Systems   Comprehensive review of systems completed.  Pertinent positives and negatives per HPI.  Positive history negative.    Objective    Temp:  [96.2 °F (35.7 °C)-97.8 °F (36.6 °C)] 96.2 °F (35.7 °C)  Heart Rate:  [] 97  Resp:  [18-19] 18  BP: (142-180)/(71-91) 154/71    Physical Exam  Constitutional:       Appearance: She is well-developed.   HENT:      Head: Normocephalic and atraumatic.      Right Ear: External ear normal.      Left Ear: External ear normal.      Mouth/Throat:      Mouth: Mucous membranes are moist.      Pharynx: Oropharynx is clear.   Eyes:      General: No scleral icterus.     Pupils: Pupils are equal, round, and reactive to light.   Cardiovascular:      Rate and Rhythm: Normal rate and regular rhythm.      Heart sounds: Normal heart sounds.   Pulmonary:      Effort: Pulmonary effort is normal.      Breath sounds: No wheezing, rhonchi or rales.   Abdominal:      Palpations: Abdomen is soft.      Tenderness: There is no abdominal tenderness.   Musculoskeletal:      Cervical back: Normal range of motion. No muscular tenderness.      Right lower leg: No edema.      Comments: Left foot covered in dressing.  Sensory/motor intact   Lymphadenopathy:      Cervical: No cervical adenopathy.   Skin:     General: Skin is warm and dry.   Neurological:      General: No focal deficit present.      Mental Status: She is alert and oriented to person, place, and time.      Cranial Nerves: No cranial nerve deficit.      Sensory: No sensory deficit.      Motor: No weakness.      Coordination:  Coordination normal.   Psychiatric:         Mood and Affect: Mood normal.         Behavior: Behavior normal.             Results Review:  I have reviewed the labs, radiology results, and diagnostic studies.    Laboratory Data:   Results from last 7 days   Lab Units 03/07/21  0626 03/06/21 0517 03/05/21  0617 03/02/21  1213   SODIUM mmol/L 139 140 140 135*   POTASSIUM mmol/L 3.5 3.8 3.8 4.1   CHLORIDE mmol/L 102 103 103 101   CO2 mmol/L 28.0 29.0 29.0 27.0   BUN mg/dL 9 9 8 9   CREATININE mg/dL 0.63 0.61 0.63 0.66   GLUCOSE mg/dL 105* 106* 96 107*   CALCIUM mg/dL 9.4 9.5 8.6 9.5   BILIRUBIN mg/dL  --   --   --  0.3   ALK PHOS U/L  --   --   --  113   ALT (SGPT) U/L  --   --   --  10   AST (SGOT) U/L  --   --   --  17   ANION GAP mmol/L 9.0 8.0 8.0 7.0     Estimated Creatinine Clearance: 122.9 mL/min (by C-G formula based on SCr of 0.63 mg/dL).          Results from last 7 days   Lab Units 03/07/21  0626 03/06/21 0517 03/05/21 0617 03/04/21  0632 03/03/21  0558   WBC 10*3/mm3 7.00 7.07 7.00 7.87 7.46   HEMOGLOBIN g/dL 12.3 12.0 11.5* 12.2 12.3   HEMATOCRIT % 37.7 36.2 35.4 36.7 38.5   PLATELETS 10*3/mm3 159 154 162 173 203           Culture Data:   No results found for: BLOODCX  No results found for: URINECX  No results found for: RESPCX  No results found for: WOUNDCX  No results found for: STOOLCX  No components found for: BODYFLD    Radiology Data:   Imaging Results (Last 24 Hours)     ** No results found for the last 24 hours. **          I have reviewed the patient's current medications.     Assessment/Plan     Active Hospital Problems    Diagnosis    • Osteomyelitis of great toe of left foot (CMS/HCC)        Plan:    1.  Osteomyelitis of great toe of left foot/left foot abscess:  POD#3.  Status post incision and drainage/bone biopsy.  Continue vancomycin.  Wound cultures growing MRSA.  Continue pain control.  Patient has PICC line.  Podiatry following and recs appreciated.      2. Hypertension: Stable.   Continue current treatment.  Monitor        Discharge Planning: I expect patient to be discharged to home in 2-3 days.     Signed     Dr Patricio Suazo MD   3/7/2021  13:22 CST

## 2021-03-07 NOTE — NURSING NOTE
Pt vomiting again, Dr Suazo updated, gi cocktail ordered. Was given cocktail and immediately started vomiting again. Dr Suazo again notified, stat KUB ordered.

## 2021-03-08 VITALS
DIASTOLIC BLOOD PRESSURE: 77 MMHG | HEIGHT: 60 IN | RESPIRATION RATE: 18 BRPM | TEMPERATURE: 96.3 F | OXYGEN SATURATION: 96 % | WEIGHT: 280 LBS | BODY MASS INDEX: 54.97 KG/M2 | HEART RATE: 77 BPM | SYSTOLIC BLOOD PRESSURE: 153 MMHG

## 2021-03-08 LAB
ANION GAP SERPL CALCULATED.3IONS-SCNC: 8 MMOL/L (ref 5–15)
BACTERIA SPEC AEROBE CULT: NORMAL
BASOPHILS # BLD AUTO: 0.02 10*3/MM3 (ref 0–0.2)
BASOPHILS NFR BLD AUTO: 0.3 % (ref 0–1.5)
BUN SERPL-MCNC: 10 MG/DL (ref 6–20)
BUN/CREAT SERPL: 18.2 (ref 7–25)
CALCIUM SPEC-SCNC: 9.7 MG/DL (ref 8.6–10.5)
CHLORIDE SERPL-SCNC: 103 MMOL/L (ref 98–107)
CO2 SERPL-SCNC: 26 MMOL/L (ref 22–29)
CREAT SERPL-MCNC: 0.55 MG/DL (ref 0.57–1)
CRP SERPL-MCNC: 2.27 MG/DL (ref 0–0.5)
DEPRECATED RDW RBC AUTO: 40.5 FL (ref 37–54)
EOSINOPHIL # BLD AUTO: 0 10*3/MM3 (ref 0–0.4)
EOSINOPHIL NFR BLD AUTO: 0 % (ref 0.3–6.2)
ERYTHROCYTE [DISTWIDTH] IN BLOOD BY AUTOMATED COUNT: 13.5 % (ref 12.3–15.4)
ERYTHROCYTE [SEDIMENTATION RATE] IN BLOOD: 48 MM/HR (ref 0–20)
GFR SERPL CREATININE-BSD FRML MDRD: 114 ML/MIN/1.73
GLUCOSE SERPL-MCNC: 156 MG/DL (ref 65–99)
HCT VFR BLD AUTO: 37.3 % (ref 34–46.6)
HGB BLD-MCNC: 12.4 G/DL (ref 12–15.9)
IMM GRANULOCYTES # BLD AUTO: 0.12 10*3/MM3 (ref 0–0.05)
IMM GRANULOCYTES NFR BLD AUTO: 1.6 % (ref 0–0.5)
LAB AP CASE REPORT: NORMAL
LYMPHOCYTES # BLD AUTO: 0.72 10*3/MM3 (ref 0.7–3.1)
LYMPHOCYTES NFR BLD AUTO: 9.7 % (ref 19.6–45.3)
MCH RBC QN AUTO: 27.5 PG (ref 26.6–33)
MCHC RBC AUTO-ENTMCNC: 33.2 G/DL (ref 31.5–35.7)
MCV RBC AUTO: 82.7 FL (ref 79–97)
MONOCYTES # BLD AUTO: 0.1 10*3/MM3 (ref 0.1–0.9)
MONOCYTES NFR BLD AUTO: 1.3 % (ref 5–12)
NEUTROPHILS NFR BLD AUTO: 6.47 10*3/MM3 (ref 1.7–7)
NEUTROPHILS NFR BLD AUTO: 87.1 % (ref 42.7–76)
NRBC BLD AUTO-RTO: 0 /100 WBC (ref 0–0.2)
PATH REPORT.FINAL DX SPEC: NORMAL
PLATELET # BLD AUTO: 191 10*3/MM3 (ref 140–450)
PMV BLD AUTO: 10 FL (ref 6–12)
POTASSIUM SERPL-SCNC: 3.9 MMOL/L (ref 3.5–5.2)
RBC # BLD AUTO: 4.51 10*6/MM3 (ref 3.77–5.28)
SODIUM SERPL-SCNC: 137 MMOL/L (ref 136–145)
WBC # BLD AUTO: 7.43 10*3/MM3 (ref 3.4–10.8)

## 2021-03-08 PROCEDURE — 25010000002 VANCOMYCIN 1 G RECONSTITUTED SOLUTION: Performed by: PODIATRIST

## 2021-03-08 PROCEDURE — 25010000002 HEPARIN (PORCINE) PER 1000 UNITS: Performed by: PODIATRIST

## 2021-03-08 PROCEDURE — 86140 C-REACTIVE PROTEIN: CPT | Performed by: PODIATRIST

## 2021-03-08 PROCEDURE — 85651 RBC SED RATE NONAUTOMATED: CPT | Performed by: PODIATRIST

## 2021-03-08 PROCEDURE — 99024 POSTOP FOLLOW-UP VISIT: CPT | Performed by: PODIATRIST

## 2021-03-08 PROCEDURE — 85025 COMPLETE CBC W/AUTO DIFF WBC: CPT | Performed by: PODIATRIST

## 2021-03-08 PROCEDURE — 80048 BASIC METABOLIC PNL TOTAL CA: CPT | Performed by: PODIATRIST

## 2021-03-08 RX ORDER — SULFAMETHOXAZOLE AND TRIMETHOPRIM 800; 160 MG/1; MG/1
1 TABLET ORAL 2 TIMES DAILY
Qty: 6 TABLET | Refills: 0 | Status: SHIPPED | OUTPATIENT
Start: 2021-03-08 | End: 2021-03-11

## 2021-03-08 RX ORDER — HYDROCODONE BITARTRATE AND ACETAMINOPHEN 10; 325 MG/1; MG/1
1 TABLET ORAL EVERY 8 HOURS PRN
Qty: 9 TABLET | Refills: 0 | Status: SHIPPED | OUTPATIENT
Start: 2021-03-08 | End: 2021-03-11

## 2021-03-08 RX ADMIN — HYDROCODONE BITARTRATE AND ACETAMINOPHEN 1 TABLET: 10; 325 TABLET ORAL at 02:52

## 2021-03-08 RX ADMIN — VALSARTAN 320 MG: 160 TABLET, FILM COATED ORAL at 08:24

## 2021-03-08 RX ADMIN — HYDROCODONE BITARTRATE AND ACETAMINOPHEN 1 TABLET: 10; 325 TABLET ORAL at 10:55

## 2021-03-08 RX ADMIN — SODIUM CHLORIDE 75 ML/HR: 9 INJECTION, SOLUTION INTRAVENOUS at 03:00

## 2021-03-08 RX ADMIN — HEPARIN SODIUM 5000 UNITS: 5000 INJECTION INTRAVENOUS; SUBCUTANEOUS at 05:41

## 2021-03-08 RX ADMIN — PANTOPRAZOLE SODIUM 40 MG: 40 TABLET, DELAYED RELEASE ORAL at 05:41

## 2021-03-08 RX ADMIN — HYDROCODONE BITARTRATE AND ACETAMINOPHEN 1 TABLET: 10; 325 TABLET ORAL at 06:49

## 2021-03-08 RX ADMIN — VANCOMYCIN HYDROCHLORIDE 1000 MG: 1 INJECTION, POWDER, LYOPHILIZED, FOR SOLUTION INTRAVENOUS at 05:41

## 2021-03-08 RX ADMIN — HYDROCODONE BITARTRATE AND ACETAMINOPHEN 1 TABLET: 10; 325 TABLET ORAL at 15:05

## 2021-03-08 NOTE — PROGRESS NOTES
Podiatry/Surgery Progress Note    S: Feeling well. NAD.    O:  Vitals:    03/08/21 0700   BP: 153/77   Pulse: 77   Resp: 18   Temp: 96.3 °F (35.7 °C)   SpO2: 96%       Physical Exam   Constitutional: she appears well-developed and well-nourished.   HEENT: Normocephalic. Atraumatic  CV: No tenderness. RRR  Resp: Non-labored respiration. No wheezes.   Psychiatric: she has a normal mood and affect. her   behavior is normal.      Lower Extremity Exam:  Left foot dressing c/d/i  Medial first MTPJ swelling, erythema improving.  No active drainage noted.    WBC   Date Value Ref Range Status   03/08/2021 7.43 3.40 - 10.80 10*3/mm3 Final     RBC   Date Value Ref Range Status   03/08/2021 4.51 3.77 - 5.28 10*6/mm3 Final     Hemoglobin   Date Value Ref Range Status   03/08/2021 12.4 12.0 - 15.9 g/dL Final     Hematocrit   Date Value Ref Range Status   03/08/2021 37.3 34.0 - 46.6 % Final     MCV   Date Value Ref Range Status   03/08/2021 82.7 79.0 - 97.0 fL Final     MCH   Date Value Ref Range Status   03/08/2021 27.5 26.6 - 33.0 pg Final     MCHC   Date Value Ref Range Status   03/08/2021 33.2 31.5 - 35.7 g/dL Final     RDW   Date Value Ref Range Status   03/08/2021 13.5 12.3 - 15.4 % Final     RDW-SD   Date Value Ref Range Status   03/08/2021 40.5 37.0 - 54.0 fl Final     MPV   Date Value Ref Range Status   03/08/2021 10.0 6.0 - 12.0 fL Final     Platelets   Date Value Ref Range Status   03/08/2021 191 140 - 450 10*3/mm3 Final     Neutrophil %   Date Value Ref Range Status   03/08/2021 87.1 (H) 42.7 - 76.0 % Final     Lymphocyte %   Date Value Ref Range Status   03/08/2021 9.7 (L) 19.6 - 45.3 % Final     Monocyte %   Date Value Ref Range Status   03/08/2021 1.3 (L) 5.0 - 12.0 % Final     Eosinophil %   Date Value Ref Range Status   03/08/2021 0.0 (L) 0.3 - 6.2 % Final     Basophil %   Date Value Ref Range Status   03/08/2021 0.3 0.0 - 1.5 % Final     Immature Grans %   Date Value Ref Range Status   03/08/2021 1.6 (H) 0.0 -  0.5 % Final     Neutrophils, Absolute   Date Value Ref Range Status   03/08/2021 6.47 1.70 - 7.00 10*3/mm3 Final     Lymphocytes, Absolute   Date Value Ref Range Status   03/08/2021 0.72 0.70 - 3.10 10*3/mm3 Final     Monocytes, Absolute   Date Value Ref Range Status   03/08/2021 0.10 0.10 - 0.90 10*3/mm3 Final     Eosinophils, Absolute   Date Value Ref Range Status   03/08/2021 0.00 0.00 - 0.40 10*3/mm3 Final     Basophils, Absolute   Date Value Ref Range Status   03/08/2021 0.02 0.00 - 0.20 10*3/mm3 Final     Immature Grans, Absolute   Date Value Ref Range Status   03/08/2021 0.12 (H) 0.00 - 0.05 10*3/mm3 Final     nRBC   Date Value Ref Range Status   03/08/2021 0.0 0.0 - 0.2 /100 WBC Final           A/P:  1. Left foot abscess, s/p incision and drainage.  Postoperative day 4  2. DM with peripheral neuropathy    Doing well postoperatively. Intraop cx growing mrsa. Bone pathology negative for osteomyelitis. OK to discharge home from my standpoint. Recommend discharge to home with 10 days bactrim. May leave dressing c/d/i until her 1st outpatient visit with me in one week.         This document has been electronically signed by Gamaliel Starks DPM on March 8, 2021 11:46 CST

## 2021-03-08 NOTE — PROGRESS NOTES
"  Pharmacokinetics by Pharmacy - Vancomycin    Haven Mahoney is a 55 y.o. female  [Ht: 152.4 cm (60\"); Wt: 127 kg (280 lb)]    Estimated Creatinine Clearance: 140.8 mL/min (A) (by C-G formula based on SCr of 0.55 mg/dL (L)).   Creatinine   Date Value Ref Range Status   03/08/2021 0.55 (L) 0.57 - 1.00 mg/dL Final   03/07/2021 0.63 0.57 - 1.00 mg/dL Final   03/06/2021 0.61 0.57 - 1.00 mg/dL Final      Lab Results   Component Value Date    WBC 7.43 03/08/2021    WBC 7.00 03/07/2021    WBC 7.07 03/06/2021     C-Reactive Protein   Date Value Ref Range Status   03/08/2021 2.27 (H) 0.00 - 0.50 mg/dL Final   03/07/2021 1.64 (H) 0.00 - 0.50 mg/dL Final   03/03/2021 1.70 (H) 0.00 - 0.50 mg/dL Final     Lactate   Date Value Ref Range Status   03/17/2020 1.5 0.5 - 2.0 mmol/L Final       Temp Readings from Last 1 Encounters:   03/08/21 96.3 °F (35.7 °C) (Axillary)      Lab Results   Component Value Date    VANCOPEAK 24.60 03/05/2021    VANCOTROUGH 12.80 03/06/2021         Culture Results:  Microbiology Results (last 10 days)       Procedure Component Value - Date/Time    Wound Culture - Wound, Toe, Left [[id number]]  (Abnormal)  (Susceptibility) Collected: 03/04/21 1212    Lab Status: Final result Specimen: Wound from Toe, Left Updated: 03/06/21 0910     Wound Culture Scant growth (1+) Staphylococcus aureus, MRSA     Comment: Methicillin resistant Staphylococcus aureus, Patient may be an isolation risk.        Gram Stain Rare (1+) WBCs seen      Rare (1+) Gram positive cocci    Susceptibility        Staphylococcus aureus, MRSA      ROCHELLE      Clindamycin Susceptible      Erythromycin Resistant      Inducible Clindamycin Resistance Negative      Oxacillin Resistant      Penicillin G Resistant      Rifampin Susceptible      Tetracycline Resistant      Trimethoprim + Sulfamethoxazole Susceptible      Vancomycin Susceptible                 Linear View                   Susceptibility Comments       Staphylococcus aureus, MRSA    " This isolate does not demonstrate inducible clindamycin resistance in vitro.                 Blood Culture - Blood, Hand, Right [[id number]] Collected: 03/03/21 0804    Lab Status: Final result Specimen: Blood from Hand, Right Updated: 03/08/21 0830     Blood Culture No growth at 5 days    Blood Culture - Blood, Arm, Right [[id number]] Collected: 03/02/21 1408    Lab Status: Final result Specimen: Blood from Arm, Right Updated: 03/07/21 1415     Blood Culture No growth at 5 days    COVID-19 and FLU A/B PCR - Swab, Nasopharynx [[id number]]  (Normal) Collected: 03/02/21 1402    Lab Status: Final result Specimen: Swab from Nasopharynx Updated: 03/02/21 1432     COVID19 Not Detected     Influenza A PCR Not Detected     Influenza B PCR Not Detected    Narrative:      Fact sheet for providers: https://www.fda.gov/media/337917/download    Fact sheet for patients: https://www.fda.gov/media/143840/download    Test performed by PCR.          No results found for: RESPCX    Indication for use: osteomyelitis Left great toe    Current Vancomycin Dose:  1000 mg IVPB every 12 hours, day 7 of therapy.      Assessment/Plan:  Reviewed above labs and cultures. MRSA in wound culture.  Vancomycin consult is set to end tomorrow. Provider states not osteomyelitis and ok to discharge home on Bactrim. Vancomycin levels have been in goal range.  WBC is 7.43. Cr is 0.55. Will continue current dose.  Pharmacy will continue to monitor renal function and adjust dose accordingly if not discharged today.     Gema Vila, PharmD   03/08/21 09:36 CST

## 2021-03-08 NOTE — NURSING NOTE
Was notified by pharmacy that they could not send the phenergan dose as ordered by Dr Suazo.       Due to patient not meeting [facility] policy requirements for IV promethazine.

## 2021-03-08 NOTE — PLAN OF CARE
Goal Outcome Evaluation:  Plan of Care Reviewed With: patient  Progress: improving  Outcome Summary: Pt's vomiting resolved and she wants to have regular foods at lunch today. RD monitoring.

## 2021-03-08 NOTE — DISCHARGE SUMMARY
[facility] Medicine Services  DISCHARGE SUMMARY       Date of Admission: 3/2/2021  Date of Discharge:  3/8/2021  Primary Care Physician: Asif Lin MD    Presenting Problem/History of Present Illness:  Osteomyelitis of great toe of left foot (CMS/[facility]) [M86.9]  Osteomyelitis of great toe of left foot (CMS/[facility]) [M86.9]       Final Discharge Diagnoses:  Left foot abscess/cellulitis     s/p I&D day 4; wound secretion grew MRSA; has received 7 days of vancomycin; will send her home with 3 more days of bactrim; discussed with Dr Starks; midline would be discontinue before discharge    Diabetes neuropathy     Continue with home meds    Consults:   Consults     Date and Time Order Name Status Description    3/2/2021  2:53 PM Inpatient Podiatry Consult Completed            Procedures Performed: Procedure(s):  Left foot incision and drainage, bone biopsy                Pertinent Test Results:   Lab Results (most recent)     Procedure Component Value Units Date/Time    Sedimentation Rate [[id number]]  (Abnormal) Collected: 03/08/21 0552    Specimen: Blood Updated: 03/08/21 1058     Sed Rate 48 mm/hr     Tissue Pathology Exam [[id number]] Collected: 03/04/21 1209    Specimen: Bone from Toe, Left Updated: 03/08/21 1043     Case Report --     Surgical Pathology Report                         Case: [id number]                                  Authorizing Provider:  Gamaliel Starks DPM    Collected:           03/04/2021 12:09 PM          Ordering Location:     [facility]             Received:            03/04/2021 01:29 PM                                 [place]                                                              Pathologist:           Rita Carrero MD                                                        Specimen:    Toe, Left, bone biopsy left first metatarsal                                                Final Diagnosis --     See Scanned Report         C-reactive Protein [[id number]]  (Abnormal) Collected: 03/08/21 0552    Specimen: Blood Updated: 03/08/21 0915     C-Reactive Protein 2.27 mg/dL     Blood Culture - Blood, Hand, Right [[id number]] Collected: 03/03/21 0804    Specimen: Blood from Hand, Right Updated: 03/08/21 0830     Blood Culture No growth at 5 days    Basic Metabolic Panel [[id number]]  (Abnormal) Collected: 03/08/21 0552    Specimen: Blood Updated: 03/08/21 0649     Glucose 156 mg/dL      BUN 10 mg/dL      Creatinine 0.55 mg/dL      Sodium 137 mmol/L      Potassium 3.9 mmol/L      Chloride 103 mmol/L      CO2 26.0 mmol/L      Calcium 9.7 mg/dL      eGFR Non African Amer 114 mL/min/1.73      BUN/Creatinine Ratio 18.2     Anion Gap 8.0 mmol/L     Narrative:      GFR Normal >60  Chronic Kidney Disease <60  Kidney Failure <15      CBC & Differential [[id number]]  (Abnormal) Collected: 03/08/21 0552    Specimen: Blood Updated: 03/08/21 0618    Narrative:      The following orders were created for panel order CBC & Differential.  Procedure                               Abnormality         Status                     ---------                               -----------         ------                     CBC Auto Differential[[id number]]        Abnormal            Final result                 Please view results for these tests on the individual orders.    CBC Auto Differential [[id number]]  (Abnormal) Collected: 03/08/21 0552    Specimen: Blood Updated: 03/08/21 0618     WBC 7.43 10*3/mm3      RBC 4.51 10*6/mm3      Hemoglobin 12.4 g/dL      Hematocrit 37.3 %      MCV 82.7 fL      MCH 27.5 pg      MCHC 33.2 g/dL      RDW 13.5 %      RDW-SD 40.5 fl      MPV 10.0 fL      Platelets 191 10*3/mm3      Neutrophil % 87.1 %      Lymphocyte % 9.7 %      Monocyte % 1.3 %      Eosinophil % 0.0 %      Basophil % 0.3 %      Immature Grans % 1.6 %      Neutrophils, Absolute 6.47 10*3/mm3      Lymphocytes, Absolute 0.72 10*3/mm3      Monocytes, Absolute 0.10 10*3/mm3       Eosinophils, Absolute 0.00 10*3/mm3      Basophils, Absolute 0.02 10*3/mm3      Immature Grans, Absolute 0.12 10*3/mm3      nRBC 0.0 /100 WBC     Blood Culture - Blood, Arm, Right [[id number]] Collected: 03/02/21 1408    Specimen: Blood from Arm, Right Updated: 03/07/21 1415     Blood Culture No growth at 5 days    Sedimentation Rate [[id number]]  (Abnormal) Collected: 03/07/21 0626    Specimen: Blood Updated: 03/07/21 0813     Sed Rate 41 mm/hr     Basic Metabolic Panel [[id number]]  (Abnormal) Collected: 03/07/21 0626    Specimen: Blood Updated: 03/07/21 0729     Glucose 105 mg/dL      BUN 9 mg/dL      Creatinine 0.63 mg/dL      Sodium 139 mmol/L      Potassium 3.5 mmol/L      Chloride 102 mmol/L      CO2 28.0 mmol/L      Calcium 9.4 mg/dL      eGFR Non African Amer 98 mL/min/1.73      BUN/Creatinine Ratio 14.3     Anion Gap 9.0 mmol/L     Narrative:      GFR Normal >60  Chronic Kidney Disease <60  Kidney Failure <15      C-reactive Protein [[id number]]  (Abnormal) Collected: 03/07/21 0626    Specimen: Blood Updated: 03/07/21 0729     C-Reactive Protein 1.64 mg/dL     CBC & Differential [[id number]]  (Abnormal) Collected: 03/07/21 0626    Specimen: Blood Updated: 03/07/21 0701    Narrative:      The following orders were created for panel order CBC & Differential.  Procedure                               Abnormality         Status                     ---------                               -----------         ------                     CBC Auto Differential[[id number]]        Abnormal            Final result                 Please view results for these tests on the individual orders.    CBC Auto Differential [[id number]]  (Abnormal) Collected: 03/07/21 0626    Specimen: Blood Updated: 03/07/21 0701     WBC 7.00 10*3/mm3      RBC 4.48 10*6/mm3      Hemoglobin 12.3 g/dL      Hematocrit 37.7 %      MCV 84.2 fL      MCH 27.5 pg      MCHC 32.6 g/dL      RDW 13.6 %      RDW-SD 41.1 fl      MPV 9.8 fL      Platelets 159  10*3/mm3      Neutrophil % 70.3 %      Lymphocyte % 20.1 %      Monocyte % 6.3 %      Eosinophil % 1.3 %      Basophil % 0.6 %      Immature Grans % 1.4 %      Neutrophils, Absolute 4.92 10*3/mm3      Lymphocytes, Absolute 1.41 10*3/mm3      Monocytes, Absolute 0.44 10*3/mm3      Eosinophils, Absolute 0.09 10*3/mm3      Basophils, Absolute 0.04 10*3/mm3      Immature Grans, Absolute 0.10 10*3/mm3      nRBC 0.0 /100 WBC     Wound Culture - Wound, Toe, Left [[id number]]  (Abnormal)  (Susceptibility) Collected: 03/04/21 1212    Specimen: Wound from Toe, Left Updated: 03/06/21 0910     Wound Culture Scant growth (1+) Staphylococcus aureus, MRSA     Comment: Methicillin resistant Staphylococcus aureus, Patient may be an isolation risk.        Gram Stain Rare (1+) WBCs seen      Rare (1+) Gram positive cocci    Susceptibility      Staphylococcus aureus, MRSA      ROCHELLE      Clindamycin Susceptible      Erythromycin Resistant      Inducible Clindamycin Resistance Negative      Oxacillin Resistant      Penicillin G Resistant      Rifampin Susceptible      Tetracycline Resistant      Trimethoprim + Sulfamethoxazole Susceptible      Vancomycin Susceptible               Linear View               Susceptibility Comments     Staphylococcus aureus, MRSA    This isolate does not demonstrate inducible clindamycin resistance in vitro.               Vancomycin, Trough [[id number]]  (Normal) Collected: 03/06/21 0517    Specimen: Blood Updated: 03/06/21 0636     Vancomycin Trough 12.80 mcg/mL     Vancomycin, Peak [[id number]]  (Normal) Collected: 03/05/21 2016    Specimen: Blood Updated: 03/05/21 2049     Vancomycin Peak 24.60 mcg/mL     Vancomycin, Trough 30 minutes before vancomycin infusion please [[id number]]  (Abnormal) Collected: 03/04/21 0632    Specimen: Blood Updated: 03/04/21 0748     Vancomycin Trough 28.30 mcg/mL     Vancomycin, Peak 1 hour after vancomycin infusion please [[id number]]  (Abnormal) Collected: 03/04/21 0046     Specimen: Blood Updated: 03/04/21 0139     Vancomycin Peak 42.50 mcg/mL     Extra Tubes [[id number]] Collected: 03/02/21 1351    Specimen: Blood, Venous Line Updated: 03/02/21 1530    Narrative:      The following orders were created for panel order Extra Tubes.  Procedure                               Abnormality         Status                     ---------                               -----------         ------                     Light Blue Top[[id number]]                                   Final result               Gold Top - SST[[id number]]                                   Final result                 Please view results for these tests on the individual orders.    Light Blue Top [[id number]] Collected: 03/02/21 1420    Specimen: Blood Updated: 03/02/21 1530     Extra Tube hold for add-on     Comment: Auto resulted       Gold Top - SST [[id number]] Collected: 03/02/21 1351    Specimen: Blood Updated: 03/02/21 1500     Extra Tube Hold for add-ons.     Comment: Auto resulted.       COVID-19 and FLU A/B PCR - Swab, Nasopharynx [[id number]]  (Normal) Collected: 03/02/21 1402    Specimen: Swab from Nasopharynx Updated: 03/02/21 1432     COVID19 Not Detected     Influenza A PCR Not Detected     Influenza B PCR Not Detected    Narrative:      Fact sheet for providers: https://www.fda.gov/media/683981/download    Fact sheet for patients: https://www.fda.gov/media/683271/download    Test performed by PCR.    Scan Slide [[id number]] Collected: 03/02/21 1213    Specimen: Blood Updated: 03/02/21 1324     RBC Morphology Normal     WBC Morphology Normal     Platelet Estimate Adequate    Comprehensive Metabolic Panel [[id number]]  (Abnormal) Collected: 03/02/21 1213    Specimen: Blood Updated: 03/02/21 1243     Glucose 107 mg/dL      BUN 9 mg/dL      Creatinine 0.66 mg/dL      Sodium 135 mmol/L      Potassium 4.1 mmol/L      Chloride 101 mmol/L      CO2 27.0 mmol/L      Calcium 9.5 mg/dL      Total Protein 7.3 g/dL      Albumin  4.00 g/dL      ALT (SGPT) 10 U/L      AST (SGOT) 17 U/L      Alkaline Phosphatase 113 U/L      Total Bilirubin 0.3 mg/dL      eGFR Non African Amer 93 mL/min/1.73      Globulin 3.3 gm/dL      A/G Ratio 1.2 g/dL      BUN/Creatinine Ratio 13.6     Anion Gap 7.0 mmol/L     Narrative:      GFR Normal >60  Chronic Kidney Disease <60  Kidney Failure <15          Imaging Results (Most Recent)     Procedure Component Value Units Date/Time    XR Abdomen KUB [[id number]] Collected: 03/07/21 1804     Updated: 03/07/21 1818    Narrative:      Exam:  KUB    History:  Vomiting.    Supine film of the abdomen was obtained portably at 5:56 PM.    Comparison: March 17, 2020    No mechanical bowel obstruction.  No organomegaly.  No abnormal calcifications.  No acute osseous abnormality.  Degenerative changes lower thoracic spine.  Minimal levoscoliosis lumbar spine.      Impression:      Conclusion:  Nonobstructive bowel gas pattern    [id number]    Electronically signed by:  Law Gillespie MD  3/7/2021 6:17 PM CST  Workstation: [id number]    US Guidance PICC NC [[id number]] Resulted: 03/05/21 1503     Updated: 03/05/21 1503    Narrative:      This procedure was auto-finalized with no dictation required.    XR Chest Post CVA Port [[id number]] Collected: 03/05/21 1322     Updated: 03/05/21 1338    Narrative:      PROCEDURE: Portable chest x-ray    TECHNIQUE: Single frontal view of the chest    COMPARISON: 3/17/2020    HISTORY: picc placement, M86.9 Osteomyelitis, unspecified    FINDINGS:    Right upper extremity PICC terminates in the SVC  The lungs appear clear. Heart, hilar, and mediastinal structures  appear normal. No osseous abnormality is seen.      Impression:      No acute pulmonary disease.    Electronically signed by:  Art Chen MD  3/5/2021 1:37 PM CST  Workstation: [id number]    IR PICC wo fluoro guidance [[id number]] Resulted: 03/05/21 1321     Updated: 03/05/21 1321    Narrative:      This procedure was auto-finalized  with no dictation required.    MRI Foot Left With & Without Contrast [[id number]] Collected: 03/02/21 1528     Updated: 03/02/21 1639    Narrative:      MRI left foot with and without contrast.    HISTORY: Foot infection. Open wound medial distal foot.    Prior exam: Left foot March 2, 2021. MRI left foot January 14, 2021.    TECHNIQUE: Multiplanar multi sequence images of the left foot  obtained both prior to and following the intravenous infusion of  20 mL ProHance.    FINDINGS: Evidence of prior bunionectomy and osteotomy.    Bone edema distal one half of the first metatarsal. Cortical  irregularity and bony destruction plantar surface distal aspect  first metatarsal. Soft tissue edema and fluid collections medial  aspect of the foot towards the plantar surface first  metatarsophalangeal joint. This most likely represents abscess.    In comparison to prior MRI, the soft tissue fluid collection is  now larger and bone edema first metatarsal is more pronounced  therefore indicating bone infection. Unfavorable change.    Surgical screws through the first metatarsal seen on prior exam  have been removed.      Impression:      Evidence of prior bunionectomy and osteotomy first  metatarsal.    Interval progression of bone edema aspect  of the first  metatarsal suggesting progression of disease, osteomyelitis.  Cortical irregularity, bone destruction plantar surface distal  first metatarsal. Unfavorable change. Soft tissue fluid  collection first plantar surface of the first metatarsophalangeal  joint probably abscess. This is larger than on prior exam.  Unfavorable change.    Surgical screws seen in the first metatarsal on prior MRI  examination have been removed.    MRI left foot is otherwise unremarkable.    Electronically signed by:  Manohar Gaspar MD  3/2/2021 4:38 PM [facility]  Workstation: [id number]          Chief Complaint on Day of Discharge: mild pain on biopsy wound    Hospital Course:  The patient is a 55 y.o. female  "who presented to [facility] with cellulitis on the left foot, first metatarsal area; she started on vancomycin and biopsy was taken due to MRI foot reported OM; but biopsy was negative for an acute infection. She has completed 7 days with vancomycin and will continue with 3 more days of antibiotics, oral, to complete 10 days with it; she would follow with Dr Starks within a week      Condition on Discharge:  stable    Physical Exam on Discharge:  /77 (BP Location: Right arm, Patient Position: Lying)   Pulse 77   Temp 96.3 °F (35.7 °C) (Axillary)   Resp 18   Ht 152.4 cm (60\")   Wt 127 kg (280 lb)   SpO2 96%   BMI 54.68 kg/m²   Physical Exam  Constitutional:       Appearance: Normal appearance. She is obese.   HENT:      Head: Normocephalic.      Nose: Nose normal.      Mouth/Throat:      Mouth: Mucous membranes are moist.   Eyes:      Extraocular Movements: Extraocular movements intact.   Cardiovascular:      Rate and Rhythm: Normal rate.      Heart sounds: Normal heart sounds.   Pulmonary:      Effort: Pulmonary effort is normal.      Breath sounds: Normal breath sounds.   Abdominal:      General: Bowel sounds are normal.      Palpations: Abdomen is soft.   Musculoskeletal:         General: Normal range of motion.      Cervical back: Normal range of motion and neck supple.   Skin:     General: Skin is warm.      Comments: Sutures on the left mid foot, along the metatarsal area; minimal erythema around and minimal tenderness   Neurological:      General: No focal deficit present.      Mental Status: She is alert. Mental status is at baseline.   Psychiatric:         Mood and Affect: Mood normal.           Discharge Disposition: Home      Discharge Medications:     Your medication list      CONTINUE taking these medications      Instructions Last Dose Given Next Dose Due   Claritin 10 MG capsule  Generic drug: Loratadine      Take 10 mg by mouth Daily.       Diovan 320 MG tablet  Generic " drug: valsartan      Take 320 mg by mouth Daily.       fluticasone 50 MCG/ACT nasal spray  Commonly known as: FLONASE      1 spray into the nostril(s) as directed by provider As Needed.       gabapentin 300 MG capsule  Commonly known as: NEURONTIN      Take 300 mg by mouth Every Night. Tabs 2       HYDROcodone-acetaminophen 7.5-325 MG per tablet  Commonly known as: NORCO      Take 1 tablet by mouth.       PEPCID AC PO      Take 20 mg by mouth Daily.       sucralfate 1 g tablet  Commonly known as: CARAFATE           sulfamethoxazole-trimethoprim 800-160 MG per tablet  Commonly known as: Bactrim DS      Take 1 tablet by mouth 2 (Two) Times a Day for 3 days.          STOP taking these medications    pantoprazole 40 MG EC tablet  Commonly known as: PROTONIX              Where to Get Your Medications      These medications were sent to [facility] [facility] [address] - [phone]  - [phone] [place] [address] [address], [facility] [address]    Phone: [phone]   · sulfamethoxazole-trimethoprim 800-160 MG per tablet      Norco 7.5 was changed to 10/325 and she was send home with 9 tablets    Discharge Diet: cardiac diet    Activity at Discharge: as tolerated    Discharge Care Plan/Instructions: see chart    Follow-up Appointments:   No future appointments.   Follow up with Dr Gamaliel Starks within a week    Test Results Pending at Discharge:     Ross Keller MD    Time: 35 min

## 2021-03-08 NOTE — CONSULTS
"Adult Nutrition  Assessment    Patient Name:  Haven Mahoney  YOB: 1965  MRN: [id number]  Admit Date:  3/2/2021    Assessment Date:  3/8/2021    Comments:  RD visit r/t LOS. Pt admit d/t cellulitis and is s/p I&D of left foot and bone biopsy. Pt is morbidly obese at BMI 54 and 280% IBW. Eating well until yesterday when pt began vomiting. Pt reports that is completely resolved now and she plans to increase diet at lunch today. RD briefly reviewed basic healthy eating habits focusing on non sugary beverages and including fresh fruits/vegs/meats in diet. Will monitor course and make recs as needed.     Reason for Assessment     Row Name 03/08/21 1026          Reason for Assessment    Reason For Assessment  per organizational policy LOS     Diagnosis  infection/sepsis     Identified At Risk by Screening Criteria  other (see comments) LOS         Nutrition/Diet History     Row Name 03/08/21 1027          Nutrition/Diet History    Typical Food/Fluid Intake  Pt says she had \"so much vomiting yesterday\", but that is completely resolved today and she plans to eat solid foods at lunch. No prefs/requests voiced.           Labs/Tests/Procedures/Meds     Row Name 03/08/21 1029          Labs/Procedures/Meds    Lab Results Reviewed  reviewed        Diagnostic Tests/Procedures    Diagnostic Test/Procedure Reviewed  reviewed, pertinent     Diagnostic Test/Procedures Comments  s/p I&D and bone biopsy        Medications    Pertinent Medications Reviewed  reviewed         Physical Findings     Row Name 03/08/21 1029          Physical Findings    Overall Physical Appearance  obese     Skin  surgical incision         Estimated/Assessed Needs     Row Name 03/08/21 1029          Calculation Measurements    Weight Used For Calculations  45.4 kg (100 lb)        Estimated/Assessed Needs    Additional Documentation  KCAL/KG (Group);Protein Requirements (Group);Fluid Requirements (Group)        KCAL/KG    KCAL/KG  25 Kcal/Kg " (kcal)     25 Kcal/Kg (kcal)  1134        Protein Requirements    Weight Used For Protein Calculations  45.4 kg (100 lb)     Est Protein Requirement Amount (gms/kg)  0.8 gm protein     Estimated Protein Requirements (gms/day)  36.29        Fluid Requirements    Fluid Requirements (mL/day)  1500     RDA Method (mL)  1500         Nutrition Prescription Ordered     Row Name 03/08/21 1030          Nutrition Prescription PO    Current PO Diet  Clear Liquid     Fluid Consistency  Thin                 Electronically signed by:  Karen Perry RD  03/08/21 10:35 CST

## 2021-03-09 ENCOUNTER — READMISSION MANAGEMENT (OUTPATIENT)
Dept: CALL CENTER | Facility: HOSPITAL | Age: 56
End: 2021-03-09

## 2021-03-09 NOTE — OUTREACH NOTE
Prep Survey      Responses   [facility] facility patient discharged from?  [facility]   Is LACE score < 7 ?  No   Emergency Room discharge w/ pulse ox?  No   Eligibility  Readm Mgmt   Discharge diagnosis  Left foot abscess/cellulitis [s/p I&D]   Does the patient have one of the following disease processes/diagnoses(primary or secondary)?  General Surgery   Does the patient have Home health ordered?  Yes   What is the Home health agency?   [facility] pending   Is there a DME ordered?  No   Comments regarding appointments  Per AVS   Medication alerts for this patient  Per AVS   Prep survey completed?  Yes          Cheyenne Hernandez RN

## 2021-03-10 ENCOUNTER — READMISSION MANAGEMENT (OUTPATIENT)
Dept: CALL CENTER | Facility: HOSPITAL | Age: 56
End: 2021-03-10

## 2021-03-10 NOTE — OUTREACH NOTE
General Surgery Week 1 Survey      Responses   [facility] patient discharged from?  [place]   Does the patient have one of the following disease processes/diagnoses(primary or secondary)?  General Surgery   Week 1 attempt successful?  Yes   Call start time  1133   Call end time  1137   Discharge diagnosis  Left foot abscess/cellulitis   Meds reviewed with patient/caregiver?  Yes   Is the patient having any side effects they believe may be caused by any medication additions or changes?  No   Does the patient have all medications related to this admission filled (includes all antibiotics, pain medications, etc.)  Yes   Is the patient taking all medications as directed (includes completed medication regime)?  Yes   Does the patient have a follow up appointment scheduled with their surgeon?  Yes   Has the patient kept scheduled appointments due by today?  Yes   Has home health visited the patient within 72 hours of discharge?  No   Home health comments  HH d/c   Psychosocial issues?  No   Did the patient receive a copy of their discharge instructions?  Yes   Nursing interventions  Reviewed instructions with patient   What is the patient's perception of their health status since discharge?  Improving   Nursing interventions  Nurse provided patient education   Is the patient /caregiver able to teach back basic post-op care?  Continue use of incentive spirometry at least 1 week post discharge, Lifting as instructed by MD in discharge instructions   Is the patient/caregiver able to teach back signs and symptoms of incisional infection?  Increased redness, swelling or pain at the incisonal site, Increased drainage or bleeding, Incisional warmth, Pus or odor from incision, Fever   Is the patient/caregiver able to teach back steps to recovery at home?  Set small, achievable goals for return to baseline health, Make a list of questions for surgeon's appointment   If the patient is a current smoker, are they able to  teach back resources for cessation?  Not a smoker   Is the patient/caregiver able to teach back the hierarchy of who to call/visit for symptoms/problems? PCP, Specialist, Home health nurse, Urgent Care, ED, 911  Yes   Week 1 call completed?  Yes   Wrap up additional comments  pt feels much better. PICC was d/c  and she did not need HH          Beatriz Hernandez, RN

## 2021-03-17 ENCOUNTER — OFFICE VISIT (OUTPATIENT)
Dept: PODIATRY | Facility: CLINIC | Age: 56
End: 2021-03-17

## 2021-03-17 VITALS — WEIGHT: 280 LBS | BODY MASS INDEX: 54.97 KG/M2 | OXYGEN SATURATION: 96 % | HEART RATE: 109 BPM | HEIGHT: 60 IN

## 2021-03-17 DIAGNOSIS — L03.119 CELLULITIS AND ABSCESS OF FOOT: Primary | ICD-10-CM

## 2021-03-17 DIAGNOSIS — L02.619 CELLULITIS AND ABSCESS OF FOOT: Primary | ICD-10-CM

## 2021-03-17 PROCEDURE — 99024 POSTOP FOLLOW-UP VISIT: CPT | Performed by: PODIATRIST

## 2021-03-17 RX ORDER — FUROSEMIDE 20 MG/1
20 TABLET ORAL
COMMUNITY
Start: 2021-03-15 | End: 2022-09-07

## 2021-03-17 RX ORDER — HYDROCODONE BITARTRATE AND ACETAMINOPHEN 7.5; 325 MG/1; MG/1
TABLET ORAL
COMMUNITY
Start: 2021-03-11 | End: 2022-09-07

## 2021-03-17 NOTE — PROGRESS NOTES
Haven Mahoney  1965  55 y.o. female     Patient returns to clinic for post op appointment of left foot.    03/17/2021        Chief Complaint   Patient presents with   • Left Foot - Post-op Follow-up           History of Present Illness    Haven Mahoney is a 55 y.o. female who presents on follow-up of left foot incision and drainage.  She is feeling quite well today.  Has completed her antibiotics.    Past Medical History:   Diagnosis Date   • Anxiety    • Arthritis    • Arthropathy of lumbar facet joint    • Callus    • Chronic depression    • Degeneration of lumbar intervertebral disc    • Drug therapy     Other long term (current) drug therapy      • Epilepsy (CMS/HCC)    • Foot pain, left    • Headache    • Hearing loss    • Heart problem    • High blood pressure    • Hypercholesterolemia    • Hypertensive disorder    • Obesity    • Osteoporosis    • Osteoporosis    • Pancreatitis    • Seizures (CMS/HCC)    • Varicose veins of lower extremity    • Wears glasses          Past Surgical History:   Procedure Laterality Date   • APPENDECTOMY     • CARDIAC CATHETERIZATION  06/05/2013    Cardiac cath 07722 (1)      • CHOLECYSTECTOMY     • INCISION AND DRAINAGE LEG Left 1/29/2021    Procedure: Left foot incision and drainage, tibial sesamoidectomy            (MIN C-ARM);  Surgeon: Gamaliel Starks DPM;  Location: [facility];  Service: Podiatry;  Laterality: Left;   • INCISION AND DRAINAGE LEG Left 3/4/2021    Procedure: Left foot incision and drainage, bone biopsy;  Surgeon: Gamaliel Starks DPM;  Location: [facility];  Service: Podiatry;  Laterality: Left;   • INJECTION OF MEDICATION  03/28/2016    Injection for nerve block (2)      • OTHER SURGICAL HISTORY  05/05/2016    Incise spinal column/nerves (1)            Family History   Problem Relation Age of Onset   • Asthma Mother    • Cancer Mother         other   • Diabetes Mother    • Heart disease Mother    • Hyperlipidemia Mother    • Migraines Mother  "   • Osteoporosis Mother    • Cancer Father         other   • Hyperlipidemia Father    • Diabetes Maternal Grandmother    • Cancer Maternal Grandmother    • Heart disease Maternal Grandfather    • Mental illness Other         Mental Disorder   • Cancer Maternal Uncle          Social History     Socioeconomic History   • Marital status:      Spouse name: Not on file   • Number of children: Not on file   • Years of education: Not on file   • Highest education level: Not on file   Tobacco Use   • Smoking status: Former Smoker   • Smokeless tobacco: Never Used   Vaping Use   • Vaping Use: Never used   Substance and Sexual Activity   • Alcohol use: No   • Drug use: No   • Sexual activity: Defer         Current Outpatient Medications   Medication Sig Dispense Refill   • Famotidine (PEPCID AC PO) Take 20 mg by mouth Daily.     • fluticasone (FLONASE) 50 MCG/ACT nasal spray 1 spray into the nostril(s) as directed by provider As Needed.     • furosemide (LASIX) 20 MG tablet Take 20 mg by mouth.     • gabapentin (NEURONTIN) 300 MG capsule Take 300 mg by mouth Every Night. Tabs 2     • HYDROcodone-acetaminophen (NORCO) 7.5-325 MG per tablet      • Loratadine (Claritin) 10 MG capsule Take 10 mg by mouth Daily.     • sucralfate (CARAFATE) 1 g tablet      • valsartan (Diovan) 320 MG tablet Take 320 mg by mouth Daily.       No current facility-administered medications for this visit.         OBJECTIVE    Pulse 109   Ht 152.4 cm (60\")   Wt 127 kg (280 lb)   SpO2 96%   BMI 54.68 kg/m²       Review of Systems   Constitutional: Negative.    HENT: Negative.    Eyes: Negative.    Respiratory: Negative.    Cardiovascular: Negative.    Gastrointestinal: Negative.    Endocrine: Negative.    Genitourinary: Negative.    Musculoskeletal: Positive for arthralgias, back pain and joint swelling.        Joint pain and foot pain    Skin: Positive for wound.   Allergic/Immunologic: Negative.    Neurological: Negative.    Hematological: " Negative.    Psychiatric/Behavioral: Negative.          Physical Exam   Constitutional: she appears well-developed and well-nourished.   HEENT: Normocephalic. Atraumatic.  CV: No CP. RRR  Resp: Non-labored respirations.  Psychiatric: she has a normal mood and affect. her behavior is normal.         Lower Extremity Exam:  Pulses palpable  Sensation intact  Minimal edema and erythema to first MTPJ.  Incision well-healed with sutures in place.  No fluctuance.  No drainage.  Can flex and extend all toes of left foot        ASSESSMENT AND PLAN    Diagnoses and all orders for this visit:    1. Cellulitis and abscess of foot (Primary)      -Sutures removed  -Incision/wound site healed  -May discontinue cam boot.  Advised to continue monitoring for increased swelling redness or drainage  -Recheck as needed        This document has been electronically signed by Gamaliel Starks DPM on March 17, 2021 19:47 CDT     EMR Dragon/Transcription disclaimer:   Much of this encounter note is an electronic transcription/translation of spoken language to printed text. The electronic translation of spoken language may permit erroneous, or at times, nonsensical words or phrases to be inadvertently transcribed; Although I have reviewed the note for such errors, some may still exist.    Gamaliel Starks DPM  3/17/2021  19:47 CDT

## 2021-03-18 ENCOUNTER — READMISSION MANAGEMENT (OUTPATIENT)
Dept: CALL CENTER | Facility: HOSPITAL | Age: 56
End: 2021-03-18

## 2021-03-18 NOTE — OUTREACH NOTE
General Surgery Week 2 Survey      Responses   [facility] patient discharged from?  [place]   Does the patient have one of the following disease processes/diagnoses(primary or secondary)?  General Surgery   Week 2 attempt successful?  No   Unsuccessful attempts  Attempt 1          Rosy Reyes LPN

## 2021-03-19 ENCOUNTER — READMISSION MANAGEMENT (OUTPATIENT)
Dept: CALL CENTER | Facility: HOSPITAL | Age: 56
End: 2021-03-19

## 2021-03-19 NOTE — OUTREACH NOTE
General Surgery Week 2 Survey      Responses   [facility] patient discharged from?  [place]   Does the patient have one of the following disease processes/diagnoses(primary or secondary)?  General Surgery   Week 2 attempt successful?  No   Unsuccessful attempts  Attempt 2          Kenia Carney RN

## 2021-03-21 LAB
QT INTERVAL: 384 MS
QTC INTERVAL: 434 MS

## 2021-03-26 ENCOUNTER — READMISSION MANAGEMENT (OUTPATIENT)
Dept: CALL CENTER | Facility: HOSPITAL | Age: 56
End: 2021-03-26

## 2021-03-26 NOTE — OUTREACH NOTE
General Surgery Week 3 Survey      Responses   [facility] patient discharged from?  [place]   Does the patient have one of the following disease processes/diagnoses(primary or secondary)?  General Surgery   Week 3 attempt successful?  No   Unsuccessful attempts  Attempt 1 [ATTEMPTED PATIENT'S AND SPOUSE'S PHONES]          Rosy Reyes LPN

## 2021-03-30 ENCOUNTER — READMISSION MANAGEMENT (OUTPATIENT)
Dept: CALL CENTER | Facility: HOSPITAL | Age: 56
End: 2021-03-30

## 2021-03-30 NOTE — OUTREACH NOTE
General Surgery Week 3 Survey      Responses   [facility] patient discharged from?  [place]   Does the patient have one of the following disease processes/diagnoses(primary or secondary)?  General Surgery   Week 3 attempt successful?  No   Unsuccessful attempts  Attempt 2          Joyce Bliss RN

## 2021-04-28 ENCOUNTER — APPOINTMENT (OUTPATIENT)
Dept: GENERAL RADIOLOGY | Facility: HOSPITAL | Age: 56
End: 2021-04-28

## 2021-04-28 ENCOUNTER — APPOINTMENT (OUTPATIENT)
Dept: CT IMAGING | Facility: HOSPITAL | Age: 56
End: 2021-04-28

## 2021-04-28 ENCOUNTER — HOSPITAL ENCOUNTER (INPATIENT)
Facility: HOSPITAL | Age: 56
LOS: 4 days | Discharge: HOME | End: 2021-05-03
Attending: STUDENT IN AN ORGANIZED HEALTH CARE EDUCATION/TRAINING PROGRAM | Admitting: INTERNAL MEDICINE

## 2021-04-28 DIAGNOSIS — U07.1 COVID-19: Primary | ICD-10-CM

## 2021-04-28 DIAGNOSIS — J96.01 ACUTE RESPIRATORY FAILURE WITH HYPOXIA (HCC): ICD-10-CM

## 2021-04-28 DIAGNOSIS — R07.9 CHEST PAIN, UNSPECIFIED TYPE: ICD-10-CM

## 2021-04-28 DIAGNOSIS — R06.02 SOB (SHORTNESS OF BREATH): ICD-10-CM

## 2021-04-28 LAB
ABO GROUP BLD: NORMAL
ALBUMIN SERPL-MCNC: 3.5 G/DL (ref 3.5–5.2)
ALBUMIN/GLOB SERPL: 1.1 G/DL
ALP SERPL-CCNC: 128 U/L (ref 39–117)
ALT SERPL W P-5'-P-CCNC: 17 U/L (ref 1–33)
ANION GAP SERPL CALCULATED.3IONS-SCNC: 11 MMOL/L (ref 5–15)
ARTERIAL PATENCY WRIST A: ABNORMAL
AST SERPL-CCNC: 38 U/L (ref 1–32)
ATMOSPHERIC PRESS: 747 MMHG
BASE EXCESS BLDA CALC-SCNC: 2.8 MMOL/L (ref 0–2)
BASOPHILS # BLD AUTO: 0.02 10*3/MM3 (ref 0–0.2)
BASOPHILS NFR BLD AUTO: 0.3 % (ref 0–1.5)
BDY SITE: ABNORMAL
BILIRUB SERPL-MCNC: 0.7 MG/DL (ref 0–1.2)
BLD GP AB SCN SERPL QL: NEGATIVE
BUN SERPL-MCNC: 13 MG/DL (ref 6–20)
BUN/CREAT SERPL: 17.3 (ref 7–25)
CALCIUM SPEC-SCNC: 8.7 MG/DL (ref 8.6–10.5)
CHLORIDE SERPL-SCNC: 97 MMOL/L (ref 98–107)
CO2 SERPL-SCNC: 26 MMOL/L (ref 22–29)
CREAT SERPL-MCNC: 0.75 MG/DL (ref 0.57–1)
D-DIMER, QUANTITATIVE (MAD,POW, STR): 867 NG/ML (FEU) (ref 0–470)
D-LACTATE SERPL-SCNC: 0.9 MMOL/L (ref 0.5–2)
DEPRECATED RDW RBC AUTO: 39.9 FL (ref 37–54)
EOSINOPHIL # BLD AUTO: 0 10*3/MM3 (ref 0–0.4)
EOSINOPHIL NFR BLD AUTO: 0 % (ref 0.3–6.2)
ERYTHROCYTE [DISTWIDTH] IN BLOOD BY AUTOMATED COUNT: 13.5 % (ref 12.3–15.4)
FERRITIN SERPL-MCNC: 335.5 NG/ML (ref 13–150)
GFR SERPL CREATININE-BSD FRML MDRD: 80 ML/MIN/1.73
GLOBULIN UR ELPH-MCNC: 3.2 GM/DL
GLUCOSE SERPL-MCNC: 118 MG/DL (ref 65–99)
HCO3 BLDA-SCNC: 27.3 MMOL/L (ref 20–26)
HCT VFR BLD AUTO: 40.6 % (ref 34–46.6)
HGB BLD-MCNC: 13.5 G/DL (ref 12–15.9)
HOLD SPECIMEN: NORMAL
HOLD SPECIMEN: NORMAL
IMM GRANULOCYTES # BLD AUTO: 0.07 10*3/MM3 (ref 0–0.05)
IMM GRANULOCYTES NFR BLD AUTO: 1.1 % (ref 0–0.5)
INHALED O2 CONCENTRATION: 21 %
LDH SERPL-CCNC: 278 U/L (ref 135–214)
LIPASE SERPL-CCNC: 19 U/L (ref 13–60)
LYMPHOCYTES # BLD AUTO: 1.1 10*3/MM3 (ref 0.7–3.1)
LYMPHOCYTES NFR BLD AUTO: 17 % (ref 19.6–45.3)
Lab: ABNORMAL
Lab: NORMAL
MCH RBC QN AUTO: 27.2 PG (ref 26.6–33)
MCHC RBC AUTO-ENTMCNC: 33.3 G/DL (ref 31.5–35.7)
MCV RBC AUTO: 81.7 FL (ref 79–97)
MODALITY: ABNORMAL
MONOCYTES # BLD AUTO: 0.41 10*3/MM3 (ref 0.1–0.9)
MONOCYTES NFR BLD AUTO: 6.3 % (ref 5–12)
NEUTROPHILS NFR BLD AUTO: 4.86 10*3/MM3 (ref 1.7–7)
NEUTROPHILS NFR BLD AUTO: 75.3 % (ref 42.7–76)
NRBC BLD AUTO-RTO: 0 /100 WBC (ref 0–0.2)
NT-PROBNP SERPL-MCNC: 146.2 PG/ML (ref 0–900)
PCO2 BLDA: 40.4 MM HG (ref 35–45)
PH BLDA: 7.44 PH UNITS (ref 7.35–7.45)
PLATELET # BLD AUTO: 187 10*3/MM3 (ref 140–450)
PMV BLD AUTO: 10.1 FL (ref 6–12)
PO2 BLDA: 63.4 MM HG (ref 83–108)
POTASSIUM SERPL-SCNC: 4.2 MMOL/L (ref 3.5–5.2)
PROCALCITONIN SERPL-MCNC: 0.13 NG/ML (ref 0–0.25)
PROT SERPL-MCNC: 6.7 G/DL (ref 6–8.5)
RBC # BLD AUTO: 4.97 10*6/MM3 (ref 3.77–5.28)
RH BLD: POSITIVE
SAO2 % BLDCOA: 93.5 % (ref 94–99)
SARS-COV-2 N GENE RESP QL NAA+PROBE: DETECTED
SODIUM SERPL-SCNC: 134 MMOL/L (ref 136–145)
T&S EXPIRATION DATE: NORMAL
TROPONIN T SERPL-MCNC: <0.01 NG/ML (ref 0–0.03)
VENTILATOR MODE: ABNORMAL
WBC # BLD AUTO: 6.46 10*3/MM3 (ref 3.4–10.8)
WHOLE BLOOD HOLD SPECIMEN: NORMAL
WHOLE BLOOD HOLD SPECIMEN: NORMAL

## 2021-04-28 PROCEDURE — G0378 HOSPITAL OBSERVATION PER HR: HCPCS

## 2021-04-28 PROCEDURE — 93010 ELECTROCARDIOGRAM REPORT: CPT | Performed by: INTERNAL MEDICINE

## 2021-04-28 PROCEDURE — 71275 CT ANGIOGRAPHY CHEST: CPT

## 2021-04-28 PROCEDURE — 94799 UNLISTED PULMONARY SVC/PX: CPT

## 2021-04-28 PROCEDURE — 71046 X-RAY EXAM CHEST 2 VIEWS: CPT

## 2021-04-28 PROCEDURE — 87040 BLOOD CULTURE FOR BACTERIA: CPT | Performed by: STUDENT IN AN ORGANIZED HEALTH CARE EDUCATION/TRAINING PROGRAM

## 2021-04-28 PROCEDURE — 99284 EMERGENCY DEPT VISIT MOD MDM: CPT

## 2021-04-28 PROCEDURE — 83605 ASSAY OF LACTIC ACID: CPT | Performed by: STUDENT IN AN ORGANIZED HEALTH CARE EDUCATION/TRAINING PROGRAM

## 2021-04-28 PROCEDURE — 82728 ASSAY OF FERRITIN: CPT | Performed by: FAMILY MEDICINE

## 2021-04-28 PROCEDURE — 84145 PROCALCITONIN (PCT): CPT | Performed by: FAMILY MEDICINE

## 2021-04-28 PROCEDURE — 85025 COMPLETE CBC W/AUTO DIFF WBC: CPT | Performed by: STUDENT IN AN ORGANIZED HEALTH CARE EDUCATION/TRAINING PROGRAM

## 2021-04-28 PROCEDURE — 85379 FIBRIN DEGRADATION QUANT: CPT | Performed by: STUDENT IN AN ORGANIZED HEALTH CARE EDUCATION/TRAINING PROGRAM

## 2021-04-28 PROCEDURE — 36600 WITHDRAWAL OF ARTERIAL BLOOD: CPT

## 2021-04-28 PROCEDURE — 82803 BLOOD GASES ANY COMBINATION: CPT

## 2021-04-28 PROCEDURE — 25010000002 MORPHINE PER 10 MG: Performed by: STUDENT IN AN ORGANIZED HEALTH CARE EDUCATION/TRAINING PROGRAM

## 2021-04-28 PROCEDURE — 84484 ASSAY OF TROPONIN QUANT: CPT | Performed by: STUDENT IN AN ORGANIZED HEALTH CARE EDUCATION/TRAINING PROGRAM

## 2021-04-28 PROCEDURE — 93005 ELECTROCARDIOGRAM TRACING: CPT | Performed by: STUDENT IN AN ORGANIZED HEALTH CARE EDUCATION/TRAINING PROGRAM

## 2021-04-28 PROCEDURE — 0 IOPAMIDOL PER 1 ML: Performed by: STUDENT IN AN ORGANIZED HEALTH CARE EDUCATION/TRAINING PROGRAM

## 2021-04-28 PROCEDURE — 86900 BLOOD TYPING SEROLOGIC ABO: CPT | Performed by: STUDENT IN AN ORGANIZED HEALTH CARE EDUCATION/TRAINING PROGRAM

## 2021-04-28 PROCEDURE — 25010000002 ENOXAPARIN PER 10 MG: Performed by: FAMILY MEDICINE

## 2021-04-28 PROCEDURE — 25010000002 LEVOFLOXACIN PER 250 MG: Performed by: STUDENT IN AN ORGANIZED HEALTH CARE EDUCATION/TRAINING PROGRAM

## 2021-04-28 PROCEDURE — 86850 RBC ANTIBODY SCREEN: CPT | Performed by: STUDENT IN AN ORGANIZED HEALTH CARE EDUCATION/TRAINING PROGRAM

## 2021-04-28 PROCEDURE — 83880 ASSAY OF NATRIURETIC PEPTIDE: CPT | Performed by: STUDENT IN AN ORGANIZED HEALTH CARE EDUCATION/TRAINING PROGRAM

## 2021-04-28 PROCEDURE — 83690 ASSAY OF LIPASE: CPT | Performed by: STUDENT IN AN ORGANIZED HEALTH CARE EDUCATION/TRAINING PROGRAM

## 2021-04-28 PROCEDURE — 83615 LACTATE (LD) (LDH) ENZYME: CPT | Performed by: FAMILY MEDICINE

## 2021-04-28 PROCEDURE — 25010000002 DEXAMETHASONE PER 1 MG: Performed by: FAMILY MEDICINE

## 2021-04-28 PROCEDURE — 87635 SARS-COV-2 COVID-19 AMP PRB: CPT | Performed by: STUDENT IN AN ORGANIZED HEALTH CARE EDUCATION/TRAINING PROGRAM

## 2021-04-28 PROCEDURE — 80053 COMPREHEN METABOLIC PANEL: CPT | Performed by: STUDENT IN AN ORGANIZED HEALTH CARE EDUCATION/TRAINING PROGRAM

## 2021-04-28 PROCEDURE — 86901 BLOOD TYPING SEROLOGIC RH(D): CPT | Performed by: STUDENT IN AN ORGANIZED HEALTH CARE EDUCATION/TRAINING PROGRAM

## 2021-04-28 RX ORDER — CALCIUM CARBONATE 200(500)MG
2 TABLET,CHEWABLE ORAL 2 TIMES DAILY PRN
Status: DISCONTINUED | OUTPATIENT
Start: 2021-04-28 | End: 2021-05-03 | Stop reason: HOSPADM

## 2021-04-28 RX ORDER — ACETAMINOPHEN 325 MG/1
650 TABLET ORAL EVERY 4 HOURS PRN
Status: DISCONTINUED | OUTPATIENT
Start: 2021-04-28 | End: 2021-05-03 | Stop reason: HOSPADM

## 2021-04-28 RX ORDER — FAMOTIDINE 20 MG/1
20 TABLET, FILM COATED ORAL DAILY
Status: DISCONTINUED | OUTPATIENT
Start: 2021-04-28 | End: 2021-05-03 | Stop reason: HOSPADM

## 2021-04-28 RX ORDER — HYDROCODONE BITARTRATE AND ACETAMINOPHEN 7.5; 325 MG/1; MG/1
1 TABLET ORAL EVERY 4 HOURS PRN
Status: DISCONTINUED | OUTPATIENT
Start: 2021-04-28 | End: 2021-05-03 | Stop reason: HOSPADM

## 2021-04-28 RX ORDER — GABAPENTIN 300 MG/1
300 CAPSULE ORAL NIGHTLY
Status: DISCONTINUED | OUTPATIENT
Start: 2021-04-28 | End: 2021-05-03 | Stop reason: HOSPADM

## 2021-04-28 RX ORDER — ONDANSETRON 2 MG/ML
4 INJECTION INTRAMUSCULAR; INTRAVENOUS EVERY 6 HOURS PRN
Status: DISCONTINUED | OUTPATIENT
Start: 2021-04-28 | End: 2021-05-03 | Stop reason: HOSPADM

## 2021-04-28 RX ORDER — SODIUM CHLORIDE 0.9 % (FLUSH) 0.9 %
10 SYRINGE (ML) INJECTION EVERY 12 HOURS SCHEDULED
Status: DISCONTINUED | OUTPATIENT
Start: 2021-04-28 | End: 2021-05-03 | Stop reason: HOSPADM

## 2021-04-28 RX ORDER — ACETAMINOPHEN 160 MG/5ML
650 SOLUTION ORAL EVERY 4 HOURS PRN
Status: DISCONTINUED | OUTPATIENT
Start: 2021-04-28 | End: 2021-05-03 | Stop reason: HOSPADM

## 2021-04-28 RX ORDER — CETIRIZINE HYDROCHLORIDE 10 MG/1
10 TABLET ORAL DAILY
Status: DISCONTINUED | OUTPATIENT
Start: 2021-04-28 | End: 2021-05-03 | Stop reason: HOSPADM

## 2021-04-28 RX ORDER — VALSARTAN 160 MG/1
320 TABLET ORAL DAILY
Status: DISCONTINUED | OUTPATIENT
Start: 2021-04-28 | End: 2021-04-30

## 2021-04-28 RX ORDER — DEXAMETHASONE SODIUM PHOSPHATE 4 MG/ML
6 INJECTION, SOLUTION INTRA-ARTICULAR; INTRALESIONAL; INTRAMUSCULAR; INTRAVENOUS; SOFT TISSUE DAILY
Status: DISCONTINUED | OUTPATIENT
Start: 2021-04-28 | End: 2021-05-03 | Stop reason: HOSPADM

## 2021-04-28 RX ORDER — ACETAMINOPHEN 650 MG/1
650 SUPPOSITORY RECTAL EVERY 4 HOURS PRN
Status: DISCONTINUED | OUTPATIENT
Start: 2021-04-28 | End: 2021-05-03 | Stop reason: HOSPADM

## 2021-04-28 RX ORDER — ONDANSETRON 4 MG/1
4 TABLET, FILM COATED ORAL EVERY 6 HOURS PRN
Status: DISCONTINUED | OUTPATIENT
Start: 2021-04-28 | End: 2021-05-03 | Stop reason: HOSPADM

## 2021-04-28 RX ORDER — LEVOFLOXACIN 5 MG/ML
750 INJECTION, SOLUTION INTRAVENOUS ONCE
Status: COMPLETED | OUTPATIENT
Start: 2021-04-28 | End: 2021-04-28

## 2021-04-28 RX ORDER — SODIUM CHLORIDE 0.9 % (FLUSH) 0.9 %
10 SYRINGE (ML) INJECTION AS NEEDED
Status: DISCONTINUED | OUTPATIENT
Start: 2021-04-28 | End: 2021-05-03 | Stop reason: HOSPADM

## 2021-04-28 RX ORDER — ALBUTEROL SULFATE 90 UG/1
2 AEROSOL, METERED RESPIRATORY (INHALATION)
Status: DISCONTINUED | OUTPATIENT
Start: 2021-04-28 | End: 2021-05-03 | Stop reason: HOSPADM

## 2021-04-28 RX ADMIN — IOPAMIDOL 64 ML: 755 INJECTION, SOLUTION INTRAVENOUS at 12:04

## 2021-04-28 RX ADMIN — MORPHINE SULFATE 4 MG: 4 INJECTION INTRAVENOUS at 12:44

## 2021-04-28 RX ADMIN — SODIUM CHLORIDE, PRESERVATIVE FREE 10 ML: 5 INJECTION INTRAVENOUS at 21:32

## 2021-04-28 RX ADMIN — HYDROCODONE BITARTRATE AND ACETAMINOPHEN 1 TABLET: 7.5; 325 TABLET ORAL at 21:30

## 2021-04-28 RX ADMIN — CETIRIZINE HYDROCHLORIDE 10 MG: 10 TABLET, FILM COATED ORAL at 21:31

## 2021-04-28 RX ADMIN — ENOXAPARIN SODIUM 40 MG: 40 INJECTION SUBCUTANEOUS at 23:23

## 2021-04-28 RX ADMIN — HYDROCODONE BITARTRATE AND ACETAMINOPHEN 1 TABLET: 7.5; 325 TABLET ORAL at 17:28

## 2021-04-28 RX ADMIN — VALSARTAN 320 MG: 160 TABLET, FILM COATED ORAL at 23:24

## 2021-04-28 RX ADMIN — FAMOTIDINE 20 MG: 20 TABLET, FILM COATED ORAL at 21:32

## 2021-04-28 RX ADMIN — GABAPENTIN 300 MG: 300 CAPSULE ORAL at 21:31

## 2021-04-28 RX ADMIN — SODIUM CHLORIDE, POTASSIUM CHLORIDE, SODIUM LACTATE AND CALCIUM CHLORIDE 1000 ML: 600; 310; 30; 20 INJECTION, SOLUTION INTRAVENOUS at 10:59

## 2021-04-28 RX ADMIN — LEVOFLOXACIN 750 MG: 5 INJECTION, SOLUTION INTRAVENOUS at 11:28

## 2021-04-28 RX ADMIN — ALBUTEROL SULFATE 2 PUFF: 90 AEROSOL, METERED RESPIRATORY (INHALATION) at 19:24

## 2021-04-28 RX ADMIN — DEXAMETHASONE SODIUM PHOSPHATE 6 MG: 4 INJECTION, SOLUTION INTRAMUSCULAR; INTRAVENOUS at 21:31

## 2021-04-29 LAB
ALBUMIN SERPL-MCNC: 3.5 G/DL (ref 3.5–5.2)
ALBUMIN SERPL-MCNC: 3.6 G/DL (ref 3.5–5.2)
ALBUMIN/GLOB SERPL: 1 G/DL
ALP SERPL-CCNC: 123 U/L (ref 39–117)
ALP SERPL-CCNC: 125 U/L (ref 39–117)
ALT SERPL W P-5'-P-CCNC: 13 U/L (ref 1–33)
ALT SERPL W P-5'-P-CCNC: 14 U/L (ref 1–33)
ANION GAP SERPL CALCULATED.3IONS-SCNC: 10 MMOL/L (ref 5–15)
AST SERPL-CCNC: 26 U/L (ref 1–32)
AST SERPL-CCNC: 28 U/L (ref 1–32)
BASOPHILS # BLD AUTO: 0.01 10*3/MM3 (ref 0–0.2)
BASOPHILS NFR BLD AUTO: 0.3 % (ref 0–1.5)
BILIRUB CONJ SERPL-MCNC: 0.2 MG/DL (ref 0–0.3)
BILIRUB INDIRECT SERPL-MCNC: 0.3 MG/DL
BILIRUB SERPL-MCNC: 0.5 MG/DL (ref 0–1.2)
BILIRUB SERPL-MCNC: 0.5 MG/DL (ref 0–1.2)
BUN SERPL-MCNC: 12 MG/DL (ref 6–20)
BUN/CREAT SERPL: 17.6 (ref 7–25)
CALCIUM SPEC-SCNC: 9 MG/DL (ref 8.6–10.5)
CHLORIDE SERPL-SCNC: 98 MMOL/L (ref 98–107)
CK SERPL-CCNC: 30 U/L (ref 20–180)
CO2 SERPL-SCNC: 27 MMOL/L (ref 22–29)
CREAT SERPL-MCNC: 0.68 MG/DL (ref 0.57–1)
CRP SERPL-MCNC: 8.42 MG/DL (ref 0–0.5)
D-DIMER, QUANTITATIVE (MAD,POW, STR): 671 NG/ML (FEU) (ref 0–470)
DEPRECATED RDW RBC AUTO: 39.8 FL (ref 37–54)
EOSINOPHIL # BLD AUTO: 0 10*3/MM3 (ref 0–0.4)
EOSINOPHIL NFR BLD AUTO: 0 % (ref 0.3–6.2)
ERYTHROCYTE [DISTWIDTH] IN BLOOD BY AUTOMATED COUNT: 13.4 % (ref 12.3–15.4)
FERRITIN SERPL-MCNC: 344.1 NG/ML (ref 13–150)
FIBRINOGEN PPP-MCNC: 745 MG/DL (ref 228–514)
GFR SERPL CREATININE-BSD FRML MDRD: 90 ML/MIN/1.73
GLOBULIN UR ELPH-MCNC: 3.5 GM/DL
GLUCOSE SERPL-MCNC: 173 MG/DL (ref 65–99)
HCT VFR BLD AUTO: 41.1 % (ref 34–46.6)
HGB BLD-MCNC: 13.3 G/DL (ref 12–15.9)
IMM GRANULOCYTES # BLD AUTO: 0.04 10*3/MM3 (ref 0–0.05)
IMM GRANULOCYTES NFR BLD AUTO: 1.2 % (ref 0–0.5)
LDH SERPL-CCNC: 225 U/L (ref 135–214)
LYMPHOCYTES # BLD AUTO: 0.62 10*3/MM3 (ref 0.7–3.1)
LYMPHOCYTES NFR BLD AUTO: 18.3 % (ref 19.6–45.3)
MCH RBC QN AUTO: 26.4 PG (ref 26.6–33)
MCHC RBC AUTO-ENTMCNC: 32.4 G/DL (ref 31.5–35.7)
MCV RBC AUTO: 81.7 FL (ref 79–97)
MONOCYTES # BLD AUTO: 0.15 10*3/MM3 (ref 0.1–0.9)
MONOCYTES NFR BLD AUTO: 4.4 % (ref 5–12)
NEUTROPHILS NFR BLD AUTO: 2.56 10*3/MM3 (ref 1.7–7)
NEUTROPHILS NFR BLD AUTO: 75.8 % (ref 42.7–76)
NRBC BLD AUTO-RTO: 0 /100 WBC (ref 0–0.2)
PLATELET # BLD AUTO: 167 10*3/MM3 (ref 140–450)
PMV BLD AUTO: 9.8 FL (ref 6–12)
POTASSIUM SERPL-SCNC: 4.5 MMOL/L (ref 3.5–5.2)
PROT SERPL-MCNC: 7 G/DL (ref 6–8.5)
PROT SERPL-MCNC: 7.1 G/DL (ref 6–8.5)
RBC # BLD AUTO: 5.03 10*6/MM3 (ref 3.77–5.28)
SODIUM SERPL-SCNC: 135 MMOL/L (ref 136–145)
WBC # BLD AUTO: 3.38 10*3/MM3 (ref 3.4–10.8)

## 2021-04-29 PROCEDURE — 85384 FIBRINOGEN ACTIVITY: CPT | Performed by: FAMILY MEDICINE

## 2021-04-29 PROCEDURE — 85025 COMPLETE CBC W/AUTO DIFF WBC: CPT | Performed by: FAMILY MEDICINE

## 2021-04-29 PROCEDURE — 80076 HEPATIC FUNCTION PANEL: CPT | Performed by: INTERNAL MEDICINE

## 2021-04-29 PROCEDURE — 86140 C-REACTIVE PROTEIN: CPT | Performed by: FAMILY MEDICINE

## 2021-04-29 PROCEDURE — 87040 BLOOD CULTURE FOR BACTERIA: CPT | Performed by: STUDENT IN AN ORGANIZED HEALTH CARE EDUCATION/TRAINING PROGRAM

## 2021-04-29 PROCEDURE — 80053 COMPREHEN METABOLIC PANEL: CPT | Performed by: FAMILY MEDICINE

## 2021-04-29 PROCEDURE — 25010000002 FUROSEMIDE PER 20 MG: Performed by: INTERNAL MEDICINE

## 2021-04-29 PROCEDURE — 25010000002 ENOXAPARIN PER 10 MG: Performed by: FAMILY MEDICINE

## 2021-04-29 PROCEDURE — 94799 UNLISTED PULMONARY SVC/PX: CPT

## 2021-04-29 PROCEDURE — 83615 LACTATE (LD) (LDH) ENZYME: CPT | Performed by: FAMILY MEDICINE

## 2021-04-29 PROCEDURE — XW033E5 INTRODUCTION OF REMDESIVIR ANTI-INFECTIVE INTO PERIPHERAL VEIN, PERCUTANEOUS APPROACH, NEW TECHNOLOGY GROUP 5: ICD-10-PCS | Performed by: INTERNAL MEDICINE

## 2021-04-29 PROCEDURE — 94760 N-INVAS EAR/PLS OXIMETRY 1: CPT

## 2021-04-29 PROCEDURE — 85379 FIBRIN DEGRADATION QUANT: CPT | Performed by: FAMILY MEDICINE

## 2021-04-29 PROCEDURE — 82728 ASSAY OF FERRITIN: CPT | Performed by: FAMILY MEDICINE

## 2021-04-29 PROCEDURE — 63710000001 DEXAMETHASONE PER 0.25 MG: Performed by: FAMILY MEDICINE

## 2021-04-29 PROCEDURE — 82550 ASSAY OF CK (CPK): CPT | Performed by: FAMILY MEDICINE

## 2021-04-29 RX ORDER — FUROSEMIDE 10 MG/ML
20 INJECTION INTRAMUSCULAR; INTRAVENOUS EVERY 12 HOURS
Status: COMPLETED | OUTPATIENT
Start: 2021-04-29 | End: 2021-05-01

## 2021-04-29 RX ORDER — MONTELUKAST SODIUM 10 MG/1
10 TABLET ORAL NIGHTLY
Status: DISCONTINUED | OUTPATIENT
Start: 2021-04-29 | End: 2021-05-03 | Stop reason: HOSPADM

## 2021-04-29 RX ADMIN — ALBUTEROL SULFATE 2 PUFF: 90 AEROSOL, METERED RESPIRATORY (INHALATION) at 16:08

## 2021-04-29 RX ADMIN — ALBUTEROL SULFATE 2 PUFF: 90 AEROSOL, METERED RESPIRATORY (INHALATION) at 19:34

## 2021-04-29 RX ADMIN — HYDROCODONE BITARTRATE AND ACETAMINOPHEN 1 TABLET: 7.5; 325 TABLET ORAL at 12:03

## 2021-04-29 RX ADMIN — FAMOTIDINE 20 MG: 20 TABLET, FILM COATED ORAL at 20:45

## 2021-04-29 RX ADMIN — SODIUM CHLORIDE, PRESERVATIVE FREE 10 ML: 5 INJECTION INTRAVENOUS at 20:46

## 2021-04-29 RX ADMIN — HYDROCODONE BITARTRATE AND ACETAMINOPHEN 1 TABLET: 7.5; 325 TABLET ORAL at 16:18

## 2021-04-29 RX ADMIN — DOXYCYCLINE 100 MG: 100 INJECTION, POWDER, LYOPHILIZED, FOR SOLUTION INTRAVENOUS at 13:07

## 2021-04-29 RX ADMIN — HYDROCODONE BITARTRATE AND ACETAMINOPHEN 1 TABLET: 7.5; 325 TABLET ORAL at 20:45

## 2021-04-29 RX ADMIN — HYDROCODONE BITARTRATE AND ACETAMINOPHEN 1 TABLET: 7.5; 325 TABLET ORAL at 03:57

## 2021-04-29 RX ADMIN — FUROSEMIDE 20 MG: 10 INJECTION, SOLUTION INTRAMUSCULAR; INTRAVENOUS at 20:46

## 2021-04-29 RX ADMIN — ENOXAPARIN SODIUM 40 MG: 40 INJECTION SUBCUTANEOUS at 17:31

## 2021-04-29 RX ADMIN — DEXAMETHASONE 6 MG: 2 TABLET ORAL at 08:18

## 2021-04-29 RX ADMIN — CETIRIZINE HYDROCHLORIDE 10 MG: 10 TABLET, FILM COATED ORAL at 08:18

## 2021-04-29 RX ADMIN — VALSARTAN 320 MG: 160 TABLET, FILM COATED ORAL at 08:18

## 2021-04-29 RX ADMIN — FUROSEMIDE 20 MG: 10 INJECTION, SOLUTION INTRAMUSCULAR; INTRAVENOUS at 09:38

## 2021-04-29 RX ADMIN — ALBUTEROL SULFATE 2 PUFF: 90 AEROSOL, METERED RESPIRATORY (INHALATION) at 12:30

## 2021-04-29 RX ADMIN — REMDESIVIR 200 MG: 100 INJECTION, POWDER, LYOPHILIZED, FOR SOLUTION INTRAVENOUS at 12:03

## 2021-04-29 RX ADMIN — GABAPENTIN 300 MG: 300 CAPSULE ORAL at 20:46

## 2021-04-29 RX ADMIN — ALBUTEROL SULFATE 2 PUFF: 90 AEROSOL, METERED RESPIRATORY (INHALATION) at 08:36

## 2021-04-29 RX ADMIN — MONTELUKAST SODIUM 10 MG: 10 TABLET, COATED ORAL at 20:46

## 2021-04-29 RX ADMIN — HYDROCODONE BITARTRATE AND ACETAMINOPHEN 1 TABLET: 7.5; 325 TABLET ORAL at 08:18

## 2021-04-30 LAB
ALBUMIN SERPL-MCNC: 3.7 G/DL (ref 3.5–5.2)
ALBUMIN/GLOB SERPL: 1.2 G/DL
ALP SERPL-CCNC: 109 U/L (ref 39–117)
ALT SERPL W P-5'-P-CCNC: 12 U/L (ref 1–33)
ANION GAP SERPL CALCULATED.3IONS-SCNC: 9 MMOL/L (ref 5–15)
AST SERPL-CCNC: 16 U/L (ref 1–32)
BASOPHILS # BLD AUTO: 0.01 10*3/MM3 (ref 0–0.2)
BASOPHILS NFR BLD AUTO: 0.2 % (ref 0–1.5)
BILIRUB CONJ SERPL-MCNC: 0.2 MG/DL (ref 0–0.3)
BILIRUB SERPL-MCNC: 0.4 MG/DL (ref 0–1.2)
BUN SERPL-MCNC: 21 MG/DL (ref 6–20)
BUN/CREAT SERPL: 30.9 (ref 7–25)
CALCIUM SPEC-SCNC: 9.5 MG/DL (ref 8.6–10.5)
CHLORIDE SERPL-SCNC: 97 MMOL/L (ref 98–107)
CK SERPL-CCNC: 33 U/L (ref 20–180)
CO2 SERPL-SCNC: 28 MMOL/L (ref 22–29)
CREAT SERPL-MCNC: 0.68 MG/DL (ref 0.57–1)
CRP SERPL-MCNC: 3.86 MG/DL (ref 0–0.5)
DEPRECATED RDW RBC AUTO: 39.4 FL (ref 37–54)
EOSINOPHIL # BLD AUTO: 0 10*3/MM3 (ref 0–0.4)
EOSINOPHIL NFR BLD AUTO: 0 % (ref 0.3–6.2)
ERYTHROCYTE [DISTWIDTH] IN BLOOD BY AUTOMATED COUNT: 13.4 % (ref 12.3–15.4)
FERRITIN SERPL-MCNC: 338.3 NG/ML (ref 13–150)
GFR SERPL CREATININE-BSD FRML MDRD: 90 ML/MIN/1.73
GLOBULIN UR ELPH-MCNC: 3.1 GM/DL
GLUCOSE SERPL-MCNC: 159 MG/DL (ref 65–99)
HCT VFR BLD AUTO: 39.7 % (ref 34–46.6)
HGB BLD-MCNC: 13.3 G/DL (ref 12–15.9)
IMM GRANULOCYTES # BLD AUTO: 0.04 10*3/MM3 (ref 0–0.05)
IMM GRANULOCYTES NFR BLD AUTO: 0.9 % (ref 0–0.5)
LYMPHOCYTES # BLD AUTO: 0.73 10*3/MM3 (ref 0.7–3.1)
LYMPHOCYTES NFR BLD AUTO: 16.1 % (ref 19.6–45.3)
MCH RBC QN AUTO: 27.2 PG (ref 26.6–33)
MCHC RBC AUTO-ENTMCNC: 33.5 G/DL (ref 31.5–35.7)
MCV RBC AUTO: 81.2 FL (ref 79–97)
MONOCYTES # BLD AUTO: 0.35 10*3/MM3 (ref 0.1–0.9)
MONOCYTES NFR BLD AUTO: 7.7 % (ref 5–12)
NEUTROPHILS NFR BLD AUTO: 3.41 10*3/MM3 (ref 1.7–7)
NEUTROPHILS NFR BLD AUTO: 75.1 % (ref 42.7–76)
NRBC BLD AUTO-RTO: 0 /100 WBC (ref 0–0.2)
PLATELET # BLD AUTO: 197 10*3/MM3 (ref 140–450)
PMV BLD AUTO: 10.5 FL (ref 6–12)
POTASSIUM SERPL-SCNC: 4.4 MMOL/L (ref 3.5–5.2)
PROT SERPL-MCNC: 6.8 G/DL (ref 6–8.5)
RBC # BLD AUTO: 4.89 10*6/MM3 (ref 3.77–5.28)
SODIUM SERPL-SCNC: 134 MMOL/L (ref 136–145)
WBC # BLD AUTO: 4.54 10*3/MM3 (ref 3.4–10.8)

## 2021-04-30 PROCEDURE — 82728 ASSAY OF FERRITIN: CPT | Performed by: FAMILY MEDICINE

## 2021-04-30 PROCEDURE — 86140 C-REACTIVE PROTEIN: CPT | Performed by: FAMILY MEDICINE

## 2021-04-30 PROCEDURE — 25010000002 FUROSEMIDE PER 20 MG: Performed by: INTERNAL MEDICINE

## 2021-04-30 PROCEDURE — 94760 N-INVAS EAR/PLS OXIMETRY 1: CPT

## 2021-04-30 PROCEDURE — 94799 UNLISTED PULMONARY SVC/PX: CPT

## 2021-04-30 PROCEDURE — 85025 COMPLETE CBC W/AUTO DIFF WBC: CPT | Performed by: FAMILY MEDICINE

## 2021-04-30 PROCEDURE — 63710000001 DEXAMETHASONE PER 0.25 MG: Performed by: FAMILY MEDICINE

## 2021-04-30 PROCEDURE — 25010000002 ENOXAPARIN PER 10 MG: Performed by: FAMILY MEDICINE

## 2021-04-30 PROCEDURE — 80053 COMPREHEN METABOLIC PANEL: CPT | Performed by: FAMILY MEDICINE

## 2021-04-30 PROCEDURE — 82550 ASSAY OF CK (CPK): CPT | Performed by: FAMILY MEDICINE

## 2021-04-30 PROCEDURE — 82248 BILIRUBIN DIRECT: CPT | Performed by: INTERNAL MEDICINE

## 2021-04-30 RX ADMIN — MONTELUKAST SODIUM 10 MG: 10 TABLET, COATED ORAL at 19:43

## 2021-04-30 RX ADMIN — HYDROCODONE BITARTRATE AND ACETAMINOPHEN 1 TABLET: 7.5; 325 TABLET ORAL at 10:58

## 2021-04-30 RX ADMIN — SODIUM CHLORIDE, PRESERVATIVE FREE 10 ML: 5 INJECTION INTRAVENOUS at 19:43

## 2021-04-30 RX ADMIN — CETIRIZINE HYDROCHLORIDE 10 MG: 10 TABLET, FILM COATED ORAL at 08:41

## 2021-04-30 RX ADMIN — DEXAMETHASONE 6 MG: 2 TABLET ORAL at 08:41

## 2021-04-30 RX ADMIN — ALBUTEROL SULFATE 2 PUFF: 90 AEROSOL, METERED RESPIRATORY (INHALATION) at 11:11

## 2021-04-30 RX ADMIN — VALSARTAN 320 MG: 160 TABLET, FILM COATED ORAL at 08:41

## 2021-04-30 RX ADMIN — HYDROCODONE BITARTRATE AND ACETAMINOPHEN 1 TABLET: 7.5; 325 TABLET ORAL at 06:16

## 2021-04-30 RX ADMIN — DOXYCYCLINE 100 MG: 100 INJECTION, POWDER, LYOPHILIZED, FOR SOLUTION INTRAVENOUS at 23:26

## 2021-04-30 RX ADMIN — ENOXAPARIN SODIUM 40 MG: 40 INJECTION SUBCUTANEOUS at 16:01

## 2021-04-30 RX ADMIN — FUROSEMIDE 20 MG: 10 INJECTION, SOLUTION INTRAMUSCULAR; INTRAVENOUS at 19:43

## 2021-04-30 RX ADMIN — FAMOTIDINE 20 MG: 20 TABLET, FILM COATED ORAL at 19:43

## 2021-04-30 RX ADMIN — HYDROCODONE BITARTRATE AND ACETAMINOPHEN 1 TABLET: 7.5; 325 TABLET ORAL at 02:02

## 2021-04-30 RX ADMIN — SODIUM CHLORIDE, PRESERVATIVE FREE 10 ML: 5 INJECTION INTRAVENOUS at 08:42

## 2021-04-30 RX ADMIN — HYDROCODONE BITARTRATE AND ACETAMINOPHEN 1 TABLET: 7.5; 325 TABLET ORAL at 16:01

## 2021-04-30 RX ADMIN — DOXYCYCLINE 100 MG: 100 INJECTION, POWDER, LYOPHILIZED, FOR SOLUTION INTRAVENOUS at 09:45

## 2021-04-30 RX ADMIN — REMDESIVIR 100 MG: 100 INJECTION, POWDER, LYOPHILIZED, FOR SOLUTION INTRAVENOUS at 08:40

## 2021-04-30 RX ADMIN — ALBUTEROL SULFATE 2 PUFF: 90 AEROSOL, METERED RESPIRATORY (INHALATION) at 19:31

## 2021-04-30 RX ADMIN — HYDROCODONE BITARTRATE AND ACETAMINOPHEN 1 TABLET: 7.5; 325 TABLET ORAL at 19:49

## 2021-04-30 RX ADMIN — ALBUTEROL SULFATE 2 PUFF: 90 AEROSOL, METERED RESPIRATORY (INHALATION) at 06:58

## 2021-04-30 RX ADMIN — FUROSEMIDE 20 MG: 10 INJECTION, SOLUTION INTRAMUSCULAR; INTRAVENOUS at 08:42

## 2021-04-30 RX ADMIN — DOXYCYCLINE 100 MG: 100 INJECTION, POWDER, LYOPHILIZED, FOR SOLUTION INTRAVENOUS at 00:46

## 2021-04-30 RX ADMIN — ALBUTEROL SULFATE 2 PUFF: 90 AEROSOL, METERED RESPIRATORY (INHALATION) at 14:26

## 2021-04-30 RX ADMIN — GABAPENTIN 300 MG: 300 CAPSULE ORAL at 19:43

## 2021-05-01 LAB
ALBUMIN SERPL-MCNC: 3.9 G/DL (ref 3.5–5.2)
ALBUMIN/GLOB SERPL: 1.3 G/DL
ALP SERPL-CCNC: 101 U/L (ref 39–117)
ALT SERPL W P-5'-P-CCNC: 13 U/L (ref 1–33)
ANION GAP SERPL CALCULATED.3IONS-SCNC: 9 MMOL/L (ref 5–15)
AST SERPL-CCNC: 20 U/L (ref 1–32)
BILIRUB CONJ SERPL-MCNC: 0.2 MG/DL (ref 0–0.3)
BILIRUB SERPL-MCNC: 0.3 MG/DL (ref 0–1.2)
BUN SERPL-MCNC: 34 MG/DL (ref 6–20)
BUN/CREAT SERPL: 43.6 (ref 7–25)
CALCIUM SPEC-SCNC: 9.5 MG/DL (ref 8.6–10.5)
CHLORIDE SERPL-SCNC: 98 MMOL/L (ref 98–107)
CK SERPL-CCNC: 23 U/L (ref 20–180)
CO2 SERPL-SCNC: 27 MMOL/L (ref 22–29)
CREAT SERPL-MCNC: 0.78 MG/DL (ref 0.57–1)
CRP SERPL-MCNC: 0.97 MG/DL (ref 0–0.5)
D-DIMER, QUANTITATIVE (MAD,POW, STR): 326 NG/ML (FEU) (ref 0–470)
DEPRECATED RDW RBC AUTO: 40.4 FL (ref 37–54)
ERYTHROCYTE [DISTWIDTH] IN BLOOD BY AUTOMATED COUNT: 13.4 % (ref 12.3–15.4)
FERRITIN SERPL-MCNC: 299.9 NG/ML (ref 13–150)
FIBRINOGEN PPP-MCNC: 504 MG/DL (ref 228–514)
GFR SERPL CREATININE-BSD FRML MDRD: 76 ML/MIN/1.73
GIANT PLATELETS: ABNORMAL
GLOBULIN UR ELPH-MCNC: 2.9 GM/DL
GLUCOSE SERPL-MCNC: 204 MG/DL (ref 65–99)
HCT VFR BLD AUTO: 42.6 % (ref 34–46.6)
HGB BLD-MCNC: 13.6 G/DL (ref 12–15.9)
LDH SERPL-CCNC: 169 U/L (ref 135–214)
LYMPHOCYTES # BLD MANUAL: 1.07 10*3/MM3 (ref 0.7–3.1)
LYMPHOCYTES NFR BLD MANUAL: 20 % (ref 19.6–45.3)
LYMPHOCYTES NFR BLD MANUAL: 3 % (ref 5–12)
MCH RBC QN AUTO: 26.6 PG (ref 26.6–33)
MCHC RBC AUTO-ENTMCNC: 31.9 G/DL (ref 31.5–35.7)
MCV RBC AUTO: 83.2 FL (ref 79–97)
MONOCYTES # BLD AUTO: 0.15 10*3/MM3 (ref 0.1–0.9)
NEUTROPHILS # BLD AUTO: 3.64 10*3/MM3 (ref 1.7–7)
NEUTROPHILS NFR BLD MANUAL: 70 % (ref 42.7–76)
NEUTS BAND NFR BLD MANUAL: 5 % (ref 0–5)
PLATELET # BLD AUTO: 151 10*3/MM3 (ref 140–450)
PMV BLD AUTO: 10.8 FL (ref 6–12)
POTASSIUM SERPL-SCNC: 4.2 MMOL/L (ref 3.5–5.2)
PROT SERPL-MCNC: 6.8 G/DL (ref 6–8.5)
RBC # BLD AUTO: 5.12 10*6/MM3 (ref 3.77–5.28)
RBC MORPH BLD: NORMAL
SMALL PLATELETS BLD QL SMEAR: ADEQUATE
SODIUM SERPL-SCNC: 134 MMOL/L (ref 136–145)
VARIANT LYMPHS NFR BLD MANUAL: 2 % (ref 0–5)
WBC # BLD AUTO: 4.85 10*3/MM3 (ref 3.4–10.8)
WBC MORPH BLD: NORMAL

## 2021-05-01 PROCEDURE — 85025 COMPLETE CBC W/AUTO DIFF WBC: CPT | Performed by: FAMILY MEDICINE

## 2021-05-01 PROCEDURE — 85384 FIBRINOGEN ACTIVITY: CPT | Performed by: FAMILY MEDICINE

## 2021-05-01 PROCEDURE — 25010000002 FUROSEMIDE PER 20 MG: Performed by: INTERNAL MEDICINE

## 2021-05-01 PROCEDURE — 83615 LACTATE (LD) (LDH) ENZYME: CPT | Performed by: FAMILY MEDICINE

## 2021-05-01 PROCEDURE — 82248 BILIRUBIN DIRECT: CPT | Performed by: INTERNAL MEDICINE

## 2021-05-01 PROCEDURE — 94664 DEMO&/EVAL PT USE INHALER: CPT

## 2021-05-01 PROCEDURE — 25010000002 ENOXAPARIN PER 10 MG: Performed by: FAMILY MEDICINE

## 2021-05-01 PROCEDURE — 82728 ASSAY OF FERRITIN: CPT | Performed by: FAMILY MEDICINE

## 2021-05-01 PROCEDURE — 82550 ASSAY OF CK (CPK): CPT | Performed by: FAMILY MEDICINE

## 2021-05-01 PROCEDURE — 86140 C-REACTIVE PROTEIN: CPT | Performed by: FAMILY MEDICINE

## 2021-05-01 PROCEDURE — 94760 N-INVAS EAR/PLS OXIMETRY 1: CPT

## 2021-05-01 PROCEDURE — 94799 UNLISTED PULMONARY SVC/PX: CPT

## 2021-05-01 PROCEDURE — 85007 BL SMEAR W/DIFF WBC COUNT: CPT | Performed by: FAMILY MEDICINE

## 2021-05-01 PROCEDURE — 80053 COMPREHEN METABOLIC PANEL: CPT | Performed by: FAMILY MEDICINE

## 2021-05-01 PROCEDURE — 63710000001 DEXAMETHASONE PER 0.25 MG: Performed by: FAMILY MEDICINE

## 2021-05-01 PROCEDURE — 85379 FIBRIN DEGRADATION QUANT: CPT | Performed by: FAMILY MEDICINE

## 2021-05-01 RX ADMIN — DEXAMETHASONE 6 MG: 2 TABLET ORAL at 08:47

## 2021-05-01 RX ADMIN — HYDROCODONE BITARTRATE AND ACETAMINOPHEN 1 TABLET: 7.5; 325 TABLET ORAL at 19:22

## 2021-05-01 RX ADMIN — MONTELUKAST SODIUM 10 MG: 10 TABLET, COATED ORAL at 19:22

## 2021-05-01 RX ADMIN — ENOXAPARIN SODIUM 40 MG: 40 INJECTION SUBCUTANEOUS at 17:16

## 2021-05-01 RX ADMIN — HYDROCODONE BITARTRATE AND ACETAMINOPHEN 1 TABLET: 7.5; 325 TABLET ORAL at 14:58

## 2021-05-01 RX ADMIN — FAMOTIDINE 20 MG: 20 TABLET, FILM COATED ORAL at 19:22

## 2021-05-01 RX ADMIN — ALBUTEROL SULFATE 2 PUFF: 90 AEROSOL, METERED RESPIRATORY (INHALATION) at 15:40

## 2021-05-01 RX ADMIN — REMDESIVIR 100 MG: 100 INJECTION, POWDER, LYOPHILIZED, FOR SOLUTION INTRAVENOUS at 08:47

## 2021-05-01 RX ADMIN — GABAPENTIN 300 MG: 300 CAPSULE ORAL at 19:22

## 2021-05-01 RX ADMIN — HYDROCODONE BITARTRATE AND ACETAMINOPHEN 1 TABLET: 7.5; 325 TABLET ORAL at 04:47

## 2021-05-01 RX ADMIN — HYDROCODONE BITARTRATE AND ACETAMINOPHEN 1 TABLET: 7.5; 325 TABLET ORAL at 10:07

## 2021-05-01 RX ADMIN — DOXYCYCLINE 100 MG: 100 INJECTION, POWDER, LYOPHILIZED, FOR SOLUTION INTRAVENOUS at 23:22

## 2021-05-01 RX ADMIN — ALBUTEROL SULFATE 2 PUFF: 90 AEROSOL, METERED RESPIRATORY (INHALATION) at 11:28

## 2021-05-01 RX ADMIN — HYDROCODONE BITARTRATE AND ACETAMINOPHEN 1 TABLET: 7.5; 325 TABLET ORAL at 00:32

## 2021-05-01 RX ADMIN — FUROSEMIDE 20 MG: 10 INJECTION, SOLUTION INTRAMUSCULAR; INTRAVENOUS at 19:22

## 2021-05-01 RX ADMIN — SODIUM CHLORIDE, PRESERVATIVE FREE 10 ML: 5 INJECTION INTRAVENOUS at 08:48

## 2021-05-01 RX ADMIN — ALBUTEROL SULFATE 2 PUFF: 90 AEROSOL, METERED RESPIRATORY (INHALATION) at 07:30

## 2021-05-01 RX ADMIN — SODIUM CHLORIDE, PRESERVATIVE FREE 10 ML: 5 INJECTION INTRAVENOUS at 19:23

## 2021-05-01 RX ADMIN — DOXYCYCLINE 100 MG: 100 INJECTION, POWDER, LYOPHILIZED, FOR SOLUTION INTRAVENOUS at 10:07

## 2021-05-01 RX ADMIN — CETIRIZINE HYDROCHLORIDE 10 MG: 10 TABLET, FILM COATED ORAL at 08:48

## 2021-05-01 RX ADMIN — FUROSEMIDE 20 MG: 10 INJECTION, SOLUTION INTRAMUSCULAR; INTRAVENOUS at 08:47

## 2021-05-01 RX ADMIN — HYDROCODONE BITARTRATE AND ACETAMINOPHEN 1 TABLET: 7.5; 325 TABLET ORAL at 23:23

## 2021-05-01 RX ADMIN — ALBUTEROL SULFATE 2 PUFF: 90 AEROSOL, METERED RESPIRATORY (INHALATION) at 20:55

## 2021-05-02 LAB
ALBUMIN SERPL-MCNC: 3.5 G/DL (ref 3.5–5.2)
ALBUMIN/GLOB SERPL: 1.3 G/DL
ALP SERPL-CCNC: 88 U/L (ref 39–117)
ALT SERPL W P-5'-P-CCNC: 13 U/L (ref 1–33)
ANION GAP SERPL CALCULATED.3IONS-SCNC: 12 MMOL/L (ref 5–15)
AST SERPL-CCNC: 19 U/L (ref 1–32)
BASOPHILS # BLD AUTO: 0.03 10*3/MM3 (ref 0–0.2)
BASOPHILS NFR BLD AUTO: 0.6 % (ref 0–1.5)
BILIRUB CONJ SERPL-MCNC: <0.2 MG/DL (ref 0–0.3)
BILIRUB SERPL-MCNC: 0.3 MG/DL (ref 0–1.2)
BUN SERPL-MCNC: 34 MG/DL (ref 6–20)
BUN/CREAT SERPL: 45.9 (ref 7–25)
CALCIUM SPEC-SCNC: 9.1 MG/DL (ref 8.6–10.5)
CHLORIDE SERPL-SCNC: 100 MMOL/L (ref 98–107)
CK SERPL-CCNC: 27 U/L (ref 20–180)
CO2 SERPL-SCNC: 25 MMOL/L (ref 22–29)
CREAT SERPL-MCNC: 0.74 MG/DL (ref 0.57–1)
CRP SERPL-MCNC: 0.52 MG/DL (ref 0–0.5)
DEPRECATED RDW RBC AUTO: 39.5 FL (ref 37–54)
EOSINOPHIL # BLD AUTO: 0 10*3/MM3 (ref 0–0.4)
EOSINOPHIL NFR BLD AUTO: 0 % (ref 0.3–6.2)
ERYTHROCYTE [DISTWIDTH] IN BLOOD BY AUTOMATED COUNT: 13.2 % (ref 12.3–15.4)
FERRITIN SERPL-MCNC: 266.6 NG/ML (ref 13–150)
GFR SERPL CREATININE-BSD FRML MDRD: 81 ML/MIN/1.73
GLOBULIN UR ELPH-MCNC: 2.7 GM/DL
GLUCOSE SERPL-MCNC: 155 MG/DL (ref 65–99)
HCT VFR BLD AUTO: 39.4 % (ref 34–46.6)
HGB BLD-MCNC: 12.7 G/DL (ref 12–15.9)
IMM GRANULOCYTES # BLD AUTO: 0.14 10*3/MM3 (ref 0–0.05)
IMM GRANULOCYTES NFR BLD AUTO: 2.7 % (ref 0–0.5)
LYMPHOCYTES # BLD AUTO: 1.17 10*3/MM3 (ref 0.7–3.1)
LYMPHOCYTES NFR BLD AUTO: 22.2 % (ref 19.6–45.3)
MCH RBC QN AUTO: 26.4 PG (ref 26.6–33)
MCHC RBC AUTO-ENTMCNC: 32.2 G/DL (ref 31.5–35.7)
MCV RBC AUTO: 81.9 FL (ref 79–97)
MONOCYTES # BLD AUTO: 0.4 10*3/MM3 (ref 0.1–0.9)
MONOCYTES NFR BLD AUTO: 7.6 % (ref 5–12)
NEUTROPHILS NFR BLD AUTO: 3.52 10*3/MM3 (ref 1.7–7)
NEUTROPHILS NFR BLD AUTO: 66.9 % (ref 42.7–76)
NRBC BLD AUTO-RTO: 0.6 /100 WBC (ref 0–0.2)
PLATELET # BLD AUTO: 256 10*3/MM3 (ref 140–450)
PMV BLD AUTO: 11.2 FL (ref 6–12)
POTASSIUM SERPL-SCNC: 4.8 MMOL/L (ref 3.5–5.2)
PROT SERPL-MCNC: 6.2 G/DL (ref 6–8.5)
RBC # BLD AUTO: 4.81 10*6/MM3 (ref 3.77–5.28)
SODIUM SERPL-SCNC: 137 MMOL/L (ref 136–145)
WBC # BLD AUTO: 5.26 10*3/MM3 (ref 3.4–10.8)

## 2021-05-02 PROCEDURE — 82550 ASSAY OF CK (CPK): CPT | Performed by: FAMILY MEDICINE

## 2021-05-02 PROCEDURE — 86140 C-REACTIVE PROTEIN: CPT | Performed by: FAMILY MEDICINE

## 2021-05-02 PROCEDURE — 94799 UNLISTED PULMONARY SVC/PX: CPT

## 2021-05-02 PROCEDURE — 85025 COMPLETE CBC W/AUTO DIFF WBC: CPT | Performed by: FAMILY MEDICINE

## 2021-05-02 PROCEDURE — 25010000002 ENOXAPARIN PER 10 MG: Performed by: FAMILY MEDICINE

## 2021-05-02 PROCEDURE — 80053 COMPREHEN METABOLIC PANEL: CPT | Performed by: FAMILY MEDICINE

## 2021-05-02 PROCEDURE — 63710000001 DEXAMETHASONE PER 0.25 MG: Performed by: FAMILY MEDICINE

## 2021-05-02 PROCEDURE — 82248 BILIRUBIN DIRECT: CPT | Performed by: INTERNAL MEDICINE

## 2021-05-02 PROCEDURE — 82728 ASSAY OF FERRITIN: CPT | Performed by: FAMILY MEDICINE

## 2021-05-02 RX ADMIN — FAMOTIDINE 20 MG: 20 TABLET, FILM COATED ORAL at 20:12

## 2021-05-02 RX ADMIN — ACETAMINOPHEN 650 MG: 325 TABLET, FILM COATED ORAL at 20:12

## 2021-05-02 RX ADMIN — DEXAMETHASONE 6 MG: 2 TABLET ORAL at 08:51

## 2021-05-02 RX ADMIN — ALBUTEROL SULFATE 2 PUFF: 90 AEROSOL, METERED RESPIRATORY (INHALATION) at 19:10

## 2021-05-02 RX ADMIN — HYDROCODONE BITARTRATE AND ACETAMINOPHEN 1 TABLET: 7.5; 325 TABLET ORAL at 17:34

## 2021-05-02 RX ADMIN — GABAPENTIN 300 MG: 300 CAPSULE ORAL at 20:12

## 2021-05-02 RX ADMIN — HYDROCODONE BITARTRATE AND ACETAMINOPHEN 1 TABLET: 7.5; 325 TABLET ORAL at 21:50

## 2021-05-02 RX ADMIN — SODIUM CHLORIDE, PRESERVATIVE FREE 10 ML: 5 INJECTION INTRAVENOUS at 21:50

## 2021-05-02 RX ADMIN — MONTELUKAST SODIUM 10 MG: 10 TABLET, COATED ORAL at 20:12

## 2021-05-02 RX ADMIN — ALBUTEROL SULFATE 2 PUFF: 90 AEROSOL, METERED RESPIRATORY (INHALATION) at 07:50

## 2021-05-02 RX ADMIN — REMDESIVIR 100 MG: 100 INJECTION, POWDER, LYOPHILIZED, FOR SOLUTION INTRAVENOUS at 08:53

## 2021-05-02 RX ADMIN — DOXYCYCLINE 100 MG: 100 INJECTION, POWDER, LYOPHILIZED, FOR SOLUTION INTRAVENOUS at 23:56

## 2021-05-02 RX ADMIN — HYDROCODONE BITARTRATE AND ACETAMINOPHEN 1 TABLET: 7.5; 325 TABLET ORAL at 03:38

## 2021-05-02 RX ADMIN — SODIUM CHLORIDE, PRESERVATIVE FREE 10 ML: 5 INJECTION INTRAVENOUS at 23:56

## 2021-05-02 RX ADMIN — HYDROCODONE BITARTRATE AND ACETAMINOPHEN 1 TABLET: 7.5; 325 TABLET ORAL at 08:51

## 2021-05-02 RX ADMIN — CETIRIZINE HYDROCHLORIDE 10 MG: 10 TABLET, FILM COATED ORAL at 08:51

## 2021-05-02 RX ADMIN — HYDROCODONE BITARTRATE AND ACETAMINOPHEN 1 TABLET: 7.5; 325 TABLET ORAL at 13:38

## 2021-05-02 RX ADMIN — DOXYCYCLINE 100 MG: 100 INJECTION, POWDER, LYOPHILIZED, FOR SOLUTION INTRAVENOUS at 10:10

## 2021-05-02 RX ADMIN — SODIUM CHLORIDE, PRESERVATIVE FREE 10 ML: 5 INJECTION INTRAVENOUS at 08:52

## 2021-05-02 RX ADMIN — ENOXAPARIN SODIUM 40 MG: 40 INJECTION SUBCUTANEOUS at 17:34

## 2021-05-02 RX ADMIN — ALBUTEROL SULFATE 2 PUFF: 90 AEROSOL, METERED RESPIRATORY (INHALATION) at 15:56

## 2021-05-02 RX ADMIN — ALBUTEROL SULFATE 2 PUFF: 90 AEROSOL, METERED RESPIRATORY (INHALATION) at 11:39

## 2021-05-03 ENCOUNTER — READMISSION MANAGEMENT (OUTPATIENT)
Dept: CALL CENTER | Facility: HOSPITAL | Age: 56
End: 2021-05-03

## 2021-05-03 VITALS
SYSTOLIC BLOOD PRESSURE: 138 MMHG | HEIGHT: 60 IN | BODY MASS INDEX: 51.16 KG/M2 | HEART RATE: 68 BPM | OXYGEN SATURATION: 96 % | DIASTOLIC BLOOD PRESSURE: 77 MMHG | WEIGHT: 260.58 LBS | TEMPERATURE: 98.2 F | RESPIRATION RATE: 18 BRPM

## 2021-05-03 LAB
ALBUMIN SERPL-MCNC: 3.5 G/DL (ref 3.5–5.2)
ALBUMIN/GLOB SERPL: 1.2 G/DL
ALP SERPL-CCNC: 89 U/L (ref 39–117)
ALT SERPL W P-5'-P-CCNC: 15 U/L (ref 1–33)
ANION GAP SERPL CALCULATED.3IONS-SCNC: 9 MMOL/L (ref 5–15)
AST SERPL-CCNC: 19 U/L (ref 1–32)
BACTERIA SPEC AEROBE CULT: NORMAL
BASOPHILS # BLD AUTO: 0.02 10*3/MM3 (ref 0–0.2)
BASOPHILS NFR BLD AUTO: 0.3 % (ref 0–1.5)
BILIRUB CONJ SERPL-MCNC: <0.2 MG/DL (ref 0–0.3)
BILIRUB SERPL-MCNC: 0.3 MG/DL (ref 0–1.2)
BUN SERPL-MCNC: 23 MG/DL (ref 6–20)
BUN/CREAT SERPL: 36.5 (ref 7–25)
CALCIUM SPEC-SCNC: 9.2 MG/DL (ref 8.6–10.5)
CHLORIDE SERPL-SCNC: 101 MMOL/L (ref 98–107)
CK SERPL-CCNC: 15 U/L (ref 20–180)
CO2 SERPL-SCNC: 27 MMOL/L (ref 22–29)
CREAT SERPL-MCNC: 0.63 MG/DL (ref 0.57–1)
CRP SERPL-MCNC: 0.3 MG/DL (ref 0–0.5)
D-DIMER, QUANTITATIVE (MAD,POW, STR): <270 NG/ML (FEU) (ref 0–470)
DEPRECATED RDW RBC AUTO: 38.1 FL (ref 37–54)
EOSINOPHIL # BLD AUTO: 0 10*3/MM3 (ref 0–0.4)
EOSINOPHIL NFR BLD AUTO: 0 % (ref 0.3–6.2)
ERYTHROCYTE [DISTWIDTH] IN BLOOD BY AUTOMATED COUNT: 13.3 % (ref 12.3–15.4)
FERRITIN SERPL-MCNC: 250.2 NG/ML (ref 13–150)
FIBRINOGEN PPP-MCNC: 414 MG/DL (ref 228–514)
GFR SERPL CREATININE-BSD FRML MDRD: 98 ML/MIN/1.73
GLOBULIN UR ELPH-MCNC: 2.9 GM/DL
GLUCOSE SERPL-MCNC: 142 MG/DL (ref 65–99)
HCT VFR BLD AUTO: 39.5 % (ref 34–46.6)
HGB BLD-MCNC: 13.3 G/DL (ref 12–15.9)
IMM GRANULOCYTES # BLD AUTO: 0.37 10*3/MM3 (ref 0–0.05)
IMM GRANULOCYTES NFR BLD AUTO: 5.4 % (ref 0–0.5)
LDH SERPL-CCNC: 146 U/L (ref 135–214)
LYMPHOCYTES # BLD AUTO: 1.4 10*3/MM3 (ref 0.7–3.1)
LYMPHOCYTES NFR BLD AUTO: 20.4 % (ref 19.6–45.3)
MCH RBC QN AUTO: 27.1 PG (ref 26.6–33)
MCHC RBC AUTO-ENTMCNC: 33.7 G/DL (ref 31.5–35.7)
MCV RBC AUTO: 80.6 FL (ref 79–97)
MONOCYTES # BLD AUTO: 0.5 10*3/MM3 (ref 0.1–0.9)
MONOCYTES NFR BLD AUTO: 7.3 % (ref 5–12)
NEUTROPHILS NFR BLD AUTO: 4.57 10*3/MM3 (ref 1.7–7)
NEUTROPHILS NFR BLD AUTO: 66.6 % (ref 42.7–76)
NRBC BLD AUTO-RTO: 0 /100 WBC (ref 0–0.2)
PLATELET # BLD AUTO: 228 10*3/MM3 (ref 140–450)
PMV BLD AUTO: 10.4 FL (ref 6–12)
POTASSIUM SERPL-SCNC: 4.1 MMOL/L (ref 3.5–5.2)
PROT SERPL-MCNC: 6.4 G/DL (ref 6–8.5)
RBC # BLD AUTO: 4.9 10*6/MM3 (ref 3.77–5.28)
SODIUM SERPL-SCNC: 137 MMOL/L (ref 136–145)
WBC # BLD AUTO: 6.86 10*3/MM3 (ref 3.4–10.8)

## 2021-05-03 PROCEDURE — 94799 UNLISTED PULMONARY SVC/PX: CPT

## 2021-05-03 PROCEDURE — 63710000001 DEXAMETHASONE PER 0.25 MG: Performed by: FAMILY MEDICINE

## 2021-05-03 PROCEDURE — 85025 COMPLETE CBC W/AUTO DIFF WBC: CPT | Performed by: FAMILY MEDICINE

## 2021-05-03 PROCEDURE — 85384 FIBRINOGEN ACTIVITY: CPT | Performed by: FAMILY MEDICINE

## 2021-05-03 PROCEDURE — 86140 C-REACTIVE PROTEIN: CPT | Performed by: FAMILY MEDICINE

## 2021-05-03 PROCEDURE — 82248 BILIRUBIN DIRECT: CPT | Performed by: INTERNAL MEDICINE

## 2021-05-03 PROCEDURE — 82550 ASSAY OF CK (CPK): CPT | Performed by: FAMILY MEDICINE

## 2021-05-03 PROCEDURE — 83615 LACTATE (LD) (LDH) ENZYME: CPT | Performed by: FAMILY MEDICINE

## 2021-05-03 PROCEDURE — 94760 N-INVAS EAR/PLS OXIMETRY 1: CPT

## 2021-05-03 PROCEDURE — 82728 ASSAY OF FERRITIN: CPT | Performed by: FAMILY MEDICINE

## 2021-05-03 PROCEDURE — 80053 COMPREHEN METABOLIC PANEL: CPT | Performed by: FAMILY MEDICINE

## 2021-05-03 PROCEDURE — 85379 FIBRIN DEGRADATION QUANT: CPT | Performed by: FAMILY MEDICINE

## 2021-05-03 RX ORDER — VALSARTAN 320 MG/1
160 TABLET ORAL DAILY
Qty: 30 TABLET | Refills: 2 | Status: SHIPPED | OUTPATIENT
Start: 2021-05-03 | End: 2022-09-07

## 2021-05-03 RX ORDER — DOXYCYCLINE HYCLATE 100 MG/1
100 TABLET, DELAYED RELEASE ORAL 2 TIMES DAILY
Qty: 10 TABLET | Refills: 0 | Status: SHIPPED | OUTPATIENT
Start: 2021-05-03 | End: 2021-05-08

## 2021-05-03 RX ADMIN — CETIRIZINE HYDROCHLORIDE 10 MG: 10 TABLET, FILM COATED ORAL at 09:01

## 2021-05-03 RX ADMIN — ALBUTEROL SULFATE 2 PUFF: 90 AEROSOL, METERED RESPIRATORY (INHALATION) at 07:01

## 2021-05-03 RX ADMIN — REMDESIVIR 100 MG: 100 INJECTION, POWDER, LYOPHILIZED, FOR SOLUTION INTRAVENOUS at 09:02

## 2021-05-03 RX ADMIN — ALBUTEROL SULFATE 2 PUFF: 90 AEROSOL, METERED RESPIRATORY (INHALATION) at 11:05

## 2021-05-03 RX ADMIN — SODIUM CHLORIDE, PRESERVATIVE FREE 10 ML: 5 INJECTION INTRAVENOUS at 09:01

## 2021-05-03 RX ADMIN — HYDROCODONE BITARTRATE AND ACETAMINOPHEN 1 TABLET: 7.5; 325 TABLET ORAL at 07:46

## 2021-05-03 RX ADMIN — DEXAMETHASONE 6 MG: 2 TABLET ORAL at 09:00

## 2021-05-03 RX ADMIN — HYDROCODONE BITARTRATE AND ACETAMINOPHEN 1 TABLET: 7.5; 325 TABLET ORAL at 02:54

## 2021-05-04 ENCOUNTER — READMISSION MANAGEMENT (OUTPATIENT)
Dept: CALL CENTER | Facility: HOSPITAL | Age: 56
End: 2021-05-04

## 2021-05-04 LAB — BACTERIA SPEC AEROBE CULT: NORMAL

## 2021-05-04 NOTE — OUTREACH NOTE
Prep Survey      Responses   [facility] patient discharged from?  [place]   Is LACE score < 7 ?  No   Emergency Room discharge w/ pulse ox?  No   Eligibility  Readm Mgmt   Discharge diagnosis  acute hypoxic resp failure d/t COVID-19 PNA   Does the patient have one of the following disease processes/diagnoses(primary or secondary)?  COVID-19   Does the patient have Home health ordered?  No   Is there a DME ordered?  No   Prep survey completed?  Yes          Hope Marcial RN

## 2021-05-04 NOTE — OUTREACH NOTE
COVID-19 Week 1 Survey      Responses   [facility] patient discharged from?  [place]   Does the patient have one of the following disease processes/diagnoses(primary or secondary)?  COVID-19   COVID-19 underlying condition?  None   Call Number  Call 1   Week 1 Call successful?  Yes   Call start time  1439   Call end time  1447   Discharge diagnosis  acute hypoxic resp failure d/t COVID-19 PNA   Meds reviewed with patient/caregiver?  Yes   Is the patient having any side effects they believe may be caused by any medication additions or changes?  No   Does the patient have all medications ordered at discharge?  No [daughter will  today, 5/4/21]   Nursing Interventions  No intervention needed   Is the patient taking all medications as directed (includes completed medication regime)?  Yes   Does the patient have a primary care provider?   Yes   Does the patient have an appointment with their PCP or specialist within 7 days of discharge?  Yes   Has the patient kept scheduled appointments due by today?  N/A [video]   What is the Home health agency?   none   Has home health visited the patient within 72 hours of discharge?  N/A   Psychosocial issues?  No   Comments   just came home from hospital with COVID on 5/3/21, a few hours after pt   Did the patient receive a copy of their discharge instructions?  Yes   Did the patient receive a copy of COVID-19 specific instructions?  Yes   Nursing interventions  Reviewed instructions with patient [instructed on discarding toothbrush, lip gloss, etc]   What is the patient's perception of their health status since discharge?  Improving   Does the patient have any of the following symptoms?  Shortness of breath [SOB on exertion]   Nursing Interventions  Nurse provided patient education   Pulse Ox monitoring  None   Is the patient/caregiver able to teach back steps to recovery at home?  Set small, achievable goals for return to baseline health, Rest and rebuild  strength, gradually increase activity, Eat a well-balance diet [using Respirex]   Is the patient/caregiver able to teach back the hierarchy of who to call/visit for symptoms/problems? PCP, Specialist, Home health nurse, Urgent Care, ED, 911  Yes   COVID-19 call completed?  Yes          Sada Salas RN

## 2021-05-05 ENCOUNTER — READMISSION MANAGEMENT (OUTPATIENT)
Dept: CALL CENTER | Facility: HOSPITAL | Age: 56
End: 2021-05-05

## 2021-05-05 NOTE — OUTREACH NOTE
COVID-19 Week 1 Survey      Responses   [facility] patient discharged from?  [place]   Does the patient have one of the following disease processes/diagnoses(primary or secondary)?  COVID-19   COVID-19 underlying condition?  None   Call Number  Call 2   Week 1 Call successful?  Yes   Call start time  1019   Call end time  1020   Is patient permission given to speak with other caregiver?  Yes   List who call center can speak with  spouse-Gigi   Person spoke with today (if not patient) and relationship  spouse-Gigi   Meds reviewed with patient/caregiver?  Yes   Is the patient having any side effects they believe may be caused by any medication additions or changes?  No   Does the patient have all medications ordered at discharge?  Yes   Is the patient taking all medications as directed (includes completed medication regime)?  Yes   Does the patient have a primary care provider?   Yes   Does the patient have an appointment with their PCP or specialist within 7 days of discharge?  Yes   Has the patient kept scheduled appointments due by today?  N/A   Has home health visited the patient within 72 hours of discharge?  N/A   Psychosocial issues?  No   What is the patient's perception of their health status since discharge?  Improving   Does the patient have any of the following symptoms?  Shortness of breath   Nursing Interventions  Nurse provided patient education   Pulse Ox monitoring  None   COVID-19 call completed?  Yes   Wrap up additional comments   states patient is slightly improved-still has slight SOA. Denies any fever, cough, or chest pain. Denies any needs today.          Kiesha Del Valle RN

## 2021-05-06 ENCOUNTER — READMISSION MANAGEMENT (OUTPATIENT)
Dept: CALL CENTER | Facility: HOSPITAL | Age: 56
End: 2021-05-06

## 2021-05-06 NOTE — OUTREACH NOTE
COVID-19 Week 1 Survey      Responses   [facility] patient discharged from?  [place]   Does the patient have one of the following disease processes/diagnoses(primary or secondary)?  COVID-19   COVID-19 underlying condition?  None   Call Number  Call 3   Week 1 Call successful?  Yes   Call start time  0926   Call end time  0928   Discharge diagnosis  acute hypoxic resp failure d/t COVID-19 PNA   Meds reviewed with patient/caregiver?  Yes   Is the patient having any side effects they believe may be caused by any medication additions or changes?  No   Does the patient have all medications ordered at discharge?  Yes   Is the patient taking all medications as directed (includes completed medication regime)?  Yes   Does the patient have a primary care provider?   Yes   Comments regarding PCP  Dr. Metzger at 1pm 05/07/21   Does the patient have an appointment with their PCP or specialist within 7 days of discharge?  Yes   Has the patient kept scheduled appointments due by today?  N/A   What is the Home health agency?   none   Has home health visited the patient within 72 hours of discharge?  N/A   Did the patient receive a copy of their discharge instructions?  Yes   Did the patient receive a copy of COVID-19 specific instructions?  Yes   Nursing interventions  Reviewed instructions with patient   What is the patient's perception of their health status since discharge?  Improving [still weak and winded]   Does the patient have any of the following symptoms?  Shortness of breath   Nursing Interventions  Nurse provided patient education   Pulse Ox monitoring  None   Is the patient/caregiver able to teach back steps to recovery at home?  Set small, achievable goals for return to baseline health, Rest and rebuild strength, gradually increase activity, Eat a well-balance diet   If the patient is a current smoker, are they able to teach back resources for cessation?  Not a smoker   Is the patient/caregiver able to  teach back the hierarchy of who to call/visit for symptoms/problems? PCP, Specialist, Home health nurse, Urgent Care, ED, 911  Yes   COVID-19 call completed?  Yes   Wrap up additional comments  States she is still a little winded but otherwise is doing well.           Haven Barriga RN

## 2021-05-10 LAB
QT INTERVAL: 340 MS
QTC INTERVAL: 397 MS

## 2021-05-12 ENCOUNTER — READMISSION MANAGEMENT (OUTPATIENT)
Dept: CALL CENTER | Facility: HOSPITAL | Age: 56
End: 2021-05-12

## 2021-05-12 NOTE — OUTREACH NOTE
COVID-19 Week 2 Survey      Responses   [facility] patient discharged from?  [place]   Does the patient have one of the following disease processes/diagnoses(primary or secondary)?  COVID-19   COVID-19 underlying condition?  None   Call Number  Call 1   COVID-19 Week 2: Call 1 attempt successful?  Yes   Call start time  0945   Call end time  0947   Discharge diagnosis  covid    Does the patient have a primary care provider?   Yes   Comments  video appointment on Friday   What is the patient's perception of their health status since discharge?  Improving [back to normal]   Pulse Ox monitoring  None   COVID-19 call completed?  Yes   Wrap up additional comments  patient stated she is back to normal          Leigha Pinto RN

## 2021-05-15 ENCOUNTER — READMISSION MANAGEMENT (OUTPATIENT)
Dept: CALL CENTER | Facility: HOSPITAL | Age: 56
End: 2021-05-15

## 2021-05-15 NOTE — OUTREACH NOTE
COVID-19 Week 2 Survey      Responses   [facility] patient discharged from?  [place]   Does the patient have one of the following disease processes/diagnoses(primary or secondary)?  COVID-19   COVID-19 underlying condition?  None   Call Number  Call 2   COVID-19 Week 2: Call 1 attempt successful?  Yes   Call start time  1025   Call end time  1027   Meds reviewed with patient/caregiver?  Yes   Is the patient having any side effects they believe may be caused by any medication additions or changes?  No   Does the patient have all medications ordered at discharge?  Yes   Is the patient taking all medications as directed (includes completed medication regime)?  Yes   Does the patient have a primary care provider?   Yes   Does the patient have an appointment with their PCP or specialist within 7 days of discharge?  Yes   Has the patient kept scheduled appointments due by today?  Yes [virtual]   Has home health visited the patient within 72 hours of discharge?  N/A   Psychosocial issues?  No   Did the patient receive a copy of their discharge instructions?  Yes   Did the patient receive a copy of COVID-19 specific instructions?  Yes   Nursing interventions  Reviewed instructions with patient   What is the patient's perception of their health status since discharge?  Improving   Does the patient have any of the following symptoms?  None   Nursing Interventions  Nurse provided patient education   Pulse Ox monitoring  None   Is the patient/caregiver able to teach back steps to recovery at home?  Set small, achievable goals for return to baseline health, Rest and rebuild strength, gradually increase activity, Eat a well-balance diet   Is the patient/caregiver able to teach back the hierarchy of who to call/visit for symptoms/problems? PCP, Specialist, Home health nurse, Urgent Care, ED, 911  Yes   COVID-19 call completed?  Yes          Sada Salas RN

## 2021-05-22 ENCOUNTER — READMISSION MANAGEMENT (OUTPATIENT)
Dept: CALL CENTER | Facility: HOSPITAL | Age: 56
End: 2021-05-22

## 2021-05-22 NOTE — OUTREACH NOTE
COVID-19 Week 3 Survey      Responses   [facility] patient discharged from?  [place]   Does the patient have one of the following disease processes/diagnoses(primary or secondary)?  COVID-19   COVID-19 underlying condition?  None   Call Number  Call 1   COVID-19 Week 3: Call 1 attempt successful?  Yes   Call start time  1056   Call end time  1058   Discharge diagnosis  covid    Meds reviewed with patient/caregiver?  Yes   Is the patient taking all medications as directed (includes completed medication regime)?  Yes   Comments regarding appointments  Pain management appt is on 5/12/21   Has the patient kept scheduled appointments due by today?  Yes   What is the patient's perception of their health status since discharge?  Returned to baseline/stable   Does the patient have any of the following symptoms?  None   Pulse Ox monitoring  None   Is the patient/caregiver able to teach back steps to recovery at home?  Rest and rebuild strength, gradually increase activity, Eat a well-balance diet   COVID-19 call completed?  Yes   Revoked  No further contact(revokes)-requires comment   Is the patient interested in additional calls from an ambulatory ?  NOTE:  applies to high risk patients requiring additional follow-up.  No   Graduated/Revoked comments  Pt reports she's back to baseline and no more calls are needed. She appreciates the calls.           Kenia Carney RN

## 2021-08-10 ENCOUNTER — HOSPITAL ENCOUNTER (EMERGENCY)
Facility: HOSPITAL | Age: 56
Discharge: LEFT BEFORE DISCHARGE | End: 2021-08-10

## 2021-08-10 PROCEDURE — 99211 OFF/OP EST MAY X REQ PHY/QHP: CPT

## 2021-12-01 ENCOUNTER — TRANSCRIBE ORDERS (OUTPATIENT)
Dept: PODIATRY | Facility: CLINIC | Age: 56
End: 2021-12-01

## 2021-12-01 DIAGNOSIS — L97.512 ULCER OF TOE OF RIGHT FOOT, WITH FAT LAYER EXPOSED (HCC): Primary | ICD-10-CM

## 2022-09-07 ENCOUNTER — APPOINTMENT (OUTPATIENT)
Dept: GENERAL RADIOLOGY | Facility: HOSPITAL | Age: 57
End: 2022-09-07

## 2022-09-07 ENCOUNTER — APPOINTMENT (OUTPATIENT)
Dept: CT IMAGING | Facility: HOSPITAL | Age: 57
End: 2022-09-07

## 2022-09-07 ENCOUNTER — APPOINTMENT (OUTPATIENT)
Dept: ULTRASOUND IMAGING | Facility: HOSPITAL | Age: 57
End: 2022-09-07

## 2022-09-07 ENCOUNTER — HOSPITAL ENCOUNTER (INPATIENT)
Facility: HOSPITAL | Age: 57
LOS: 1 days | Discharge: HOME | End: 2022-09-11
Attending: STUDENT IN AN ORGANIZED HEALTH CARE EDUCATION/TRAINING PROGRAM | Admitting: INTERNAL MEDICINE

## 2022-09-07 DIAGNOSIS — Z74.09 IMPAIRED FUNCTIONAL MOBILITY AND ACTIVITY TOLERANCE: ICD-10-CM

## 2022-09-07 DIAGNOSIS — I50.9 CONGESTIVE HEART FAILURE, UNSPECIFIED HF CHRONICITY, UNSPECIFIED HEART FAILURE TYPE: Primary | ICD-10-CM

## 2022-09-07 DIAGNOSIS — I35.0 NONRHEUMATIC AORTIC VALVE STENOSIS: ICD-10-CM

## 2022-09-07 DIAGNOSIS — I10 BENIGN HYPERTENSION: ICD-10-CM

## 2022-09-07 DIAGNOSIS — I50.9 HEART FAILURE, UNSPECIFIED HF CHRONICITY, UNSPECIFIED HEART FAILURE TYPE: ICD-10-CM

## 2022-09-07 DIAGNOSIS — R06.09 DYSPNEA ON EXERTION: ICD-10-CM

## 2022-09-07 LAB
ALBUMIN SERPL-MCNC: 4.1 G/DL (ref 3.5–5.2)
ALBUMIN/GLOB SERPL: 1.3 G/DL
ALP SERPL-CCNC: 111 U/L (ref 39–117)
ALT SERPL W P-5'-P-CCNC: 12 U/L (ref 1–33)
ANION GAP SERPL CALCULATED.3IONS-SCNC: 9 MMOL/L (ref 5–15)
AST SERPL-CCNC: 19 U/L (ref 1–32)
BACTERIA UR QL AUTO: ABNORMAL /HPF
BASOPHILS # BLD AUTO: 0.03 10*3/MM3 (ref 0–0.2)
BASOPHILS NFR BLD AUTO: 0.4 % (ref 0–1.5)
BILIRUB SERPL-MCNC: 0.6 MG/DL (ref 0–1.2)
BILIRUB UR QL STRIP: NEGATIVE
BUN SERPL-MCNC: 7 MG/DL (ref 6–20)
BUN/CREAT SERPL: 11.1 (ref 7–25)
CALCIUM SPEC-SCNC: 9.5 MG/DL (ref 8.6–10.5)
CHLORIDE SERPL-SCNC: 103 MMOL/L (ref 98–107)
CLARITY UR: CLEAR
CO2 SERPL-SCNC: 29 MMOL/L (ref 22–29)
COLOR UR: YELLOW
CREAT SERPL-MCNC: 0.63 MG/DL (ref 0.57–1)
D-DIMER, QUANTITATIVE (MAD,POW, STR): 830 NG/ML (FEU) (ref 0–470)
D-LACTATE SERPL-SCNC: 2.1 MMOL/L (ref 0.5–2)
DEPRECATED RDW RBC AUTO: 37.4 FL (ref 37–54)
EGFRCR SERPLBLD CKD-EPI 2021: 103.6 ML/MIN/1.73
EOSINOPHIL # BLD AUTO: 0.04 10*3/MM3 (ref 0–0.4)
EOSINOPHIL NFR BLD AUTO: 0.5 % (ref 0.3–6.2)
ERYTHROCYTE [DISTWIDTH] IN BLOOD BY AUTOMATED COUNT: 13.2 % (ref 12.3–15.4)
FLUAV SUBTYP SPEC NAA+PROBE: NOT DETECTED
FLUBV RNA ISLT QL NAA+PROBE: NOT DETECTED
GLOBULIN UR ELPH-MCNC: 3.2 GM/DL
GLUCOSE SERPL-MCNC: 97 MG/DL (ref 65–99)
GLUCOSE UR STRIP-MCNC: NEGATIVE MG/DL
HCT VFR BLD AUTO: 38.9 % (ref 34–46.6)
HGB BLD-MCNC: 13.7 G/DL (ref 12–15.9)
HGB UR QL STRIP.AUTO: ABNORMAL
HYALINE CASTS UR QL AUTO: ABNORMAL /LPF
IMM GRANULOCYTES # BLD AUTO: 0.06 10*3/MM3 (ref 0–0.05)
IMM GRANULOCYTES NFR BLD AUTO: 0.8 % (ref 0–0.5)
KETONES UR QL STRIP: NEGATIVE
LEUKOCYTE ESTERASE UR QL STRIP.AUTO: NEGATIVE
LYMPHOCYTES # BLD AUTO: 1.75 10*3/MM3 (ref 0.7–3.1)
LYMPHOCYTES NFR BLD AUTO: 22.6 % (ref 19.6–45.3)
MAGNESIUM SERPL-MCNC: 1.8 MG/DL (ref 1.6–2.6)
MCH RBC QN AUTO: 27.5 PG (ref 26.6–33)
MCHC RBC AUTO-ENTMCNC: 35.2 G/DL (ref 31.5–35.7)
MCV RBC AUTO: 78.1 FL (ref 79–97)
MONOCYTES # BLD AUTO: 0.47 10*3/MM3 (ref 0.1–0.9)
MONOCYTES NFR BLD AUTO: 6.1 % (ref 5–12)
NEUTROPHILS NFR BLD AUTO: 5.4 10*3/MM3 (ref 1.7–7)
NEUTROPHILS NFR BLD AUTO: 69.6 % (ref 42.7–76)
NITRITE UR QL STRIP: NEGATIVE
NRBC BLD AUTO-RTO: 0 /100 WBC (ref 0–0.2)
NT-PROBNP SERPL-MCNC: 586 PG/ML (ref 0–900)
PH UR STRIP.AUTO: 7 [PH] (ref 5–9)
PLATELET # BLD AUTO: 174 10*3/MM3 (ref 140–450)
PMV BLD AUTO: 9.7 FL (ref 6–12)
POTASSIUM SERPL-SCNC: 3.2 MMOL/L (ref 3.5–5.2)
PROT SERPL-MCNC: 7.3 G/DL (ref 6–8.5)
PROT UR QL STRIP: NEGATIVE
RBC # BLD AUTO: 4.98 10*6/MM3 (ref 3.77–5.28)
RBC # UR STRIP: ABNORMAL /HPF
REF LAB TEST METHOD: ABNORMAL
SARS-COV-2 RNA PNL SPEC NAA+PROBE: NOT DETECTED
SODIUM SERPL-SCNC: 141 MMOL/L (ref 136–145)
SP GR UR STRIP: 1 (ref 1–1.03)
SQUAMOUS #/AREA URNS HPF: ABNORMAL /HPF
TROPONIN T SERPL-MCNC: <0.01 NG/ML (ref 0–0.03)
UROBILINOGEN UR QL STRIP: ABNORMAL
WBC # UR STRIP: ABNORMAL /HPF
WBC NRBC COR # BLD: 7.75 10*3/MM3 (ref 3.4–10.8)

## 2022-09-07 PROCEDURE — 99285 EMERGENCY DEPT VISIT HI MDM: CPT

## 2022-09-07 PROCEDURE — 93005 ELECTROCARDIOGRAM TRACING: CPT | Performed by: STUDENT IN AN ORGANIZED HEALTH CARE EDUCATION/TRAINING PROGRAM

## 2022-09-07 PROCEDURE — 83880 ASSAY OF NATRIURETIC PEPTIDE: CPT | Performed by: STUDENT IN AN ORGANIZED HEALTH CARE EDUCATION/TRAINING PROGRAM

## 2022-09-07 PROCEDURE — 81001 URINALYSIS AUTO W/SCOPE: CPT | Performed by: STUDENT IN AN ORGANIZED HEALTH CARE EDUCATION/TRAINING PROGRAM

## 2022-09-07 PROCEDURE — 83735 ASSAY OF MAGNESIUM: CPT | Performed by: STUDENT IN AN ORGANIZED HEALTH CARE EDUCATION/TRAINING PROGRAM

## 2022-09-07 PROCEDURE — 71045 X-RAY EXAM CHEST 1 VIEW: CPT

## 2022-09-07 PROCEDURE — G0378 HOSPITAL OBSERVATION PER HR: HCPCS

## 2022-09-07 PROCEDURE — 94640 AIRWAY INHALATION TREATMENT: CPT

## 2022-09-07 PROCEDURE — 87040 BLOOD CULTURE FOR BACTERIA: CPT | Performed by: INTERNAL MEDICINE

## 2022-09-07 PROCEDURE — 87636 SARSCOV2 & INF A&B AMP PRB: CPT | Performed by: STUDENT IN AN ORGANIZED HEALTH CARE EDUCATION/TRAINING PROGRAM

## 2022-09-07 PROCEDURE — 85025 COMPLETE CBC W/AUTO DIFF WBC: CPT | Performed by: STUDENT IN AN ORGANIZED HEALTH CARE EDUCATION/TRAINING PROGRAM

## 2022-09-07 PROCEDURE — 0 IOPAMIDOL PER 1 ML: Performed by: STUDENT IN AN ORGANIZED HEALTH CARE EDUCATION/TRAINING PROGRAM

## 2022-09-07 PROCEDURE — 94664 DEMO&/EVAL PT USE INHALER: CPT

## 2022-09-07 PROCEDURE — 25010000002 CEFTRIAXONE PER 250 MG: Performed by: INTERNAL MEDICINE

## 2022-09-07 PROCEDURE — 94761 N-INVAS EAR/PLS OXIMETRY MLT: CPT

## 2022-09-07 PROCEDURE — 93010 ELECTROCARDIOGRAM REPORT: CPT | Performed by: INTERNAL MEDICINE

## 2022-09-07 PROCEDURE — 93970 EXTREMITY STUDY: CPT

## 2022-09-07 PROCEDURE — 83605 ASSAY OF LACTIC ACID: CPT | Performed by: INTERNAL MEDICINE

## 2022-09-07 PROCEDURE — 25010000002 ENOXAPARIN PER 10 MG: Performed by: INTERNAL MEDICINE

## 2022-09-07 PROCEDURE — 36415 COLL VENOUS BLD VENIPUNCTURE: CPT | Performed by: INTERNAL MEDICINE

## 2022-09-07 PROCEDURE — 80053 COMPREHEN METABOLIC PANEL: CPT | Performed by: STUDENT IN AN ORGANIZED HEALTH CARE EDUCATION/TRAINING PROGRAM

## 2022-09-07 PROCEDURE — 84484 ASSAY OF TROPONIN QUANT: CPT | Performed by: INTERNAL MEDICINE

## 2022-09-07 PROCEDURE — 25010000002 HYDROMORPHONE 1 MG/ML SOLUTION: Performed by: STUDENT IN AN ORGANIZED HEALTH CARE EDUCATION/TRAINING PROGRAM

## 2022-09-07 PROCEDURE — 84484 ASSAY OF TROPONIN QUANT: CPT | Performed by: STUDENT IN AN ORGANIZED HEALTH CARE EDUCATION/TRAINING PROGRAM

## 2022-09-07 PROCEDURE — 25010000002 FUROSEMIDE PER 20 MG: Performed by: STUDENT IN AN ORGANIZED HEALTH CARE EDUCATION/TRAINING PROGRAM

## 2022-09-07 PROCEDURE — 71275 CT ANGIOGRAPHY CHEST: CPT

## 2022-09-07 PROCEDURE — 85379 FIBRIN DEGRADATION QUANT: CPT | Performed by: STUDENT IN AN ORGANIZED HEALTH CARE EDUCATION/TRAINING PROGRAM

## 2022-09-07 RX ORDER — ROSUVASTATIN CALCIUM 5 MG/1
5 TABLET, COATED ORAL 3 TIMES WEEKLY
Status: DISCONTINUED | OUTPATIENT
Start: 2022-09-09 | End: 2022-09-11 | Stop reason: HOSPADM

## 2022-09-07 RX ORDER — FLUTICASONE PROPIONATE 50 MCG
1 SPRAY, SUSPENSION (ML) NASAL AS NEEDED
Status: DISCONTINUED | OUTPATIENT
Start: 2022-09-07 | End: 2022-09-11 | Stop reason: HOSPADM

## 2022-09-07 RX ORDER — FUROSEMIDE 10 MG/ML
40 INJECTION INTRAMUSCULAR; INTRAVENOUS DAILY
Status: DISCONTINUED | OUTPATIENT
Start: 2022-09-08 | End: 2022-09-07

## 2022-09-07 RX ORDER — POTASSIUM CHLORIDE 750 MG/1
40 CAPSULE, EXTENDED RELEASE ORAL AS NEEDED
Status: DISCONTINUED | OUTPATIENT
Start: 2022-09-07 | End: 2022-09-11 | Stop reason: HOSPADM

## 2022-09-07 RX ORDER — IPRATROPIUM BROMIDE AND ALBUTEROL SULFATE 2.5; .5 MG/3ML; MG/3ML
3 SOLUTION RESPIRATORY (INHALATION)
Status: DISCONTINUED | OUTPATIENT
Start: 2022-09-07 | End: 2022-09-08

## 2022-09-07 RX ORDER — GABAPENTIN 300 MG/1
300 CAPSULE ORAL 3 TIMES DAILY
Status: DISCONTINUED | OUTPATIENT
Start: 2022-09-07 | End: 2022-09-07

## 2022-09-07 RX ORDER — SODIUM CHLORIDE 0.9 % (FLUSH) 0.9 %
10 SYRINGE (ML) INJECTION EVERY 12 HOURS SCHEDULED
Status: DISCONTINUED | OUTPATIENT
Start: 2022-09-07 | End: 2022-09-11 | Stop reason: HOSPADM

## 2022-09-07 RX ORDER — HYDROCODONE BITARTRATE AND ACETAMINOPHEN 7.5; 325 MG/1; MG/1
1 TABLET ORAL
COMMUNITY
Start: 2022-07-29

## 2022-09-07 RX ORDER — SODIUM CHLORIDE 0.9 % (FLUSH) 0.9 %
10 SYRINGE (ML) INJECTION AS NEEDED
Status: DISCONTINUED | OUTPATIENT
Start: 2022-09-07 | End: 2022-09-11 | Stop reason: HOSPADM

## 2022-09-07 RX ORDER — FUROSEMIDE 10 MG/ML
40 INJECTION INTRAMUSCULAR; INTRAVENOUS ONCE
Status: COMPLETED | OUTPATIENT
Start: 2022-09-07 | End: 2022-09-07

## 2022-09-07 RX ORDER — POTASSIUM CHLORIDE 1.5 G/1.77G
40 POWDER, FOR SOLUTION ORAL AS NEEDED
Status: DISCONTINUED | OUTPATIENT
Start: 2022-09-07 | End: 2022-09-11 | Stop reason: HOSPADM

## 2022-09-07 RX ORDER — VALSARTAN 160 MG/1
320 TABLET ORAL DAILY
Status: DISCONTINUED | OUTPATIENT
Start: 2022-09-08 | End: 2022-09-11 | Stop reason: HOSPADM

## 2022-09-07 RX ORDER — POTASSIUM CHLORIDE 750 MG/1
40 CAPSULE, EXTENDED RELEASE ORAL ONCE
Status: COMPLETED | OUTPATIENT
Start: 2022-09-07 | End: 2022-09-07

## 2022-09-07 RX ORDER — VALSARTAN 160 MG/1
160 TABLET ORAL DAILY
Status: DISCONTINUED | OUTPATIENT
Start: 2022-09-07 | End: 2022-09-07

## 2022-09-07 RX ORDER — HYDROCODONE BITARTRATE AND ACETAMINOPHEN 7.5; 325 MG/1; MG/1
1 TABLET ORAL EVERY 6 HOURS PRN
Status: DISCONTINUED | OUTPATIENT
Start: 2022-09-07 | End: 2022-09-08

## 2022-09-07 RX ORDER — ROSUVASTATIN CALCIUM 5 MG/1
5 TABLET, COATED ORAL 3 TIMES WEEKLY
COMMUNITY
Start: 2022-05-20 | End: 2023-05-21

## 2022-09-07 RX ORDER — HYDROCODONE BITARTRATE AND ACETAMINOPHEN 7.5; 325 MG/1; MG/1
1 TABLET ORAL EVERY 6 HOURS PRN
Status: DISCONTINUED | OUTPATIENT
Start: 2022-09-07 | End: 2022-09-07

## 2022-09-07 RX ORDER — LABETALOL HYDROCHLORIDE 5 MG/ML
10 INJECTION, SOLUTION INTRAVENOUS EVERY 6 HOURS PRN
Status: DISCONTINUED | OUTPATIENT
Start: 2022-09-07 | End: 2022-09-11 | Stop reason: HOSPADM

## 2022-09-07 RX ORDER — HYDROCODONE BITARTRATE AND ACETAMINOPHEN 7.5; 325 MG/1; MG/1
1 TABLET ORAL EVERY 8 HOURS PRN
Status: DISCONTINUED | OUTPATIENT
Start: 2022-09-07 | End: 2022-09-07

## 2022-09-07 RX ORDER — SODIUM CHLORIDE 9 MG/ML
INJECTION, SOLUTION INTRAVENOUS
Status: COMPLETED
Start: 2022-09-07 | End: 2022-09-07

## 2022-09-07 RX ORDER — ROSUVASTATIN CALCIUM 5 MG/1
5 TABLET, COATED ORAL NIGHTLY
Status: DISCONTINUED | OUTPATIENT
Start: 2022-09-07 | End: 2022-09-07

## 2022-09-07 RX ORDER — GABAPENTIN 300 MG/1
600 CAPSULE ORAL NIGHTLY
Status: DISCONTINUED | OUTPATIENT
Start: 2022-09-07 | End: 2022-09-11 | Stop reason: HOSPADM

## 2022-09-07 RX ORDER — VALSARTAN 160 MG/1
320 TABLET ORAL ONCE
Status: COMPLETED | OUTPATIENT
Start: 2022-09-07 | End: 2022-09-07

## 2022-09-07 RX ORDER — GABAPENTIN 300 MG/1
300 CAPSULE ORAL 3 TIMES DAILY
COMMUNITY
Start: 2022-07-26 | End: 2022-09-07

## 2022-09-07 RX ORDER — HYDRALAZINE HYDROCHLORIDE 20 MG/ML
10 INJECTION INTRAMUSCULAR; INTRAVENOUS EVERY 6 HOURS PRN
Status: DISCONTINUED | OUTPATIENT
Start: 2022-09-07 | End: 2022-09-11 | Stop reason: HOSPADM

## 2022-09-07 RX ORDER — POTASSIUM CHLORIDE 7.45 MG/ML
10 INJECTION INTRAVENOUS
Status: DISCONTINUED | OUTPATIENT
Start: 2022-09-07 | End: 2022-09-11 | Stop reason: HOSPADM

## 2022-09-07 RX ORDER — ENOXAPARIN SODIUM 100 MG/ML
40 INJECTION SUBCUTANEOUS DAILY
Status: DISCONTINUED | OUTPATIENT
Start: 2022-09-07 | End: 2022-09-11 | Stop reason: HOSPADM

## 2022-09-07 RX ORDER — FUROSEMIDE 10 MG/ML
40 INJECTION INTRAMUSCULAR; INTRAVENOUS DAILY
Status: DISCONTINUED | OUTPATIENT
Start: 2022-09-08 | End: 2022-09-08

## 2022-09-07 RX ORDER — VALSARTAN 320 MG/1
320 TABLET ORAL DAILY
COMMUNITY
End: 2022-09-20 | Stop reason: SDUPTHER

## 2022-09-07 RX ORDER — HYDROCODONE BITARTRATE AND ACETAMINOPHEN 7.5; 325 MG/1; MG/1
1 TABLET ORAL ONCE
Status: COMPLETED | OUTPATIENT
Start: 2022-09-07 | End: 2022-09-07

## 2022-09-07 RX ORDER — CETIRIZINE HYDROCHLORIDE 10 MG/1
10 TABLET ORAL DAILY
Status: DISCONTINUED | OUTPATIENT
Start: 2022-09-08 | End: 2022-09-11 | Stop reason: HOSPADM

## 2022-09-07 RX ADMIN — GABAPENTIN 600 MG: 300 CAPSULE ORAL at 22:30

## 2022-09-07 RX ADMIN — ENOXAPARIN SODIUM 40 MG: 40 INJECTION SUBCUTANEOUS at 22:31

## 2022-09-07 RX ADMIN — POTASSIUM CHLORIDE 40 MEQ: 750 CAPSULE, EXTENDED RELEASE ORAL at 22:30

## 2022-09-07 RX ADMIN — IOPAMIDOL 69 ML: 755 INJECTION, SOLUTION INTRAVENOUS at 17:55

## 2022-09-07 RX ADMIN — IPRATROPIUM BROMIDE AND ALBUTEROL SULFATE 3 ML: 2.5; .5 SOLUTION RESPIRATORY (INHALATION) at 19:29

## 2022-09-07 RX ADMIN — CEFTRIAXONE SODIUM 1 G: 1 INJECTION, POWDER, FOR SOLUTION INTRAMUSCULAR; INTRAVENOUS at 22:30

## 2022-09-07 RX ADMIN — HYDROCODONE BITARTRATE AND ACETAMINOPHEN 1 TABLET: 7.5; 325 TABLET ORAL at 22:30

## 2022-09-07 RX ADMIN — HYDROCODONE BITARTRATE AND ACETAMINOPHEN 1 TABLET: 7.5; 325 TABLET ORAL at 16:42

## 2022-09-07 RX ADMIN — HYDROMORPHONE HYDROCHLORIDE 0.5 MG: 1 INJECTION, SOLUTION INTRAMUSCULAR; INTRAVENOUS; SUBCUTANEOUS at 19:11

## 2022-09-07 RX ADMIN — POTASSIUM CHLORIDE 40 MEQ: 750 CAPSULE, EXTENDED RELEASE ORAL at 18:57

## 2022-09-07 RX ADMIN — SODIUM CHLORIDE 250 ML: 9 INJECTION, SOLUTION INTRAVENOUS at 22:31

## 2022-09-07 RX ADMIN — VALSARTAN 320 MG: 160 TABLET, FILM COATED ORAL at 19:11

## 2022-09-07 RX ADMIN — FUROSEMIDE 40 MG: 10 INJECTION, SOLUTION INTRAVENOUS at 18:57

## 2022-09-07 RX ADMIN — DOXYCYCLINE 100 MG: 100 INJECTION, POWDER, LYOPHILIZED, FOR SOLUTION INTRAVENOUS at 23:41

## 2022-09-07 NOTE — ED NOTES
Pt presents to ED with C/O Leg swelling, Facial swelling, Fluid retention and SOA that has been persistent for a few weeks now.

## 2022-09-07 NOTE — ED PROVIDER NOTES
Subjective   PIT    Patient presents with retaining fluid for 1 to 2 weeks and shortness of breath for the last 3 days.  She endorses her left leg is swelling more than her right leg and she normally does not have any swelling at all.  She feels like her abdomen is distended.  Her last bowel movement was 3 hours ago and was normal.  She denies dysuria, fever or chills, cough, or nausea vomiting.          Review of Systems   Constitutional: Negative for activity change and appetite change.   HENT: Negative for congestion and ear pain.    Eyes: Negative for pain and discharge.   Respiratory: Positive for chest tightness (chest pressure), shortness of breath and wheezing.    Cardiovascular: Positive for leg swelling. Negative for chest pain and palpitations.   Gastrointestinal: Negative for abdominal distention and abdominal pain.   Endocrine: Negative for cold intolerance and heat intolerance.   Genitourinary: Negative for difficulty urinating and dysuria.   Musculoskeletal: Negative for arthralgias and back pain.   Skin: Negative for color change and rash.   Allergic/Immunologic: Negative for environmental allergies and food allergies.   Neurological: Negative for dizziness and headaches.   Hematological: Negative for adenopathy. Does not bruise/bleed easily.   Psychiatric/Behavioral: Negative for agitation and confusion. The patient is nervous/anxious.        Past Medical History:   Diagnosis Date   • Anxiety    • Arthritis    • Arthropathy of lumbar facet joint    • Callus    • Chronic depression    • Degeneration of lumbar intervertebral disc    • Drug therapy     Other long term (current) drug therapy      • Epilepsy (HCC)    • Foot pain, left    • Headache    • Hearing loss    • Heart problem    • High blood pressure    • Hypercholesterolemia    • Hypertensive disorder    • Obesity    • Osteoporosis    • Osteoporosis    • Pancreatitis    • Seizures (HCC)    • Varicose veins of lower extremity    • Wears glasses         Allergies   Allergen Reactions   • Amoxicillin-Pot Clavulanate Hives   • Clarithromycin Hives   • Ibuprofen Hives and Nausea And Vomiting   • Naproxen Hives and Nausea And Vomiting   • Nsaids Hives   • Other Other (See Comments)     Hx of MRSA       Past Surgical History:   Procedure Laterality Date   • APPENDECTOMY     • CARDIAC CATHETERIZATION  06/05/2013    Cardiac cath 50168 (1)      • CHOLECYSTECTOMY     • INCISION AND DRAINAGE LEG Left 1/29/2021    Procedure: Left foot incision and drainage, tibial sesamoidectomy            (MIN C-ARM);  Surgeon: Gamaliel Starks DPM;  Location: [facility];  Service: Podiatry;  Laterality: Left;   • INCISION AND DRAINAGE LEG Left 3/4/2021    Procedure: Left foot incision and drainage, bone biopsy;  Surgeon: Gamaliel Starks DPM;  Location: [facility];  Service: Podiatry;  Laterality: Left;   • INJECTION OF MEDICATION  03/28/2016    Injection for nerve block (2)      • OTHER SURGICAL HISTORY  05/05/2016    Incise spinal column/nerves (1)          Family History   Problem Relation Age of Onset   • Asthma Mother    • Cancer Mother         other   • Diabetes Mother    • Heart disease Mother    • Hyperlipidemia Mother    • Migraines Mother    • Osteoporosis Mother    • Cancer Father         other   • Hyperlipidemia Father    • Diabetes Maternal Grandmother    • Cancer Maternal Grandmother    • Heart disease Maternal Grandfather    • Mental illness Other         Mental Disorder   • Cancer Maternal Uncle        Social History     Socioeconomic History   • Marital status:    Tobacco Use   • Smoking status: Former Smoker   • Smokeless tobacco: Never Used   Vaping Use   • Vaping Use: Never used   Substance and Sexual Activity   • Alcohol use: No   • Drug use: No   • Sexual activity: Defer           Objective   Physical Exam  Vitals and nursing note reviewed.   Constitutional:       Appearance: She is well-developed. She is obese.   HENT:      Head: Normocephalic and  atraumatic.   Eyes:      Pupils: Pupils are equal, round, and reactive to light.   Neck:      Thyroid: No thyromegaly.      Vascular: No JVD.      Trachea: No tracheal deviation.   Cardiovascular:      Rate and Rhythm: Normal rate and regular rhythm.      Pulses:           Radial pulses are 2+ on the right side and 2+ on the left side.        Dorsalis pedis pulses are 2+ on the right side and 2+ on the left side.      Heart sounds: S1 normal and S2 normal. Murmur heard.   Pulmonary:      Effort: Pulmonary effort is normal.      Breath sounds: Wheezing (scant throughout) present.   Abdominal:      General: Bowel sounds are normal.      Palpations: Abdomen is soft.   Musculoskeletal:         General: Normal range of motion.      Right lower leg: Edema (trace pitting) present.      Left lower leg: Edema (1+ pitting) present.   Skin:     General: Skin is warm and dry.      Capillary Refill: Capillary refill takes 2 to 3 seconds.   Neurological:      Mental Status: She is alert and oriented to person, place, and time.      GCS: GCS eye subscore is 4. GCS verbal subscore is 5. GCS motor subscore is 6.   Psychiatric:         Speech: Speech normal.         Behavior: Behavior normal.         Thought Content: Thought content normal.         Procedures           ED Course      Vitals:    09/07/22 1929 09/07/22 1934 09/07/22 1937 09/07/22 1951   BP:    164/78   BP Location:       Patient Position:       Pulse: 82 82 82    Resp: 16   20   Temp:       TempSrc:       SpO2: 96% 100% 100%    Weight:       Height:         Lab Results (last 24 hours)     Procedure Component Value Units Date/Time    Troponin [[id number]]  (Normal) Collected: 09/07/22 1805    Specimen: Blood Updated: 09/07/22 1833     Troponin T <0.010 ng/mL     Narrative:      Troponin T Reference Range:  <= 0.03 ng/mL-   Negative for AMI  >0.03 ng/mL-     Abnormal for myocardial necrosis.  Clinicians would have to utilize clinical acumen, EKG, Troponin and serial  changes to determine if it is an Acute Myocardial Infarction or myocardial injury due to an underlying chronic condition.       Results may be falsely decreased if patient taking Biotin.      Magnesium [[id number]]  (Normal) Collected: 09/07/22 1544    Specimen: Blood Updated: 09/07/22 1759     Magnesium 1.8 mg/dL     COVID-19 and FLU A/B PCR - Swab, Nasopharynx [[id number]]  (Normal) Collected: 09/07/22 1642    Specimen: Swab from Nasopharynx Updated: 09/07/22 1732     COVID19 Not Detected     Influenza A PCR Not Detected     Influenza B PCR Not Detected    Narrative:      Fact sheet for providers: https://www.fda.gov/media/829003/download    Fact sheet for patients: https://www.fda.gov/media/521780/download    Test performed by PCR.    D-dimer, Quantitative [[id number]]  (Abnormal) Collected: 09/07/22 1544    Specimen: Blood Updated: 09/07/22 1615     D-Dimer, Quantitative 830 ng/mL (FEU)     Narrative:      Dimer values <500 ng/ml FEU are FDA approved as aid in diagnosis of deep venous thrombosis and pulmonary embolism.  This test should not be used in an exclusion strategy with pretest probability alone.    A recent guideline regarding diagnosis for pulmonary thromboembolism recommends an adjusted exclusion criterion of age x 10 ng/ml FEU for patients >50 years of age ([employer] Intern Med 2015; 163: 701-711).      Comprehensive Metabolic Panel [[id number]]  (Abnormal) Collected: 09/07/22 1544    Specimen: Blood Updated: 09/07/22 1609     Glucose 97 mg/dL      BUN 7 mg/dL      Creatinine 0.63 mg/dL      Sodium 141 mmol/L      Potassium 3.2 mmol/L      Chloride 103 mmol/L      CO2 29.0 mmol/L      Calcium 9.5 mg/dL      Total Protein 7.3 g/dL      Albumin 4.10 g/dL      ALT (SGPT) 12 U/L      AST (SGOT) 19 U/L      Alkaline Phosphatase 111 U/L      Total Bilirubin 0.6 mg/dL      Globulin 3.2 gm/dL      A/G Ratio 1.3 g/dL      BUN/Creatinine Ratio 11.1     Anion Gap 9.0 mmol/L      eGFR 103.6 mL/min/1.73      Comment:  National Kidney Foundation and American Society of Nephrology (ASN) Task Force recommended calculation based on the Chronic Kidney Disease Epidemiology Collaboration (CKD-EPI) equation refit without adjustment for race.       Narrative:      GFR Normal >60  Chronic Kidney Disease <60  Kidney Failure <15      Troponin [[id number]]  (Normal) Collected: 09/07/22 1544    Specimen: Blood Updated: 09/07/22 1609     Troponin T <0.010 ng/mL     Narrative:      Troponin T Reference Range:  <= 0.03 ng/mL-   Negative for AMI  >0.03 ng/mL-     Abnormal for myocardial necrosis.  Clinicians would have to utilize clinical acumen, EKG, Troponin and serial changes to determine if it is an Acute Myocardial Infarction or myocardial injury due to an underlying chronic condition.       Results may be falsely decreased if patient taking Biotin.      BNP [[id number]]  (Normal) Collected: 09/07/22 1544    Specimen: Blood Updated: 09/07/22 1608     proBNP 586.0 pg/mL     Narrative:      Among patients with dyspnea, NT-proBNP is highly sensitive for the detection of acute congestive heart failure. In addition NT-proBNP of <300 pg/ml effectively rules out acute congestive heart failure with 99% negative predictive value.    Results may be falsely decreased if patient taking Biotin.      Urinalysis, Microscopic Only - Urine, Clean Catch [[id number]]  (Abnormal) Collected: 09/07/22 1545    Specimen: Urine, Clean Catch Updated: 09/07/22 1600     RBC, UA 0-2 /HPF      WBC, UA 0-2 /HPF      Bacteria, UA None Seen /HPF      Squamous Epithelial Cells, UA 0-2 /HPF      Hyaline Casts, UA None Seen /LPF      Methodology Automated Microscopy    Urinalysis With Microscopic If Indicated (No Culture) - Urine, Clean Catch [[id number]]  (Abnormal) Collected: 09/07/22 1545    Specimen: Urine, Clean Catch Updated: 09/07/22 1559     Color, UA Yellow     Appearance, UA Clear     pH, UA 7.0     Specific [place], UA 1.004     Comment: Result obtained by Refractometer         Glucose, UA Negative     Ketones, UA Negative     Bilirubin, UA Negative     Blood, UA Small (1+)     Protein, UA Negative     Leuk Esterase, UA Negative     Nitrite, UA Negative     Urobilinogen, UA 0.2 E.U./dL    CBC & Differential [[id number]]  (Abnormal) Collected: 09/07/22 1544    Specimen: Blood Updated: 09/07/22 1552    Narrative:      The following orders were created for panel order CBC & Differential.  Procedure                               Abnormality         Status                     ---------                               -----------         ------                     CBC Auto Differential[[id number]]        Abnormal            Final result                 Please view results for these tests on the individual orders.    CBC Auto Differential [[id number]]  (Abnormal) Collected: 09/07/22 1544    Specimen: Blood Updated: 09/07/22 1552     WBC 7.75 10*3/mm3      RBC 4.98 10*6/mm3      Hemoglobin 13.7 g/dL      Hematocrit 38.9 %      MCV 78.1 fL      MCH 27.5 pg      MCHC 35.2 g/dL      RDW 13.2 %      RDW-SD 37.4 fl      MPV 9.7 fL      Platelets 174 10*3/mm3      Neutrophil % 69.6 %      Lymphocyte % 22.6 %      Monocyte % 6.1 %      Eosinophil % 0.5 %      Basophil % 0.4 %      Immature Grans % 0.8 %      Neutrophils, Absolute 5.40 10*3/mm3      Lymphocytes, Absolute 1.75 10*3/mm3      Monocytes, Absolute 0.47 10*3/mm3      Eosinophils, Absolute 0.04 10*3/mm3      Basophils, Absolute 0.03 10*3/mm3      Immature Grans, Absolute 0.06 10*3/mm3      nRBC 0.0 /100 WBC         XR Chest 1 View    Result Date: 9/7/2022  1.  Mild to moderate pulmonary vascular congestion increased since prior. 2.  There is cardiomegaly. Electronically signed by:  Jey Main MD  9/7/2022 3:00 PM CDT Workstation: [id number]    US Venous Doppler Lower Extremity Bilateral (duplex)    Result Date: 9/7/2022  IMPRESSION 1.  No evidence of deep venous thrombosis of the bilateral lower extremities. Electronically signed by:  Jey Main  MD  9/7/2022 4:54 PM CDT Workstation: [id number]    CT Angiogram Chest    Result Date: 9/7/2022  No evidence of central pulmonary embolism. Assessment of branches of pulmonary artery distal to the segmental branches are limited due to suboptimal contrast. Mild consolidation in the bilateral posterior lower lobes, which may be due to atelectasis versus infiltrate. Heterogeneous appearance of the lung parenchyma due to sequelae of small airway disease, such as asthma, among other etiologies. Diffuse fatty change of visualized liver with splenomegaly.. Electronically signed by:  Jey Main MD  9/7/2022 6:09 PM CDT Workstation: [id number]                                         MDM  Number of Diagnoses or Management Options  Congestive heart failure, unspecified HF chronicity, unspecified heart failure type (HCC)  Dyspnea on exertion  Diagnosis management comments: Patient with CT findings of pulmonary vascular congestion versus infiltrate.  White count normal and patient afebrile.  Lasix given with good diuresis.  Labs otherwise unremarkable with the exception of elevated D-dimer.  CTA of the chest negative for PE.  Patient able to maintain her sats while ambulating on room air greater than 94% but became severely dyspneic with tachycardia and tachypnea.  Discussed case with hospitalist who accepts patient for ops.       Amount and/or Complexity of Data Reviewed  Clinical lab tests: ordered and reviewed  Tests in the radiology section of CPT®: ordered and reviewed    Patient Progress  Patient progress: stable      Final diagnoses:   Congestive heart failure, unspecified HF chronicity, unspecified heart failure type (HCC)   Dyspnea on exertion       ED Disposition  ED Disposition     ED Disposition   Decision to Admit    Condition   --    Comment   Level of Care: Telemetry [5]   Diagnosis: Congestive heart failure, unspecified HF chronicity, unspecified heart failure type (HCC) [[id number]]   Admitting Physician: BEHROOZI,  SUZANNE [[id number]]   Attending Physician: BEHROOZI, SAEID [[id number]]               No follow-up provider specified.       Medication List      No changes were made to your prescriptions during this visit.       This document has been electronically signed by Alta Zamora MD on September 7, 2022 20:33 CDT    Alta Zamora MD   Part of this note may be an electronic transcription/translation of spoken language to printed text using the Dragon Dictation System.        Alta Zamora MD  09/07/22 2033

## 2022-09-08 ENCOUNTER — APPOINTMENT (OUTPATIENT)
Dept: CARDIOLOGY | Facility: HOSPITAL | Age: 57
End: 2022-09-08

## 2022-09-08 LAB
ALBUMIN SERPL-MCNC: 3.9 G/DL (ref 3.5–5.2)
ALBUMIN/GLOB SERPL: 1.3 G/DL
ALP SERPL-CCNC: 98 U/L (ref 39–117)
ALT SERPL W P-5'-P-CCNC: 9 U/L (ref 1–33)
ANION GAP SERPL CALCULATED.3IONS-SCNC: 10 MMOL/L (ref 5–15)
AST SERPL-CCNC: 16 U/L (ref 1–32)
BILIRUB SERPL-MCNC: 0.4 MG/DL (ref 0–1.2)
BUN SERPL-MCNC: 9 MG/DL (ref 6–20)
BUN/CREAT SERPL: 14.5 (ref 7–25)
CALCIUM SPEC-SCNC: 9.2 MG/DL (ref 8.6–10.5)
CHLORIDE SERPL-SCNC: 101 MMOL/L (ref 98–107)
CO2 SERPL-SCNC: 31 MMOL/L (ref 22–29)
CREAT SERPL-MCNC: 0.62 MG/DL (ref 0.57–1)
D-LACTATE SERPL-SCNC: 1.9 MMOL/L (ref 0.5–2)
D-LACTATE SERPL-SCNC: 2.8 MMOL/L (ref 0.5–2)
DEPRECATED RDW RBC AUTO: 39.2 FL (ref 37–54)
EGFRCR SERPLBLD CKD-EPI 2021: 104 ML/MIN/1.73
ERYTHROCYTE [DISTWIDTH] IN BLOOD BY AUTOMATED COUNT: 13.6 % (ref 12.3–15.4)
GLOBULIN UR ELPH-MCNC: 3.1 GM/DL
GLUCOSE SERPL-MCNC: 118 MG/DL (ref 65–99)
HBA1C MFR BLD: 5.9 % (ref 4.8–5.6)
HCT VFR BLD AUTO: 36 % (ref 34–46.6)
HGB BLD-MCNC: 12.1 G/DL (ref 12–15.9)
MAGNESIUM SERPL-MCNC: 1.9 MG/DL (ref 1.6–2.6)
MCH RBC QN AUTO: 27.2 PG (ref 26.6–33)
MCHC RBC AUTO-ENTMCNC: 33.6 G/DL (ref 31.5–35.7)
MCV RBC AUTO: 80.9 FL (ref 79–97)
PLATELET # BLD AUTO: 153 10*3/MM3 (ref 140–450)
PMV BLD AUTO: 9.4 FL (ref 6–12)
POTASSIUM SERPL-SCNC: 3.4 MMOL/L (ref 3.5–5.2)
POTASSIUM SERPL-SCNC: 4.3 MMOL/L (ref 3.5–5.2)
PROCALCITONIN SERPL-MCNC: 0.08 NG/ML (ref 0–0.25)
PROT SERPL-MCNC: 7 G/DL (ref 6–8.5)
RBC # BLD AUTO: 4.45 10*6/MM3 (ref 3.77–5.28)
SODIUM SERPL-SCNC: 142 MMOL/L (ref 136–145)
TROPONIN T SERPL-MCNC: <0.01 NG/ML (ref 0–0.03)
TSH SERPL DL<=0.05 MIU/L-ACNC: 1.45 UIU/ML (ref 0.27–4.2)
WBC NRBC COR # BLD: 8.39 10*3/MM3 (ref 3.4–10.8)

## 2022-09-08 PROCEDURE — 25010000002 FUROSEMIDE PER 20 MG: Performed by: INTERNAL MEDICINE

## 2022-09-08 PROCEDURE — 97162 PT EVAL MOD COMPLEX 30 MIN: CPT

## 2022-09-08 PROCEDURE — G0378 HOSPITAL OBSERVATION PER HR: HCPCS

## 2022-09-08 PROCEDURE — 25010000002 ONDANSETRON PER 1 MG: Performed by: STUDENT IN AN ORGANIZED HEALTH CARE EDUCATION/TRAINING PROGRAM

## 2022-09-08 PROCEDURE — 83036 HEMOGLOBIN GLYCOSYLATED A1C: CPT | Performed by: INTERNAL MEDICINE

## 2022-09-08 PROCEDURE — 84443 ASSAY THYROID STIM HORMONE: CPT | Performed by: INTERNAL MEDICINE

## 2022-09-08 PROCEDURE — 84145 PROCALCITONIN (PCT): CPT | Performed by: INTERNAL MEDICINE

## 2022-09-08 PROCEDURE — 25010000002 PERFLUTREN (DEFINITY) 8.476 MG IN SODIUM CHLORIDE (PF) 0.9 % 10 ML INJECTION: Performed by: STUDENT IN AN ORGANIZED HEALTH CARE EDUCATION/TRAINING PROGRAM

## 2022-09-08 PROCEDURE — 93306 TTE W/DOPPLER COMPLETE: CPT

## 2022-09-08 PROCEDURE — 84132 ASSAY OF SERUM POTASSIUM: CPT | Performed by: STUDENT IN AN ORGANIZED HEALTH CARE EDUCATION/TRAINING PROGRAM

## 2022-09-08 PROCEDURE — 94799 UNLISTED PULMONARY SVC/PX: CPT

## 2022-09-08 PROCEDURE — 83735 ASSAY OF MAGNESIUM: CPT | Performed by: INTERNAL MEDICINE

## 2022-09-08 PROCEDURE — 80053 COMPREHEN METABOLIC PANEL: CPT | Performed by: INTERNAL MEDICINE

## 2022-09-08 PROCEDURE — 83605 ASSAY OF LACTIC ACID: CPT | Performed by: INTERNAL MEDICINE

## 2022-09-08 PROCEDURE — 25010000002 FUROSEMIDE PER 20 MG: Performed by: STUDENT IN AN ORGANIZED HEALTH CARE EDUCATION/TRAINING PROGRAM

## 2022-09-08 PROCEDURE — 85027 COMPLETE CBC AUTOMATED: CPT | Performed by: INTERNAL MEDICINE

## 2022-09-08 PROCEDURE — 25010000002 MORPHINE PER 10 MG: Performed by: STUDENT IN AN ORGANIZED HEALTH CARE EDUCATION/TRAINING PROGRAM

## 2022-09-08 PROCEDURE — 84484 ASSAY OF TROPONIN QUANT: CPT | Performed by: INTERNAL MEDICINE

## 2022-09-08 RX ORDER — FUROSEMIDE 10 MG/ML
40 INJECTION INTRAMUSCULAR; INTRAVENOUS EVERY 12 HOURS
Status: DISCONTINUED | OUTPATIENT
Start: 2022-09-08 | End: 2022-09-10

## 2022-09-08 RX ORDER — ONDANSETRON 2 MG/ML
4 INJECTION INTRAMUSCULAR; INTRAVENOUS EVERY 6 HOURS PRN
Status: DISCONTINUED | OUTPATIENT
Start: 2022-09-08 | End: 2022-09-11 | Stop reason: HOSPADM

## 2022-09-08 RX ORDER — MORPHINE SULFATE 2 MG/ML
1 INJECTION, SOLUTION INTRAMUSCULAR; INTRAVENOUS ONCE
Status: COMPLETED | OUTPATIENT
Start: 2022-09-08 | End: 2022-09-08

## 2022-09-08 RX ORDER — POTASSIUM CHLORIDE 1.5 G/1.77G
40 POWDER, FOR SOLUTION ORAL 2 TIMES DAILY
Status: DISCONTINUED | OUTPATIENT
Start: 2022-09-08 | End: 2022-09-08

## 2022-09-08 RX ORDER — HYDROCODONE BITARTRATE AND ACETAMINOPHEN 7.5; 325 MG/1; MG/1
1 TABLET ORAL EVERY 4 HOURS PRN
Status: DISCONTINUED | OUTPATIENT
Start: 2022-09-08 | End: 2022-09-11 | Stop reason: HOSPADM

## 2022-09-08 RX ADMIN — HYDROCODONE BITARTRATE AND ACETAMINOPHEN 1 TABLET: 7.5; 325 TABLET ORAL at 09:42

## 2022-09-08 RX ADMIN — POTASSIUM CHLORIDE 40 MEQ: 750 CAPSULE, EXTENDED RELEASE ORAL at 12:26

## 2022-09-08 RX ADMIN — Medication 10 ML: at 20:26

## 2022-09-08 RX ADMIN — VALSARTAN 320 MG: 160 TABLET, FILM COATED ORAL at 08:17

## 2022-09-08 RX ADMIN — HYDROCODONE BITARTRATE AND ACETAMINOPHEN 1 TABLET: 7.5; 325 TABLET ORAL at 18:49

## 2022-09-08 RX ADMIN — HYDROCODONE BITARTRATE AND ACETAMINOPHEN 1 TABLET: 7.5; 325 TABLET ORAL at 05:46

## 2022-09-08 RX ADMIN — POTASSIUM CHLORIDE 40 MEQ: 750 CAPSULE, EXTENDED RELEASE ORAL at 08:17

## 2022-09-08 RX ADMIN — IPRATROPIUM BROMIDE AND ALBUTEROL SULFATE 3 ML: 2.5; .5 SOLUTION RESPIRATORY (INHALATION) at 14:09

## 2022-09-08 RX ADMIN — Medication 10 ML: at 08:19

## 2022-09-08 RX ADMIN — MORPHINE SULFATE 1 MG: 2 INJECTION, SOLUTION INTRAMUSCULAR; INTRAVENOUS at 12:40

## 2022-09-08 RX ADMIN — FUROSEMIDE 40 MG: 10 INJECTION, SOLUTION INTRAVENOUS at 17:33

## 2022-09-08 RX ADMIN — PERFLUTREN 1.5 ML: 6.52 INJECTION, SUSPENSION INTRAVENOUS at 12:47

## 2022-09-08 RX ADMIN — ONDANSETRON 4 MG: 2 INJECTION INTRAMUSCULAR; INTRAVENOUS at 09:42

## 2022-09-08 RX ADMIN — GABAPENTIN 600 MG: 300 CAPSULE ORAL at 20:24

## 2022-09-08 RX ADMIN — IPRATROPIUM BROMIDE AND ALBUTEROL SULFATE 3 ML: 2.5; .5 SOLUTION RESPIRATORY (INHALATION) at 07:52

## 2022-09-08 RX ADMIN — HYDROCODONE BITARTRATE AND ACETAMINOPHEN 1 TABLET: 7.5; 325 TABLET ORAL at 22:57

## 2022-09-08 RX ADMIN — CETIRIZINE HYDROCHLORIDE 10 MG: 10 TABLET, FILM COATED ORAL at 08:17

## 2022-09-08 RX ADMIN — HYDROCODONE BITARTRATE AND ACETAMINOPHEN 1 TABLET: 7.5; 325 TABLET ORAL at 14:11

## 2022-09-08 RX ADMIN — FUROSEMIDE 40 MG: 10 INJECTION, SOLUTION INTRAVENOUS at 08:17

## 2022-09-08 NOTE — THERAPY EVALUATION
Patient Name: Haven Mahoney  : 1965    MRN: [id number]                              Today's Date: 2022       Admit Date: 2022    Visit Dx:     ICD-10-CM ICD-9-CM   1. Congestive heart failure, unspecified HF chronicity, unspecified heart failure type (HCC)  I50.9 428.0   2. Dyspnea on exertion  R06.00 786.09   3. Impaired functional mobility and activity tolerance  Z74.09 V49.89     Patient Active Problem List   Diagnosis   • Lumbosacral spondylosis without myelopathy   • Chronic pain of right knee   • High risk medications (not anticoagulants) long-term use   • Essential hypertension   • Mixed hyperlipidemia   • Heart murmur   • Cellulitis and abscess of foot   • Pyogenic inflammation of bone (HCC)   • Osteomyelitis of great toe of left foot (HCC)   • COVID-19   • Acute decompensated heart failure (HCC)     Past Medical History:   Diagnosis Date   • Anxiety    • Arthritis    • Arthropathy of lumbar facet joint    • Callus    • Chronic depression    • Degeneration of lumbar intervertebral disc    • Drug therapy     Other long term (current) drug therapy      • Epilepsy (HCC)    • Foot pain, left    • Headache    • Hearing loss    • Heart problem    • High blood pressure    • Hypercholesterolemia    • Hypertensive disorder    • Obesity    • Osteoporosis    • Osteoporosis    • Pancreatitis    • Seizures (HCC)    • Varicose veins of lower extremity    • Wears glasses      Past Surgical History:   Procedure Laterality Date   • APPENDECTOMY     • CARDIAC CATHETERIZATION  2013    Cardiac cath 92975 (1)      • CHOLECYSTECTOMY     • INCISION AND DRAINAGE LEG Left 2021    Procedure: Left foot incision and drainage, tibial sesamoidectomy            (MIN C-ARM);  Surgeon: Gamaliel Starks DPM;  Location: [facility];  Service: Podiatry;  Laterality: Left;   • INCISION AND DRAINAGE LEG Left 3/4/2021    Procedure: Left foot incision and drainage, bone biopsy;  Surgeon: Gamaliel Starks DPM;   Location: [facility];  Service: Podiatry;  Laterality: Left;   • INJECTION OF MEDICATION  03/28/2016    Injection for nerve block (2)      • OTHER SURGICAL HISTORY  05/05/2016    Incise spinal column/nerves (1)         General Information     Row Name 09/08/22 1318          Physical Therapy Time and Intention    Document Type evaluation  -LR (r) NC (t) LR (c)     Mode of Treatment individual therapy;physical therapy  -LR (r) NC (t) LR (c)     Row Name 09/08/22 1318          General Information    Patient Profile Reviewed yes  -LR (r) NC (t) LR (c)     Prior Level of Function independent:;all household mobility;community mobility;gait;transfer;cleaning;ADL's;feeding;dressing;bathing;grooming;cooking  -LR (r) NC (t) LR (c)     Existing Precautions/Restrictions fall  -LR (r) NC (t) LR (c)     Barriers to Rehab medically complex;previous functional deficit  -LR (r) NC (t) LR (c)     Row Name 09/08/22 1318          Living Environment    People in Home spouse  -LR (r) NC (t) LR (c)     Row Name 09/08/22 1318          Home Main Entrance    Number of Stairs, Main Entrance two  -LR (r) NC (t) LR (c)     Stair Railings, Main Entrance railing on left side (ascending)  -LR (r) NC (t) LR (c)     Row Name 09/08/22 1318          Stairs Within Home, Primary    Stairs, Within Home, Primary walk in shower with chair, no AD  -LR (r) NC (t) LR (c)     Row Name 09/08/22 1318          Cognition    Orientation Status (Cognition) oriented x 4  -LR (r) NC (t) LR (c)     Row Name 09/08/22 1318          Safety Issues, Functional Mobility    Safety Issues Affecting Function (Mobility) safety precautions follow-through/compliance;safety precaution awareness  -LR     Impairments Affecting Function (Mobility) balance;coordination;shortness of breath;endurance/activity tolerance  -LR (r) NC (t) LR (c)           User Key  (r) = Recorded By, (t) = Taken By, (c) = Cosigned By    Initials Name Provider Type    LR Bay Bhakta Physical Therapist     Benito Nieves, PT Student PT Student               Mobility     Row Name 09/08/22 1318          Bed Mobility    Bed Mobility supine-sit;sit-supine  -LR     Supine-Sit [place] (Bed Mobility) standby assist  -LR     Sit-Supine [place] (Bed Mobility) standby assist  -LR     Row Name 09/08/22 1318          Sit-Stand Transfer    Sit-Stand [place] (Transfers) standby assist  -LR     Row Name 09/08/22 1318          Gait/Stairs (Locomotion)    [place] Level (Gait) standby assist  -LR     Distance in Feet (Gait) 24'x1  -LR     Deviations/Abnormal Patterns (Gait) stride length decreased  -LR           User Key  (r) = Recorded By, (t) = Taken By, (c) = Cosigned By    Initials Name Provider Type    LR Bay Bhakta Physical Therapist               Obj/Interventions     Row Name 09/08/22 1318          Range of Motion Comprehensive    General Range of Motion bilateral lower extremity ROM WFL  -LR     Row Name 09/08/22 1318          Strength Comprehensive (MMT)    General Manual Muscle Testing (MMT) Assessment no strength deficits identified  -LR     Comment, General Manual Muscle Testing (MMT) Assessment BLE grossly 4+/5  -LR     Row Name 09/08/22 1318          Sensory Assessment (Somatosensory)    Sensory Assessment (Somatosensory) sensation intact  -LR           User Key  (r) = Recorded By, (t) = Taken By, (c) = Cosigned By    Initials Name Provider Type    LR Bay Bhakta Physical Therapist               Goals/Plan     Row Name 09/08/22 1318          Bed Mobility Goal 1 (PT)    Activity/Assistive Device (Bed Mobility Goal 1, PT) sit to supine;supine to sit  -LR     [place] Level/Cues Needed (Bed Mobility Goal 1, PT) independent  -LR     Time Frame (Bed Mobility Goal 1, PT) by discharge  -LR     Progress/Outcomes (Bed Mobility Goal 1, PT) goal not met  -LR     Row Name 09/08/22 1318          Transfer Goal 1 (PT)    Activity/Assistive Device (Transfer Goal 1, PT) sit-to-stand/stand-to-sit  -LR      [place] Level/Cues Needed (Transfer Goal 1, PT) independent  -LR     Time Frame (Transfer Goal 1, PT) by discharge  -LR     Progress/Outcome (Transfer Goal 1, PT) goal not met  -LR     Row Name 09/08/22 1318          Gait Training Goal 1 (PT)    Activity/Assistive Device (Gait Training Goal 1, PT) gait (walking locomotion)  -LR     [place] Level (Gait Training Goal 1, PT) independent  -LR     Distance (Gait Training Goal 1, PT) 100'x1  -LR     Time Frame (Gait Training Goal 1, PT) by discharge  -LR     Progress/Outcome (Gait Training Goal 1, PT) goal not met  -LR     Row Name 09/08/22 1318          Stairs Goal 1 (PT)    Activity/Assistive Device (Stairs Goal 1, PT) stairs, all skills;ascending stairs;descending stairs  -LR     [place] Level/Cues Needed (Stairs Goal 1, PT) independent  -LR     Number of Stairs (Stairs Goal 1, PT) 2  -LR     Time Frame (Stairs Goal 1, PT) by discharge  -LR     Progress/Outcome (Stairs Goal 1, PT) goal not met  -LR     Row Name 09/08/22 1318          Therapy Assessment/Plan (PT)    Planned Therapy Interventions (PT) balance training;motor coordination training;vestibular therapy;wheelchair management/propulsion training;ROM (range of motion);neuromuscular re-education;bed mobility training;gait training;stair training;home exercise program;strengthening;wound care;joint mobilization;patient/family education;lumbar stabilization;postural re-education;manual therapy techniques;transfer training;stretching;swiss ball techniques  -LR           User Key  (r) = Recorded By, (t) = Taken By, (c) = Cosigned By    Initials Name Provider Type    LR Bay Bhakta Physical Therapist               Clinical Impression     Row Name 09/08/22 1318          Pain    Pretreatment Pain Rating 0/10 - no pain  -LR     Posttreatment Pain Rating 0/10 - no pain  -LR     Row Name 09/08/22 1318          Plan of Care Review    Plan of Care Reviewed With patient  -LR     Outcome Evaluation PT  randi completed. Patient pleasant. Bed mobility: SBA supine<>sit with HOB elevated and bed rails. Transfers: SBA sit<>stand t/f. Gait: SBA 24'x1 with no AD. Patient presented with decreased stride length. SPO2% stable with ambulation 95%. Paitent also had a noticable increased in respiration rate that returned to baseline after 2 minutes rest. Balance: 27/28 on Tinetti Balance and gait assessment which indicates a low fall risk. Educated patient in working out for time when at home to increase activity tolerance. Anticipate home with assist at d/c.  -LR     Row Name 09/08/22 1311          Therapy Assessment/Plan (PT)    Patient/Family Therapy Goals Statement (PT) Return home.  -LR     Rehab Potential (PT) good, to achieve stated therapy goals  -LR (r) NC (t) LR (c)     Criteria for Skilled Interventions Met (PT) yes;meets criteria;skilled treatment is necessary  -LR (r) NC (t) LR (c)     Therapy Frequency (PT) other (see comments)  3-7d/week  -LR     Predicted Duration of Therapy Intervention (PT) until d/c or all goals met  -LR (r) NC (t) LR (c)     Row Name 09/08/22 1318          Vital Signs    Pre Systolic BP Rehab 137  -LR (r) NC (t) LR (c)     Pre Treatment Diastolic BP 75  -LR (r) NC (t) LR (c)     Pretreatment Heart Rate (beats/min) 84  -LR (r) NC (t) LR (c)     Pre SpO2 (%) 96  -LR (r) NC (t) LR (c)     O2 Delivery Pre Treatment room air  -LR     Intra SpO2 (%) 95  -LR     O2 Delivery Intra Treatment room air  -LR     O2 Delivery Post Treatment room air  -LR     Pre Patient Position Supine  -LR     Intra Patient Position Sitting  -LR     Post Patient Position Supine  -LR     Row Name 09/08/22 7628          Positioning and Restraints    Pre-Treatment Position in bed  -LR     Post Treatment Position bed  -LR     In Bed notified nsg;supine;call light within reach;encouraged to call for assist;exit alarm on  -LR           User Key  (r) = Recorded By, (t) = Taken By, (c) = Cosigned By    Initials Name Provider  Type    LR Bay Bhakta Physical Therapist    NC Benito Bella, PT Student PT Student               Outcome Measures     Row Name 09/08/22 1318 09/08/22 1101       How much help from another person do you currently need...    Turning from your back to your side while in flat bed without using bedrails? 4  -LR 4  -FH    Moving from lying on back to sitting on the side of a flat bed without bedrails? 4  -LR 4  -FH    Moving to and from a bed to a chair (including a wheelchair)? 4  -LR 4  -FH    Standing up from a chair using your arms (e.g., wheelchair, bedside chair)? 4  -LR 4  -FH    Climbing 3-5 steps with a railing? 3  -LR 3  -FH    To walk in hospital room? 4  -LR 4  -FH    AM-PAC 6 Clicks Score (PT) 23  -LR 23  -FH    Highest level of mobility 7 --> Walked 25 feet or more  -LR 7 --> Walked 25 feet or more  -FH    Row Name 09/08/22 0720          How much help from another person do you currently need...    Turning from your back to your side while in flat bed without using bedrails? 4  -FH     Moving from lying on back to sitting on the side of a flat bed without bedrails? 4  -FH     Moving to and from a bed to a chair (including a wheelchair)? 4  -FH     Standing up from a chair using your arms (e.g., wheelchair, bedside chair)? 4  -FH     Climbing 3-5 steps with a railing? 3  -FH     To walk in hospital room? 4  -FH     AM-PAC 6 Clicks Score (PT) 23  -FH     Highest level of mobility 7 --> Walked 25 feet or more  -FH     Row Name 09/08/22 1318          Tinetti Assessment    Tinetti Assessment yes  -LR     Sitting Balance 1  -LR     Arises 2  -LR     Attempts to Rise 2  -LR     Immediate Standing Balance (first 5 sec) 2  -LR     Standing Balance 2  -LR     Sternal Nudge (feet close together) 2  -LR     Eyes Closed (feet close together) 1  -LR     Turning 360 Degrees- Steps 1  -LR     Turning 360 Degrees- Steadiness 1  -LR     Sitting Down 2  -LR     Tinetti Balance Score 16  -LR     Gait Initiation  "(immediate after told \"go\") 1  -LR     Step Length- Right Swing 1  -LR     Step Length- Left Swing 1  -LR     Foot Clearance- Right Foot 1  -LR     Foot Clearance- Left Foot 1  -LR     Step Symmetry 1  -LR     Step Continuity 1  -LR     Path (excursion) 2  -LR     Trunk 2  -LR     Base of Support 0  -LR     Gait Score 11  -LR     Tinetti Total Score 27  -LR     Row Name 09/08/22 1318          Functional Assessment    Outcome Measure Options AM-PAC 6 Clicks Basic Mobility (PT);Tinetti  -LR           User Key  (r) = Recorded By, (t) = Taken By, (c) = Cosigned By    Initials Name Provider Type     Tamiko Diop, RN Registered Nurse    LR Bay Bhakta Physical Therapist                             Physical Therapy Education                 Title: PT OT SLP Therapies (Done)     Topic: Physical Therapy (Done)     Point: Mobility training (Done)     Learning Progress Summary           Patient Acceptance, E,TB, VU,NR by LR at 9/8/2022 1415    Comment: Educated on PT POC and goals.                   Point: Home exercise program (Done)     Learning Progress Summary           Patient Acceptance, E,TB, VU,NR by LR at 9/8/2022 1415    Comment: Educated on PT POC and goals.                   Point: Body mechanics (Done)     Learning Progress Summary           Patient Acceptance, E,TB, VU,NR by LR at 9/8/2022 1415    Comment: Educated on PT POC and goals.                   Point: Precautions (Done)     Learning Progress Summary           Patient Acceptance, E,TB, VU,NR by LR at 9/8/2022 1415    Comment: Educated on PT POC and goals.                               User Key     Initials Effective Dates Name Provider Type Discipline     06/16/21 -  Bay Bhakta Physical Therapist PT              PT Recommendation and Plan  Planned Therapy Interventions (PT): balance training, motor coordination training, vestibular therapy, wheelchair management/propulsion training, ROM (range of motion), neuromuscular re-education, bed " mobility training, gait training, stair training, home exercise program, strengthening, wound care, joint mobilization, patient/family education, lumbar stabilization, postural re-education, manual therapy techniques, transfer training, stretching, swiss ball techniques  Plan of Care Reviewed With: patient  Outcome Evaluation: PT eval completed. Patient pleasant. Bed mobility: SBA supine<>sit with HOB elevated and bed rails. Transfers: SBA sit<>stand t/f. Gait: SBA 24'x1 with no AD. Patient presented with decreased stride length. SPO2% stable with ambulation 95%. Paitent also had a noticable increased in respiration rate that returned to baseline after 2 minutes rest. Balance: 27/28 on Tinetti Balance and gait assessment which indicates a low fall risk. Educated patient in working out for time when at home to increase activity tolerance. Anticipate home with assist at d/c.     Time Calculation:    PT Charges     Row Name 09/08/22 1415             Time Calculation    Start Time 1318  -LR      Stop Time 1345  -LR      Time Calculation (min) 27 min  -LR      PT Received On 09/08/22  -LR      PT Goal Re-Cert Due Date 09/21/22  -LR              Untimed Charges    PT Eval/Re-eval Minutes 27  -LR              Total Minutes    Untimed Charges Total Minutes 27  -LR       Total Minutes 27  -LR            User Key  (r) = Recorded By, (t) = Taken By, (c) = Cosigned By    Initials Name Provider Type    LR Bay Bhakta Physical Therapist              Therapy Charges for Today     Code Description Service Date Service Provider Modifiers Qty    [id number] HC PT EVAL MOD COMPLEXITY 2 9/8/2022 Bay Bhakta GP 1          PT G-Codes  Outcome Measure Options: AM-PAC 6 Clicks Basic Mobility (PT), Tinetti  AM-PAC 6 Clicks Score (PT): 23  Tinetti Total Score: 27    Bay Bhakta  9/8/2022

## 2022-09-08 NOTE — PLAN OF CARE
Goal Outcome Evaluation:  Plan of Care Reviewed With: patient           Outcome Evaluation: PT eval completed. Patient pleasant. Bed mobility: SBA supine<>sit with HOB elevated and bed rails. Transfers: SBA sit<>stand t/f. Gait: SBA 24'x1 with no AD. Patient presented with decreased stride length. SPO2% stable with ambulation 95%. Paitent also had a noticable increased in respiration rate that returned to baseline after 2 minutes rest. Balance: 27/28 on Tinetti Balance and gait assessment which indicates a low fall risk. Educated patient in working out for time when at home to increase activity tolerance. Anticipate home with assist at d/c.

## 2022-09-08 NOTE — PROGRESS NOTES
[facility] Medicine   INPATIENT PROGRESS NOTE      Patient Name: Haven Mahoney  Date of Admission: 9/7/2022  Today's Date: 09/08/22  Length of Stay: 0  Primary Care Physician: Asif Lin MD    Subjective   Chief Complaint: Shortness of breath, swelling    HPI   56-year-old female with hypertension, hyperlipidemia, admitted with concern for decompensated heart failure after presenting with worsening dyspnea on exertion, increased bilateral lower extremity and abdominal swelling, orthopnea, with evidence of pulmonary edema on chest x-ray.  proBNP not elevated however this is not a sensitive test in setting of morbid obesity.  Admitted and treated with IV Lasix.  Echocardiogram obtained.    Subjective:  Events noted.  Orthopnea has improved some today.  Remains with dyspnea on exertion.        Review of Systems   14 point review of systems completed  All pertinent negatives and positives are as above. All other systems have been reviewed and are negative unless otherwise stated.     Objective    Temp:  [97.1 °F (36.2 °C)-98.1 °F (36.7 °C)] 97.1 °F (36.2 °C)  Heart Rate:  [74-94] 89  Resp:  [16-22] 18  BP: (132-189)/() 137/75  Physical Exam  General: Nontoxic, no acute distress  Psych: Calm and cooperative  Neck: No JVD appreciated  HEENT: NC/AT, normal sclera  Cardiovascular: Normal rate, regular rhythm, pulses equal bilaterally  Respiratory: Diminished breath sounds bilaterally, no wheezes  Abdomen: Obese, mild distention, nontender, bowel sounds present  Neurologic: Nonfocal, follows commands, normal speech  Extremities: Bilateral extremity pitting edema, no clubbing or cyanosis  Skin: Warm and dry, well perfused        Results Review:  I have reviewed the labs, radiology results, and diagnostic studies.    Laboratory Data:   Results from last 7 days   Lab Units 09/08/22  0426 09/07/22  1544   WBC 10*3/mm3 8.39 7.75   HEMOGLOBIN g/dL 12.1 13.7    HEMATOCRIT % 36.0 38.9   PLATELETS 10*3/mm3 153 174        Results from last 7 days   Lab Units 09/08/22  0426 09/07/22  1544   SODIUM mmol/L 142 141   POTASSIUM mmol/L 3.4* 3.2*   CHLORIDE mmol/L 101 103   CO2 mmol/L 31.0* 29.0   BUN mg/dL 9 7   CREATININE mg/dL 0.62 0.63   CALCIUM mg/dL 9.2 9.5   BILIRUBIN mg/dL 0.4 0.6   ALK PHOS U/L 98 111   ALT (SGPT) U/L 9 12   AST (SGOT) U/L 16 19   GLUCOSE mg/dL 118* 97       Culture Data:   No results found for: BLOODCX  No results found for: URINECX  No results found for: RESPCX  No results found for: WOUNDCX  No results found for: STOOLCX  No components found for: BODYFLD    Radiology Data:   Imaging Results (Last 24 Hours)     Procedure Component Value Units Date/Time    CT Angiogram Chest [[id number]] Collected: 09/07/22 1750     Updated: 09/07/22 1811    Narrative:      EXAM:  CT CHEST ANGIOGRAPHY WITH IV CONTRAST    ORDERING PROVIDER:  BOB RICH    CLINICAL HISTORY:  Shortness of breath    COMPARISON:      TECHNIQUE:   Chest CT was performed using a high resolution pulmonary  angiogram protocol with 69ml of Isovue-370 contrast and  reformatted in the sagittal and coronal planes.     3-dimensional images also acquired with special processing of the  CT scan data with a specialized workstation for evaluation.    This examination was performed according to our departmental dose  optimization program which includes automated exposure control,  adjustment of the MA and kV according to patient size, and/or use  of iterative reconstruction technique.     FINDINGS:     LUNGS AND PLEURA: Mild consolidation in the bilateral posterior  lower lobes, which may be due to atelectasis versus infiltrate.  No significant pleural effusion. Heterogeneous appearance of the  lung parenchyma due to sequelae of small airway disease, such as  asthma, among other etiologies.    HEART: Prominence in size and unremarkable configuration. No  pericardial effusion.     MEDIASTINUM AND  BONITA: No significant adenopathy.     AORTA AND GREAT VESSELS: No aneurysm or dissection.     PULMONARY ARTERIES: No filling defects in the central portion.  Assessment of branches distal to the segmental branches are  limited due to suboptimal contrast.    UPPER ABDOMEN: Diffuse fatty change of liver with splenomegaly..    MUSCULOSKELETAL:  No aggressive osseous lesion. Normal vertebral  body height and alignment. No lytic or sclerotic lesion.     EXTRATHORACIC SOFT TISSUES: No axillary adenopathy. No mass.  Unremarkable supraclavicular soft tissues.       Impression:      No evidence of central pulmonary embolism.  Assessment of branches of pulmonary artery distal to the  segmental branches are limited due to suboptimal contrast.  Mild consolidation in the bilateral posterior lower lobes, which  may be due to atelectasis versus infiltrate.   Heterogeneous appearance of the lung parenchyma due to sequelae  of small airway disease, such as asthma, among other etiologies.  Diffuse fatty change of visualized liver with splenomegaly..    Electronically signed by:  Jye Main MD  9/7/2022 6:09 PM CDT  Workstation: [id number]    US Venous Doppler Lower Extremity Bilateral (duplex) [[id number]] Collected: 09/07/22 1618     Updated: 09/07/22 1657    Narrative:      EXAM: US LOWER EXTREMITY VEINS BILATERAL    HISTORY: bilat lower ext edema, left greater than right    COMPARISON STUDY:      TECHNIQUE:  Grayscale, duplex and color Doppler sonogram of the bilateral  lower extremities was obtained.     FINDINGS:    There is complete coaptation with graded compression at all  visualized levels from the common femoral vein to the calf veins.  There is augmentation of the venous waveform with calf  compression and respiratory variation seen at appropriate levels.   No filling defect is seen.   No Baker's cyst seen.     Unremarkable bilateral greater saphenous veins.      Impression:      IMPRESSION  1.  No evidence of deep venous  thrombosis of the bilateral lower  extremities.    Electronically signed by:  Jey Main MD  9/7/2022 4:54 PM CDT  Workstation: [id number]    XR Chest 1 View [[id number]] Collected: 09/07/22 1440     Updated: 09/07/22 1503    Narrative:      EXAM: XR CHEST 1 VIEW    HISTORY: shortness of breath    COMPARISON: 4/28/2021    TECHNIQUE:   Portable view of the chest.    FINDINGS:   Mild to moderate pulmonary vascular congestion increased since  prior. There is cardiomegaly.  The lungs are otherwise well aerated. There is no significant  pleural effusion. The mediastinal contours are within normal  limits. .  No evidence of mediastinal shift or pneumothorax.  Unremarkable visualized osseous structures.      Impression:      1.  Mild to moderate pulmonary vascular congestion increased  since prior.   2.  There is cardiomegaly.          Electronically signed by:  Jey Main MD  9/7/2022 3:00 PM CDT  Workstation: [id number]          I have reviewed the patient's current medications.     Assessment/Plan     Active Hospital Problems    Diagnosis    • **Acute decompensated heart failure (HCC)    • Essential hypertension    • Mixed hyperlipidemia        Acute decompensated heart failure, volume overload  - Monitor telemetry  - Follow-up echocardiogram  --Further medication recommendations following results of echo  - Continue IV Lasix 40 mg every 12 hours for now, good response thus far, I's and O's, goal net negative fluid balance    Lactic acidosis likely secondary to hypoperfusion from heart failure  - Lactate trended with improvement following diuresis    Hypokalemia  - Monitor and replete electrolytes    Hypertension  - Monitor BP, continue valsartan, as needed antihypertensives if warranted, further adjustment to regimen pending clinical course    Hyperlipidemia  - Statin    DVT prophylaxis Lovenox    Electronically signed by Skyler Porter MD, 09/08/22, 15:02 CDT.

## 2022-09-08 NOTE — ED NOTES
Pt ambulated for minute and half. Pt very short of air throughout duration. O2 was 97% entire time with hr being 129. Pt attempted to ambulate once more but only lasted 30 seconds, returning to her seat.

## 2022-09-08 NOTE — PLAN OF CARE
Goal Outcome Evaluation:      Pt continues to report pain but says she is seen at the pain clinic for the same pain. Norco changed to her normal frequency at home and one time morphine given. Pt had good output with lasix.

## 2022-09-08 NOTE — ED NOTES
Nursing report ED to floor  Haven Mahoney  56 y.o.  female    HPI:   Chief Complaint   Patient presents with   • Shortness of Breath     Swelling          Admitting doctor:   Saeid Behroozi, MD    Consulting provider(s):  Consults     No orders found from 8/9/2022 to 9/8/2022.           Admitting diagnosis:   The primary encounter diagnosis was Congestive heart failure, unspecified HF chronicity, unspecified heart failure type (HCC). A diagnosis of Dyspnea on exertion was also pertinent to this visit.    Code status:   Current Code Status     Date Active Code Status Order ID Comments User Context       9/7/2022 2047 CPR (Attempt to Resuscitate) [id number]  Behroozi, Saeid, MD ED     Advance Care Planning Activity      Questions for Current Code Status     Question Answer    Code Status (Patient has no pulse and is not breathing) CPR (Attempt to Resuscitate)    Medical Interventions (Patient has pulse or is breathing) Full Support          Allergies:   Amoxicillin-pot clavulanate, Clarithromycin, Ibuprofen, Naproxen, Nsaids, and Other    Intake and Output  No intake or output data in the 24 hours ending 09/07/22 2105    Weight:       09/07/22  1424   Weight: 117 kg (258 lb 4.8 oz)       Most recent vitals:   Vitals:    09/07/22 1937 09/07/22 1951 09/07/22 2045 09/07/22 2105   BP:  164/78 163/77    BP Location:       Patient Position:       Pulse: 82  94    Resp:  20  20   Temp:       TempSrc:       SpO2: 100%  96%    Weight:       Height:           Active LDAs/IV Access:   Lines, Drains & Airways     Active LDAs     Name Placement date Placement time Site Days    Peripheral IV 09/07/22 1553 Right Forearm 09/07/22  1553  Forearm  less than 1                Labs (abnormal labs have a star):   Labs Reviewed   COMPREHENSIVE METABOLIC PANEL - Abnormal; Notable for the following components:       Result Value    Potassium 3.2 (*)     All other components within normal limits    Narrative:     GFR Normal >60  Chronic  Kidney Disease <60  Kidney Failure <15     D-DIMER, QUANTITATIVE - Abnormal; Notable for the following components:    D-Dimer, Quantitative 830 (*)     All other components within normal limits    Narrative:     Dimer values <500 ng/ml FEU are FDA approved as aid in diagnosis of deep venous thrombosis and pulmonary embolism.  This test should not be used in an exclusion strategy with pretest probability alone.    A recent guideline regarding diagnosis for pulmonary thromboembolism recommends an adjusted exclusion criterion of age x 10 ng/ml FEU for patients >50 years of age ([employer] 2015; 163: 701-711).     URINALYSIS W/ MICROSCOPIC IF INDICATED (NO CULTURE) - Abnormal; Notable for the following components:    Blood, UA Small (1+) (*)     All other components within normal limits   CBC WITH AUTO DIFFERENTIAL - Abnormal; Notable for the following components:    MCV 78.1 (*)     Immature Grans % 0.8 (*)     Immature Grans, Absolute 0.06 (*)     All other components within normal limits   URINALYSIS, MICROSCOPIC ONLY - Abnormal; Notable for the following components:    RBC, UA 0-2 (*)     All other components within normal limits   COVID-19 AND FLU A/B, NP SWAB IN TRANSPORT MEDIA 8-12 HR TAT - Normal    Narrative:     Fact sheet for providers: https://www.fda.gov/media/222770/download    Fact sheet for patients: https://www.fda.gov/media/963191/download    Test performed by PCR.   TROPONIN (IN-HOUSE) - Normal    Narrative:     Troponin T Reference Range:  <= 0.03 ng/mL-   Negative for AMI  >0.03 ng/mL-     Abnormal for myocardial necrosis.  Clinicians would have to utilize clinical acumen, EKG, Troponin and serial changes to determine if it is an Acute Myocardial Infarction or myocardial injury due to an underlying chronic condition.       Results may be falsely decreased if patient taking Biotin.     TROPONIN (IN-HOUSE) - Normal    Narrative:     Troponin T Reference Range:  <= 0.03 ng/mL-   Negative for  AMI  >0.03 ng/mL-     Abnormal for myocardial necrosis.  Clinicians would have to utilize clinical acumen, EKG, Troponin and serial changes to determine if it is an Acute Myocardial Infarction or myocardial injury due to an underlying chronic condition.       Results may be falsely decreased if patient taking Biotin.     BNP (IN-HOUSE) - Normal    Narrative:     Among patients with dyspnea, NT-proBNP is highly sensitive for the detection of acute congestive heart failure. In addition NT-proBNP of <300 pg/ml effectively rules out acute congestive heart failure with 99% negative predictive value.    Results may be falsely decreased if patient taking Biotin.     MAGNESIUM - Normal   BLOOD CULTURE   BLOOD CULTURE   RESPIRATORY CULTURE   RESPIRATORY PANEL PCR W/ COVID-19 (SARS-COV-2) DON/VANIA/VENICE/PAD/COR/MAD/LISSY IN-HOUSE, NP SWAB IN [facility], 3-4 HR TAT   LACTIC ACID, PLASMA   CBC AND DIFFERENTIAL    Narrative:     The following orders were created for panel order CBC & Differential.  Procedure                               Abnormality         Status                     ---------                               -----------         ------                     CBC Auto Differential[[id number]]        Abnormal            Final result                 Please view results for these tests on the individual orders.   EXTRA TUBES    Narrative:     The following orders were created for panel order Extra Tubes.  Procedure                               Abnormality         Status                     ---------                               -----------         ------                     Gold Top - SST[[id number]]                                                                Please view results for these tests on the individual orders.   GOLD TOP - SST       Meds given in ED:   Medications   sodium chloride 0.9 % flush 10 mL (has no administration in time range)   ipratropium-albuterol (DUO-NEB) nebulizer solution 3 mL ( Nebulization Canceled  Entry 9/7/22 1930)   fluticasone (FLONASE) 50 MCG/ACT nasal spray 1 spray (has no administration in time range)   gabapentin (NEURONTIN) capsule 300 mg (has no administration in time range)   HYDROcodone-acetaminophen (NORCO) 7.5-325 MG per tablet 1 tablet (has no administration in time range)   cetirizine (zyrTEC) tablet 10 mg (has no administration in time range)   rosuvastatin (CRESTOR) tablet 5 mg (has no administration in time range)   valsartan (DIOVAN) tablet 160 mg (has no administration in time range)   sodium chloride 0.9 % flush 10 mL (has no administration in time range)   sodium chloride 0.9 % flush 10 mL (has no administration in time range)   Enoxaparin Sodium (LOVENOX) syringe 40 mg (has no administration in time range)   potassium chloride (MICRO-K) CR capsule 40 mEq (has no administration in time range)   cefTRIAXone (ROCEPHIN) 1 g/100 mL 0.9% NS (MBP) (has no administration in time range)   doxycycline (VIBRAMYCIN) 100 mg/100 mL 0.9% NS MBP (has no administration in time range)   potassium chloride (MICRO-K) CR capsule 40 mEq (has no administration in time range)     Or   potassium chloride (KLOR-CON) packet 40 mEq (has no administration in time range)     Or   potassium chloride 10 mEq in 100 mL IVPB (has no administration in time range)   furosemide (LASIX) injection 40 mg (has no administration in time range)   HYDROcodone-acetaminophen (NORCO) 7.5-325 MG per tablet 1 tablet (1 tablet Oral Given 9/7/22 1642)   iopamidol (ISOVUE-370) 76 % injection 100 mL (69 mL Intravenous Given 9/7/22 1755)   potassium chloride (MICRO-K) CR capsule 40 mEq (40 mEq Oral Given 9/7/22 1857)   furosemide (LASIX) injection 40 mg (40 mg Intravenous Given 9/7/22 1857)   valsartan (DIOVAN) tablet 320 mg (320 mg Oral Given 9/7/22 1911)   HYDROmorphone (DILAUDID) injection 0.5 mg (0.5 mg Intravenous Given 9/7/22 1911)           NIH Stroke Scale:       Isolation/Infection(s):  No active isolations   COVID (rule out)      COVID Testing  Collected -yes 9/7/22  Resulted - yes, negative     Nursing report ED to floor:  Mental status: A&Ox4  Ambulatory status: ad katherine  Precautions: NA    ED nurse phone extentsion- [phone]

## 2022-09-08 NOTE — CONSULTS
"Adult Nutrition  Assessment    Patient Name:  Haven Mahoney  YOB: 1965  MRN: [id number]  Admit Date:  9/7/2022    Assessment Date:  9/8/2022    Comments:  Pt admitted with acute decompensated heart failure and RD staff seeing for heart health edu.  Pt states appetite is normal and eating of normal amount (usually does not eat breakfast).  Denies chewing/swallowing difficulty, food allergies, or change in BMs. States she felt sick when potassium was given but other than that no n/v.  Has lost wt with lasix given.  #.  Edema charted in flowsheet as 2+ in ankles and 1+ in feet.  Heart healthy diet edu handouts reviewed and given.  Pt has no questions at this time.  Pt was engaged during edu.  Menu provided.  RD to follow hospital course.      Reason for Assessment     Row Name 09/08/22 1609          Reason for Assessment    Reason For Assessment identified at risk by screening criteria     Identified At Risk by Screening Criteria need for education                Nutrition/Diet History     Row Name 09/08/22 1609          Nutrition/Diet History    Typical Intake (Food/Fluid/EN/PN) Normal appetite, eating of normal amount.  states she does not usually eat breakfast.                Labs/Tests/Procedures/Meds     Row Name 09/08/22 1610          Labs/Procedures/Meds    Lab Results Reviewed reviewed, pertinent     Lab Results Comments K 3.4, Glu 118, A1c 5.9            Diagnostic Tests/Procedures    Diagnostic Test/Procedure Reviewed reviewed, pertinent            Medications    Pertinent Medications Reviewed reviewed, pertinent     Pertinent Medications Comments lasix, crestor                Physical Findings     Row Name 09/08/22 1610          Physical Findings    Overall Physical Appearance alert, laying in bed                Estimated/Assessed Needs - Anthropometrics     Row Name 09/08/22 1611 09/08/22 1245       Anthropometrics    Height 152.4 cm (60\") 152.4 cm (60\")    Weight 113 kg (249 lb " 1.9 oz) 113 kg (249 lb 1.9 oz)    Weight for Calculation 45.5 kg (100 lb 5 oz)  IBW --       Estimated/Assessed Needs    Additional Documentation KCAL/KG (Group);Protein Requirements (Group) --       KCAL/KG    KCAL/KG 30 Kcal/Kg (kcal);35 Kcal/Kg (kcal) --    30 Kcal/Kg (kcal) 1365 --    35 Kcal/Kg (kcal) 1592.5 --       Protein Requirements    Weight Used For Protein Calculations 45.5 kg (100 lb 5 oz) --    Est Protein Requirement Amount (gms/kg) 1.0 gm protein --    Estimated Protein Requirements (gms/day) 45.5 --               Nutrition Prescription Ordered     Row Name 09/08/22 1611          Nutrition Prescription PO    Current PO Diet Regular     Common Modifiers Cardiac;Low Sodium                     Electronically signed by:  Taya Oro RD  09/08/22 16:14 CDT

## 2022-09-08 NOTE — H&P
[facility] Medicine Admission      Date of Admission: 9/7/2022      Primary Care Physician: Asif iLn MD      Chief Complaint: Shortness of breath    HPI:    Patient is a morbidly obese 56-year-old female with BMI of 48 with known past medical history of hypertension chronic back pain on narcotics, anxiety osteoporosis, seizures, pancreatitis who presented to ER with complaint of retaining fluid for 1 to 2 weeks associated with shortness of breath for 3 days.  Patient was given diuretic in ED.  CTA of the chest was negative for obvious PE.  Hospitalist service was called for observation of the patient.    Patient was seen and examined in ED room 4.  Patient reports for the last 2 weeks she noticed worsening swelling to her bilateral lower extremities.  She denies taking any diuretics outpatient.  For the last 3 days she had worsening dyspnea on exertion, shortness of breath at rest, and was feeling generally weak and tired.  She had some chest tightness but denies any particular chest pain.  She denies any fall injury trauma fever chills headache sore throat anginal-like chest pain, syncope near syncope palpitation abdominal pain, nausea vomiting diarrhea constipation, bleeding, UTI like symptoms in particular.  She reports 15 pounds weight gain.    Concurrent Medical History:  has a past medical history of Anxiety, Arthritis, Arthropathy of lumbar facet joint, Callus, Chronic depression, Degeneration of lumbar intervertebral disc, Drug therapy, Epilepsy (HCC), Foot pain, left, Headache, Hearing loss, Heart problem, High blood pressure, Hypercholesterolemia, Hypertensive disorder, Obesity, Osteoporosis, Osteoporosis, Pancreatitis, Seizures (HCC), Varicose veins of lower extremity, and Wears glasses.    Past Surgical History:  has a past surgical history that includes Cardiac catheterization (06/05/2013); Other surgical history (05/05/2016); Injection of Medication  (03/28/2016); Appendectomy; Cholecystectomy; incision and drainage leg (Left, 1/29/2021); and incision and drainage leg (Left, 3/4/2021).    Family History: family history includes Asthma in her mother; Cancer in her father, maternal grandmother, maternal uncle, and mother; Diabetes in her maternal grandmother and mother; Heart disease in her maternal grandfather and mother; Hyperlipidemia in her father and mother; Mental illness in an other family member; Migraines in her mother; Osteoporosis in her mother.     Social History:  reports that she has quit smoking. She has never used smokeless tobacco. She reports that she does not drink alcohol and does not use drugs.    Allergies:   Allergies   Allergen Reactions   • Amoxicillin-Pot Clavulanate Hives   • Clarithromycin Hives   • Ibuprofen Hives and Nausea And Vomiting   • Naproxen Hives and Nausea And Vomiting   • Nsaids Hives   • Other Other (See Comments)     Hx of MRSA       Medications:   Prior to Admission medications    Medication Sig Start Date End Date Taking? Authorizing Provider   gabapentin (NEURONTIN) 300 MG capsule Take 300 mg by mouth 3 (Three) Times a Day. 7/26/22  Yes Concepción Hancock MD   HYDROcodone-acetaminophen (NORCO) 7.5-325 MG per tablet Take 1 tablet by mouth. 7/29/22  Yes Concepción Hancock MD   Loratadine 10 MG capsule Take 10 mg by mouth Daily.   Yes Concepción Hancock MD   rosuvastatin (CRESTOR) 5 MG tablet Take 5 mg by mouth. 5/20/22 5/21/23 Yes Concepción Hancock MD   valsartan (Diovan) 320 MG tablet Take 0.5 tablets by mouth Daily. 5/3/21  Yes Domingo Hudson MD   Famotidine (PEPCID AC PO) Take 20 mg by mouth Daily.    Concepción Hancock MD   fluticasone (FLONASE) 50 MCG/ACT nasal spray 1 spray into the nostril(s) as directed by provider As Needed.    Concepción Hancock MD   furosemide (LASIX) 20 MG tablet Take 20 mg by mouth. 3/15/21 3/31/21  Concepción Hancock MD   gabapentin (NEURONTIN) 300 MG capsule Take  300 mg by mouth Every Night. Tabs 2 12/23/16   Provider, MD Concepción   HYDROcodone-acetaminophen (NORCO) 7.5-325 MG per tablet  3/11/21   Concepción Hancock MD   sucralfate (CARAFATE) 1 g tablet  12/22/20   Provider, MD Concepción       Review of Systems:  Review of Systems   Constitutional: Positive for activity change and fatigue. Negative for chills, diaphoresis and fever.   HENT: Negative for congestion, dental problem, ear pain, facial swelling, rhinorrhea and sinus pressure.    Eyes: Negative for photophobia, discharge, redness, itching and visual disturbance.   Respiratory: Positive for chest tightness and shortness of breath. Negative for apnea, cough, choking, wheezing and stridor.    Cardiovascular: Positive for leg swelling. Negative for chest pain and palpitations.   Gastrointestinal: Negative for abdominal distention, abdominal pain, anal bleeding, blood in stool, diarrhea, nausea, rectal pain and vomiting.   Endocrine: Negative for cold intolerance, heat intolerance, polydipsia, polyphagia and polyuria.   Genitourinary: Negative for difficulty urinating, flank pain, frequency, hematuria and urgency.   Musculoskeletal: Negative for arthralgias, back pain, joint swelling and myalgias.   Skin: Negative for pallor, rash and wound.   Allergic/Immunologic: Negative for environmental allergies and immunocompromised state.   Neurological: Negative for dizziness, tremors, seizures, facial asymmetry, speech difficulty, weakness, light-headedness, numbness and headaches.   Hematological: Negative for adenopathy. Does not bruise/bleed easily.   Psychiatric/Behavioral: Negative for agitation, behavioral problems and hallucinations. The patient is not nervous/anxious.       Otherwise complete ROS is negative except as mentioned above.    Physical Exam:   Temp:  [97.9 °F (36.6 °C)] 97.9 °F (36.6 °C)  Heart Rate:  [80-96] 82  Resp:  [16-26] 20  BP: (164-206)/() 164/78  Physical Exam  Constitutional:        General: She is not in acute distress.     Appearance: She is obese. She is not ill-appearing, toxic-appearing or diaphoretic.   HENT:      Head: Normocephalic and atraumatic.      Right Ear: External ear normal.      Left Ear: External ear normal.      Nose: Nose normal.      Mouth/Throat:      Mouth: Mucous membranes are moist.      Pharynx: Oropharynx is clear.   Eyes:      Extraocular Movements: Extraocular movements intact.      Conjunctiva/sclera: Conjunctivae normal.      Pupils: Pupils are equal, round, and reactive to light.   Cardiovascular:      Rate and Rhythm: Regular rhythm. Tachycardia present.      Heart sounds: No murmur heard.    No friction rub. No gallop.   Pulmonary:      Effort: No respiratory distress.      Breath sounds: No stridor. No wheezing or rales.      Comments: Decreased breath sounds  Chest:      Chest wall: No tenderness.   Abdominal:      General: Abdomen is flat. There is no distension.      Palpations: Abdomen is soft.      Tenderness: There is no abdominal tenderness. There is no guarding or rebound.      Comments: Large abdominal pannus   Musculoskeletal:         General: No swelling or tenderness.      Cervical back: No rigidity or tenderness.      Right lower leg: Edema (mild) present.      Left lower leg: Left lower leg edema: mild.   Lymphadenopathy:      Cervical: No cervical adenopathy.   Skin:     General: Skin is warm and dry.      Coloration: Skin is not jaundiced.      Findings: No erythema.   Neurological:      Mental Status: She is alert and oriented to person, place, and time. Mental status is at baseline.      Sensory: No sensory deficit.      Motor: No weakness.      Coordination: Coordination normal.   Psychiatric:         Behavior: Behavior normal.         Judgment: Judgment normal.      Comments: Anxious           Results Reviewed:  I have personally reviewed current lab, radiology, and data and agree with results.  Lab Results (last 24 hours)     Procedure  Component Value Units Date/Time    Troponin [[id number]]  (Normal) Collected: 09/07/22 1805    Specimen: Blood Updated: 09/07/22 1833     Troponin T <0.010 ng/mL     Narrative:      Troponin T Reference Range:  <= 0.03 ng/mL-   Negative for AMI  >0.03 ng/mL-     Abnormal for myocardial necrosis.  Clinicians would have to utilize clinical acumen, EKG, Troponin and serial changes to determine if it is an Acute Myocardial Infarction or myocardial injury due to an underlying chronic condition.       Results may be falsely decreased if patient taking Biotin.      Magnesium [[id number]]  (Normal) Collected: 09/07/22 1544    Specimen: Blood Updated: 09/07/22 1759     Magnesium 1.8 mg/dL     COVID-19 and FLU A/B PCR - Swab, Nasopharynx [[id number]]  (Normal) Collected: 09/07/22 1642    Specimen: Swab from Nasopharynx Updated: 09/07/22 1732     COVID19 Not Detected     Influenza A PCR Not Detected     Influenza B PCR Not Detected    Narrative:      Fact sheet for providers: https://www.fda.gov/media/334958/download    Fact sheet for patients: https://www.fda.gov/media/146845/download    Test performed by PCR.    D-dimer, Quantitative [[id number]]  (Abnormal) Collected: 09/07/22 1544    Specimen: Blood Updated: 09/07/22 1615     D-Dimer, Quantitative 830 ng/mL (FEU)     Narrative:      Dimer values <500 ng/ml FEU are FDA approved as aid in diagnosis of deep venous thrombosis and pulmonary embolism.  This test should not be used in an exclusion strategy with pretest probability alone.    A recent guideline regarding diagnosis for pulmonary thromboembolism recommends an adjusted exclusion criterion of age x 10 ng/ml FEU for patients >50 years of age ([employer] Intern Med 2015; 163: 701-711).      Comprehensive Metabolic Panel [[id number]]  (Abnormal) Collected: 09/07/22 1544    Specimen: Blood Updated: 09/07/22 1609     Glucose 97 mg/dL      BUN 7 mg/dL      Creatinine 0.63 mg/dL      Sodium 141 mmol/L      Potassium 3.2 mmol/L       Chloride 103 mmol/L      CO2 29.0 mmol/L      Calcium 9.5 mg/dL      Total Protein 7.3 g/dL      Albumin 4.10 g/dL      ALT (SGPT) 12 U/L      AST (SGOT) 19 U/L      Alkaline Phosphatase 111 U/L      Total Bilirubin 0.6 mg/dL      Globulin 3.2 gm/dL      A/G Ratio 1.3 g/dL      BUN/Creatinine Ratio 11.1     Anion Gap 9.0 mmol/L      eGFR 103.6 mL/min/1.73      Comment: National Kidney Foundation and American Society of Nephrology (ASN) Task Force recommended calculation based on the Chronic Kidney Disease Epidemiology Collaboration (CKD-EPI) equation refit without adjustment for race.       Narrative:      GFR Normal >60  Chronic Kidney Disease <60  Kidney Failure <15      Troponin [[id number]]  (Normal) Collected: 09/07/22 1544    Specimen: Blood Updated: 09/07/22 1609     Troponin T <0.010 ng/mL     Narrative:      Troponin T Reference Range:  <= 0.03 ng/mL-   Negative for AMI  >0.03 ng/mL-     Abnormal for myocardial necrosis.  Clinicians would have to utilize clinical acumen, EKG, Troponin and serial changes to determine if it is an Acute Myocardial Infarction or myocardial injury due to an underlying chronic condition.       Results may be falsely decreased if patient taking Biotin.      BNP [[id number]]  (Normal) Collected: 09/07/22 1544    Specimen: Blood Updated: 09/07/22 1608     proBNP 586.0 pg/mL     Narrative:      Among patients with dyspnea, NT-proBNP is highly sensitive for the detection of acute congestive heart failure. In addition NT-proBNP of <300 pg/ml effectively rules out acute congestive heart failure with 99% negative predictive value.    Results may be falsely decreased if patient taking Biotin.      Urinalysis, Microscopic Only - Urine, Clean Catch [[id number]]  (Abnormal) Collected: 09/07/22 1545    Specimen: Urine, Clean Catch Updated: 09/07/22 1600     RBC, UA 0-2 /HPF      WBC, UA 0-2 /HPF      Bacteria, UA None Seen /HPF      Squamous Epithelial Cells, UA 0-2 /HPF      Hyaline Casts, UA  None Seen /LPF      Methodology Automated Microscopy    Urinalysis With Microscopic If Indicated (No Culture) - Urine, Clean Catch [[id number]]  (Abnormal) Collected: 09/07/22 1545    Specimen: Urine, Clean Catch Updated: 09/07/22 1559     Color, UA Yellow     Appearance, UA Clear     pH, UA 7.0     Specific [place], UA 1.004     Comment: Result obtained by Refractometer        Glucose, UA Negative     Ketones, UA Negative     Bilirubin, UA Negative     Blood, UA Small (1+)     Protein, UA Negative     Leuk Esterase, UA Negative     Nitrite, UA Negative     Urobilinogen, UA 0.2 E.U./dL    CBC & Differential [[id number]]  (Abnormal) Collected: 09/07/22 1544    Specimen: Blood Updated: 09/07/22 1552    Narrative:      The following orders were created for panel order CBC & Differential.  Procedure                               Abnormality         Status                     ---------                               -----------         ------                     CBC Auto Differential[[id number]]        Abnormal            Final result                 Please view results for these tests on the individual orders.    CBC Auto Differential [[id number]]  (Abnormal) Collected: 09/07/22 1544    Specimen: Blood Updated: 09/07/22 1552     WBC 7.75 10*3/mm3      RBC 4.98 10*6/mm3      Hemoglobin 13.7 g/dL      Hematocrit 38.9 %      MCV 78.1 fL      MCH 27.5 pg      MCHC 35.2 g/dL      RDW 13.2 %      RDW-SD 37.4 fl      MPV 9.7 fL      Platelets 174 10*3/mm3      Neutrophil % 69.6 %      Lymphocyte % 22.6 %      Monocyte % 6.1 %      Eosinophil % 0.5 %      Basophil % 0.4 %      Immature Grans % 0.8 %      Neutrophils, Absolute 5.40 10*3/mm3      Lymphocytes, Absolute 1.75 10*3/mm3      Monocytes, Absolute 0.47 10*3/mm3      Eosinophils, Absolute 0.04 10*3/mm3      Basophils, Absolute 0.03 10*3/mm3      Immature Grans, Absolute 0.06 10*3/mm3      nRBC 0.0 /100 WBC         Imaging Results (Last 24 Hours)     Procedure Component Value  Units Date/Time    CT Angiogram Chest [[id number]] Collected: 09/07/22 1750     Updated: 09/07/22 1811    Narrative:      EXAM:  CT CHEST ANGIOGRAPHY WITH IV CONTRAST    ORDERING PROVIDER:  BOB RICH    CLINICAL HISTORY:  Shortness of breath    COMPARISON:      TECHNIQUE:   Chest CT was performed using a high resolution pulmonary  angiogram protocol with 69ml of Isovue-370 contrast and  reformatted in the sagittal and coronal planes.     3-dimensional images also acquired with special processing of the  CT scan data with a specialized workstation for evaluation.    This examination was performed according to our departmental dose  optimization program which includes automated exposure control,  adjustment of the MA and kV according to patient size, and/or use  of iterative reconstruction technique.     FINDINGS:     LUNGS AND PLEURA: Mild consolidation in the bilateral posterior  lower lobes, which may be due to atelectasis versus infiltrate.  No significant pleural effusion. Heterogeneous appearance of the  lung parenchyma due to sequelae of small airway disease, such as  asthma, among other etiologies.    HEART: Prominence in size and unremarkable configuration. No  pericardial effusion.     MEDIASTINUM AND BONITA: No significant adenopathy.     AORTA AND GREAT VESSELS: No aneurysm or dissection.     PULMONARY ARTERIES: No filling defects in the central portion.  Assessment of branches distal to the segmental branches are  limited due to suboptimal contrast.    UPPER ABDOMEN: Diffuse fatty change of liver with splenomegaly..    MUSCULOSKELETAL:  No aggressive osseous lesion. Normal vertebral  body height and alignment. No lytic or sclerotic lesion.     EXTRATHORACIC SOFT TISSUES: No axillary adenopathy. No mass.  Unremarkable supraclavicular soft tissues.       Impression:      No evidence of central pulmonary embolism.  Assessment of branches of pulmonary artery distal to the  segmental branches are limited  due to suboptimal contrast.  Mild consolidation in the bilateral posterior lower lobes, which  may be due to atelectasis versus infiltrate.   Heterogeneous appearance of the lung parenchyma due to sequelae  of small airway disease, such as asthma, among other etiologies.  Diffuse fatty change of visualized liver with splenomegaly..    Electronically signed by:  Jey Main MD  9/7/2022 6:09 PM CDT  Workstation: [id number]    US Venous Doppler Lower Extremity Bilateral (duplex) [[id number]] Collected: 09/07/22 1618     Updated: 09/07/22 1657    Narrative:      EXAM: US LOWER EXTREMITY VEINS BILATERAL    HISTORY: bilat lower ext edema, left greater than right    COMPARISON STUDY:      TECHNIQUE:  Grayscale, duplex and color Doppler sonogram of the bilateral  lower extremities was obtained.     FINDINGS:    There is complete coaptation with graded compression at all  visualized levels from the common femoral vein to the calf veins.  There is augmentation of the venous waveform with calf  compression and respiratory variation seen at appropriate levels.   No filling defect is seen.   No Baker's cyst seen.     Unremarkable bilateral greater saphenous veins.      Impression:      IMPRESSION  1.  No evidence of deep venous thrombosis of the bilateral lower  extremities.    Electronically signed by:  Jey Main MD  9/7/2022 4:54 PM CDT  Workstation: [id number]    XR Chest 1 View [[id number]] Collected: 09/07/22 1440     Updated: 09/07/22 1503    Narrative:      EXAM: XR CHEST 1 VIEW    HISTORY: shortness of breath    COMPARISON: 4/28/2021    TECHNIQUE:   Portable view of the chest.    FINDINGS:   Mild to moderate pulmonary vascular congestion increased since  prior. There is cardiomegaly.  The lungs are otherwise well aerated. There is no significant  pleural effusion. The mediastinal contours are within normal  limits. .  No evidence of mediastinal shift or pneumothorax.  Unremarkable visualized osseous structures.       Impression:      1.  Mild to moderate pulmonary vascular congestion increased  since prior.   2.  There is cardiomegaly.          Electronically signed by:  Jey Main MD  9/7/2022 3:00 PM CDT  Workstation: [id number]            Assessment:      # Shortness of breath, dyspnea  # Fluid overload, mild  # Pneumonia, CAP cannot be excluded  # Uncontrolled hypertension  # Hypokalemia  # Hyperlipidemia  # Chronic narcotic use, chronic back pain  # Morbid  obesity with BMI of 48        Plan:    Place on telemetry  Obtain further laboratory work-up  Obtain serial EKG and cardiac enzyme for reassurance  Obtain echocardiogram  Her proBNP is not elevated but her morbid obesity may mask her BNP level, echo ordered.  Patient received dose of Lasix in ED. placed on Lasix 40 mg daily for immediate care  Place on antibiotic pending further evaluation concerning potential pneumonia considering CTA chest results  NIPPV, supplemental O2 as needed  Replace potassium and place on electrolyte replacement protocol  Continue home medication valsartan for blood pressure.  Lasix as above.  Placed on labetalol IV and hydralazine IV as needed for correction of significantly elevated blood pressures.  Comorbidities will be treated appropriately  Reconcile home medication and continue with essential home medication  Medically supervised weight reduction outpatient is strongly recommended, as evaluation outpatient for obstructive sleep apnea.  DVT and GI prophylaxis will be initiated  Please see orders for comprehensive plan    I confirmed that the patient's Advance Care Plan is present, code status is documented, or surrogate decision maker is listed in the patient's medical record.     I have utilized all available immediate resources to obtain, update, or review the patient's current medications.     I discussed the patient's findings and my recommendations with: Patient and she agreed with above plan of care.      Saeid Behroozi, MD   09/07/22    20:38 CDT

## 2022-09-09 LAB
ANION GAP SERPL CALCULATED.3IONS-SCNC: 11 MMOL/L (ref 5–15)
BASOPHILS # BLD AUTO: 0.06 10*3/MM3 (ref 0–0.2)
BASOPHILS NFR BLD AUTO: 0.7 % (ref 0–1.5)
BH CV ECHO MEAS - ACS: 1.27 CM
BH CV ECHO MEAS - AO MAX PG: 33.8 MMHG
BH CV ECHO MEAS - AO MEAN PG: 19.7 MMHG
BH CV ECHO MEAS - AO ROOT DIAM: 3 CM
BH CV ECHO MEAS - AO V2 MAX: 290.5 CM/SEC
BH CV ECHO MEAS - AO V2 VTI: 51.7 CM
BH CV ECHO MEAS - AVA(I,D): 1.2 CM2
BH CV ECHO MEAS - EDV(CUBED): 78.8 ML
BH CV ECHO MEAS - EDV(MOD-SP2): 80.5 ML
BH CV ECHO MEAS - EDV(MOD-SP4): 76.1 ML
BH CV ECHO MEAS - EF(MOD-BP): 66 %
BH CV ECHO MEAS - EF(MOD-SP2): 64.7 %
BH CV ECHO MEAS - EF(MOD-SP4): 68.9 %
BH CV ECHO MEAS - ESV(CUBED): 25.5 ML
BH CV ECHO MEAS - ESV(MOD-SP2): 28.4 ML
BH CV ECHO MEAS - ESV(MOD-SP4): 23.7 ML
BH CV ECHO MEAS - FS: 31.3 %
BH CV ECHO MEAS - IVS/LVPW: 1.04 CM
BH CV ECHO MEAS - IVSD: 1.17 CM
BH CV ECHO MEAS - LA DIMENSION: 3.7 CM
BH CV ECHO MEAS - LAT PEAK E' VEL: 6.5 CM/SEC
BH CV ECHO MEAS - LV DIASTOLIC VOL/BSA (35-75): 37.2 CM2
BH CV ECHO MEAS - LV MASS(C)D: 171.2 GRAMS
BH CV ECHO MEAS - LV MAX PG: 6.7 MMHG
BH CV ECHO MEAS - LV MEAN PG: 4 MMHG
BH CV ECHO MEAS - LV SYSTOLIC VOL/BSA (12-30): 11.6 CM2
BH CV ECHO MEAS - LV V1 MAX: 129.2 CM/SEC
BH CV ECHO MEAS - LV V1 VTI: 24.9 CM
BH CV ECHO MEAS - LVIDD: 4.3 CM
BH CV ECHO MEAS - LVIDS: 2.9 CM
BH CV ECHO MEAS - LVOT AREA: 2.5 CM2
BH CV ECHO MEAS - LVOT DIAM: 1.78 CM
BH CV ECHO MEAS - LVPWD: 1.12 CM
BH CV ECHO MEAS - MED PEAK E' VEL: 5 CM/SEC
BH CV ECHO MEAS - MR MAX PG: 63.2 MMHG
BH CV ECHO MEAS - MR MAX VEL: 397.4 CM/SEC
BH CV ECHO MEAS - MV A MAX VEL: 145.7 CM/SEC
BH CV ECHO MEAS - MV DEC SLOPE: 393.6 CM/SEC2
BH CV ECHO MEAS - MV E MAX VEL: 114 CM/SEC
BH CV ECHO MEAS - MV E/A: 0.78
BH CV ECHO MEAS - MV MAX PG: 11.1 MMHG
BH CV ECHO MEAS - MV MEAN PG: 5.9 MMHG
BH CV ECHO MEAS - MV P1/2T: 86 MSEC
BH CV ECHO MEAS - MV V2 VTI: 28.3 CM
BH CV ECHO MEAS - MVA(P1/2T): 2.6 CM2
BH CV ECHO MEAS - MVA(VTI): 2.19 CM2
BH CV ECHO MEAS - PA V2 MAX: 145.2 CM/SEC
BH CV ECHO MEAS - RAP SYSTOLE: 3 MMHG
BH CV ECHO MEAS - RVDD: 2.28 CM
BH CV ECHO MEAS - RVSP: 20.9 MMHG
BH CV ECHO MEAS - SI(MOD-SP2): 25.5 ML/M2
BH CV ECHO MEAS - SI(MOD-SP4): 25.6 ML/M2
BH CV ECHO MEAS - SV(LVOT): 62.2 ML
BH CV ECHO MEAS - SV(MOD-SP2): 52.1 ML
BH CV ECHO MEAS - SV(MOD-SP4): 52.4 ML
BH CV ECHO MEAS - TAPSE (>1.6): 2.19 CM
BH CV ECHO MEAS - TR MAX PG: 17.9 MMHG
BH CV ECHO MEAS - TR MAX VEL: 211.7 CM/SEC
BH CV ECHO MEASUREMENTS AVERAGE E/E' RATIO: 19.83
BUN SERPL-MCNC: 14 MG/DL (ref 6–20)
BUN/CREAT SERPL: 19.2 (ref 7–25)
CALCIUM SPEC-SCNC: 9.5 MG/DL (ref 8.6–10.5)
CHLORIDE SERPL-SCNC: 99 MMOL/L (ref 98–107)
CO2 SERPL-SCNC: 31 MMOL/L (ref 22–29)
CREAT SERPL-MCNC: 0.73 MG/DL (ref 0.57–1)
DEPRECATED RDW RBC AUTO: 40.6 FL (ref 37–54)
EGFRCR SERPLBLD CKD-EPI 2021: 96.1 ML/MIN/1.73
EOSINOPHIL # BLD AUTO: 0.14 10*3/MM3 (ref 0–0.4)
EOSINOPHIL NFR BLD AUTO: 1.6 % (ref 0.3–6.2)
ERYTHROCYTE [DISTWIDTH] IN BLOOD BY AUTOMATED COUNT: 13.9 % (ref 12.3–15.4)
GLUCOSE SERPL-MCNC: 109 MG/DL (ref 65–99)
HCT VFR BLD AUTO: 40.7 % (ref 34–46.6)
HGB BLD-MCNC: 13.1 G/DL (ref 12–15.9)
IMM GRANULOCYTES # BLD AUTO: 0.09 10*3/MM3 (ref 0–0.05)
IMM GRANULOCYTES NFR BLD AUTO: 1 % (ref 0–0.5)
LEFT ATRIUM VOLUME INDEX: 26 ML/M2
LYMPHOCYTES # BLD AUTO: 2.63 10*3/MM3 (ref 0.7–3.1)
LYMPHOCYTES NFR BLD AUTO: 29.2 % (ref 19.6–45.3)
MAGNESIUM SERPL-MCNC: 1.8 MG/DL (ref 1.6–2.6)
MAXIMAL PREDICTED HEART RATE: 163 BPM
MCH RBC QN AUTO: 26.5 PG (ref 26.6–33)
MCHC RBC AUTO-ENTMCNC: 32.2 G/DL (ref 31.5–35.7)
MCV RBC AUTO: 82.2 FL (ref 79–97)
MONOCYTES # BLD AUTO: 0.71 10*3/MM3 (ref 0.1–0.9)
MONOCYTES NFR BLD AUTO: 7.9 % (ref 5–12)
NEUTROPHILS NFR BLD AUTO: 5.39 10*3/MM3 (ref 1.7–7)
NEUTROPHILS NFR BLD AUTO: 59.6 % (ref 42.7–76)
NRBC BLD AUTO-RTO: 0 /100 WBC (ref 0–0.2)
PLATELET # BLD AUTO: 162 10*3/MM3 (ref 140–450)
PMV BLD AUTO: 10.7 FL (ref 6–12)
POTASSIUM SERPL-SCNC: 3.8 MMOL/L (ref 3.5–5.2)
RBC # BLD AUTO: 4.95 10*6/MM3 (ref 3.77–5.28)
SODIUM SERPL-SCNC: 141 MMOL/L (ref 136–145)
STRESS TARGET HR: 139 BPM
WBC NRBC COR # BLD: 9.02 10*3/MM3 (ref 3.4–10.8)

## 2022-09-09 PROCEDURE — G0378 HOSPITAL OBSERVATION PER HR: HCPCS

## 2022-09-09 PROCEDURE — 97530 THERAPEUTIC ACTIVITIES: CPT

## 2022-09-09 PROCEDURE — 85025 COMPLETE CBC W/AUTO DIFF WBC: CPT | Performed by: STUDENT IN AN ORGANIZED HEALTH CARE EDUCATION/TRAINING PROGRAM

## 2022-09-09 PROCEDURE — 83735 ASSAY OF MAGNESIUM: CPT | Performed by: STUDENT IN AN ORGANIZED HEALTH CARE EDUCATION/TRAINING PROGRAM

## 2022-09-09 PROCEDURE — 97110 THERAPEUTIC EXERCISES: CPT

## 2022-09-09 PROCEDURE — 25010000002 FUROSEMIDE PER 20 MG: Performed by: STUDENT IN AN ORGANIZED HEALTH CARE EDUCATION/TRAINING PROGRAM

## 2022-09-09 PROCEDURE — 25010000002 ENOXAPARIN PER 10 MG: Performed by: INTERNAL MEDICINE

## 2022-09-09 PROCEDURE — 80048 BASIC METABOLIC PNL TOTAL CA: CPT | Performed by: STUDENT IN AN ORGANIZED HEALTH CARE EDUCATION/TRAINING PROGRAM

## 2022-09-09 RX ADMIN — HYDROCODONE BITARTRATE AND ACETAMINOPHEN 1 TABLET: 7.5; 325 TABLET ORAL at 10:13

## 2022-09-09 RX ADMIN — HYDROCODONE BITARTRATE AND ACETAMINOPHEN 1 TABLET: 7.5; 325 TABLET ORAL at 05:50

## 2022-09-09 RX ADMIN — HYDROCODONE BITARTRATE AND ACETAMINOPHEN 1 TABLET: 7.5; 325 TABLET ORAL at 14:07

## 2022-09-09 RX ADMIN — ROSUVASTATIN CALCIUM 5 MG: 5 TABLET, FILM COATED ORAL at 09:05

## 2022-09-09 RX ADMIN — ENOXAPARIN SODIUM 40 MG: 40 INJECTION SUBCUTANEOUS at 09:04

## 2022-09-09 RX ADMIN — Medication 10 ML: at 20:30

## 2022-09-09 RX ADMIN — HYDROCODONE BITARTRATE AND ACETAMINOPHEN 1 TABLET: 7.5; 325 TABLET ORAL at 18:04

## 2022-09-09 RX ADMIN — HYDROCODONE BITARTRATE AND ACETAMINOPHEN 1 TABLET: 7.5; 325 TABLET ORAL at 21:59

## 2022-09-09 RX ADMIN — GABAPENTIN 600 MG: 300 CAPSULE ORAL at 20:30

## 2022-09-09 RX ADMIN — FUROSEMIDE 40 MG: 10 INJECTION, SOLUTION INTRAVENOUS at 18:04

## 2022-09-09 RX ADMIN — FUROSEMIDE 40 MG: 10 INJECTION, SOLUTION INTRAVENOUS at 05:51

## 2022-09-09 RX ADMIN — CETIRIZINE HYDROCHLORIDE 10 MG: 10 TABLET, FILM COATED ORAL at 09:05

## 2022-09-09 RX ADMIN — Medication 10 ML: at 09:05

## 2022-09-09 NOTE — PROGRESS NOTES
[facility] Medicine   INPATIENT PROGRESS NOTE      Patient Name: Haven Mahoney  Date of Admission: 9/7/2022  Today's Date: 09/09/22  Length of Stay: 0  Primary Care Physician: Asif Lin MD    Subjective   Chief Complaint: Shortness of breath, swelling    HPI   56-year-old female with hypertension, hyperlipidemia, admitted with concern for decompensated heart failure after presenting with worsening dyspnea on exertion, increased bilateral lower extremity and abdominal swelling, orthopnea, with evidence of pulmonary edema on chest x-ray.  proBNP not elevated however this is not a sensitive test in setting of morbid obesity.  Admitted and treated with IV Lasix.  Echocardiogram showed EF 51-55% with grade 1 diastolic dysfunction. Her shortness of breath is a little better but worse with exertion.         Review of Systems   Respiratory: Positive for shortness of breath.       14 point review of systems completed  All pertinent negatives and positives are as above. All other systems have been reviewed and are negative unless otherwise stated.     Objective    Temp:  [97.3 °F (36.3 °C)-98.1 °F (36.7 °C)] 97.3 °F (36.3 °C)  Heart Rate:  [69-91] 69  Resp:  [16-20] 16  BP: ()/(53-86) 99/53  Physical Exam  General: Nontoxic, no acute distress  Psych: Calm and cooperative  Neck: No JVD appreciated  HEENT: NC/AT, normal sclera  Cardiovascular: Normal rate, regular rhythm, pulses equal bilaterally  Respiratory: Diminished breath sounds bilaterally, no wheezes  Abdomen: Obese, mild distention, nontender, bowel sounds present  Neurologic: Nonfocal, follows commands, normal speech  Extremities: Bilateral extremity pitting edema, no clubbing or cyanosis  Skin: Warm and dry, well perfused        Results Review:  I have reviewed the labs, radiology results, and diagnostic studies.    Laboratory Data:   Results from last 7 days   Lab Units 09/09/22  0555 09/08/22  042  09/07/22  1544   WBC 10*3/mm3 9.02 8.39 7.75   HEMOGLOBIN g/dL 13.1 12.1 13.7   HEMATOCRIT % 40.7 36.0 38.9   PLATELETS 10*3/mm3 162 153 174        Results from last 7 days   Lab Units 09/09/22  0555 09/08/22  1615 09/08/22  0426 09/07/22  1544   SODIUM mmol/L 141  --  142 141   POTASSIUM mmol/L 3.8 4.3 3.4* 3.2*   CHLORIDE mmol/L 99  --  101 103   CO2 mmol/L 31.0*  --  31.0* 29.0   BUN mg/dL 14  --  9 7   CREATININE mg/dL 0.73  --  0.62 0.63   CALCIUM mg/dL 9.5  --  9.2 9.5   BILIRUBIN mg/dL  --   --  0.4 0.6   ALK PHOS U/L  --   --  98 111   ALT (SGPT) U/L  --   --  9 12   AST (SGOT) U/L  --   --  16 19   GLUCOSE mg/dL 109*  --  118* 97       Culture Data:   Blood Culture   Date Value Ref Range Status   09/07/2022 No growth at 24 hours  Preliminary     No results found for: URINECX  No results found for: RESPCX  No results found for: WOUNDCX  No results found for: STOOLCX  No components found for: BODYFLD    Radiology Data:   Imaging Results (Last 24 Hours)     ** No results found for the last 24 hours. **          I have reviewed the patient's current medications.     Assessment/Plan     Active Hospital Problems    Diagnosis    • **Acute decompensated heart failure (HCC)    • Essential hypertension    • Mixed hyperlipidemia        Acute decompensated heart failure, volume overload  - Monitor telemetry  - Follow-up echocardiogram  --Further medication recommendations following results of echo  - Continue IV Lasix 40 mg every 12 hours for now, good response thus far, I's and O's, goal net negative fluid balance    Lactic acidosis likely secondary to hypoperfusion from heart failure  - Lactate trended with improvement following diuresis    Hypokalemia  - Monitor and replete electrolytes    Hypertension  - Monitor BP, continue valsartan, as needed antihypertensives if warranted, further adjustment to regimen pending clinical course  -BP low/normal today    Hyperlipidemia  - Statin    DVT prophylaxis Lovenox    Dispo:  one more day of IV lasix, f/u PT/OT    Electronically signed by Clover Kendall MD, 09/09/22, 10:00 CDT.

## 2022-09-09 NOTE — PLAN OF CARE
Goal Outcome Evaluation:  Plan of Care Reviewed With: patient        Progress: improving  Outcome Evaluation: Pt. with good effort with mobility this a.m. Pt. mod independent with bed mobility, pt. transferred SBA sit-stand-stand no AD, pt. amb. 60ftx2 w/o AD SBA no LOB pt. with SOA, but O2 SATS stable during treatment, pt. performed x15-20 reps seated therex. Cont. to mobilize

## 2022-09-09 NOTE — THERAPY TREATMENT NOTE
Acute Care - Physical Therapy Treatment Note  [facility]     Patient Name: Haven Mahoney  : 1965  MRN: [id number]  Today's Date: 2022      Visit Dx:     ICD-10-CM ICD-9-CM   1. Congestive heart failure, unspecified HF chronicity, unspecified heart failure type (HCC)  I50.9 428.0   2. Dyspnea on exertion  R06.00 786.09   3. Impaired functional mobility and activity tolerance  Z74.09 V49.89   4. Nonrheumatic aortic valve stenosis  I35.0 424.1   5. Benign hypertension  I10 401.1   6. Heart failure, unspecified HF chronicity, unspecified heart failure type (HCC)  I50.9 428.9     Patient Active Problem List   Diagnosis   • Lumbosacral spondylosis without myelopathy   • Chronic pain of right knee   • High risk medications (not anticoagulants) long-term use   • Essential hypertension   • Mixed hyperlipidemia   • Heart murmur   • Cellulitis and abscess of foot   • Pyogenic inflammation of bone (HCC)   • Osteomyelitis of great toe of left foot (HCC)   • COVID-19   • Acute decompensated heart failure (HCC)     Past Medical History:   Diagnosis Date   • Anxiety    • Arthritis    • Arthropathy of lumbar facet joint    • Callus    • Chronic depression    • Degeneration of lumbar intervertebral disc    • Drug therapy     Other long term (current) drug therapy      • Epilepsy (HCC)    • Foot pain, left    • Headache    • Hearing loss    • Heart problem    • High blood pressure    • Hypercholesterolemia    • Hypertensive disorder    • Obesity    • Osteoporosis    • Osteoporosis    • Pancreatitis    • Seizures ([facility])    • Varicose veins of lower extremity    • Wears glasses      Past Surgical History:   Procedure Laterality Date   • APPENDECTOMY     • CARDIAC CATHETERIZATION  2013    Cardiac cath 38084 (1)      • CHOLECYSTECTOMY     • INCISION AND DRAINAGE LEG Left 2021    Procedure: Left foot incision and drainage, tibial sesamoidectomy            (MIN C-ARM);  Surgeon: Gamaliel Starks DPM;   Location: [facility];  Service: Podiatry;  Laterality: Left;   • INCISION AND DRAINAGE LEG Left 3/4/2021    Procedure: Left foot incision and drainage, bone biopsy;  Surgeon: Gamaliel Starks DPM;  Location: [facility];  Service: Podiatry;  Laterality: Left;   • INJECTION OF MEDICATION  03/28/2016    Injection for nerve block (2)      • OTHER SURGICAL HISTORY  05/05/2016    Incise spinal column/nerves (1)        PT Assessment (last 12 hours)     PT Evaluation and Treatment     Row Name 09/09/22 0945          Physical Therapy Time and Intention    Subjective Information no complaints  -     Document Type therapy note (daily note)  -     Mode of Treatment individual therapy;physical therapy  -     Patient Effort excellent  -     Symptoms Noted During/After Treatment shortness of breath  -[facility] Name 09/09/22 0945          General Information    Patient Profile Reviewed yes  -     Existing Precautions/Restrictions fall  -[facility] Name 09/09/22 0945          Pain    Pretreatment Pain Rating 0/10 - no pain  -     Posttreatment Pain Rating 0/10 - no pain  -[facility] Name 09/09/22 0945          Cognition    Orientation Status (Cognition) oriented x 4  -[facility] Name 09/09/22 0945          Range of Motion Comprehensive    General Range of Motion bilateral lower extremity ROM WFL  -[facility] Name 09/09/22 0945          Strength Comprehensive (MMT)    General Manual Muscle Testing (MMT) Assessment no strength deficits identified  -[facility] Name 09/09/22 0945          Bed Mobility    Bed Mobility supine-sit;sit-supine  -     Supine-Sit [place] (Bed Mobility) modified independence  -     Sit-Supine [place] (Bed Mobility) modified independence  -[facility] Name 09/09/22 0945          Transfers    Transfers sit-stand transfer;stand-sit transfer  -     Sit-Stand [place] (Transfers) independent  -     Stand-Sit [place] (Transfers) independent  -[facility] Name 09/09/22 0945           Sit-Stand Transfer    Assistive Device (Sit-Stand Transfers) other (see comments)  no AD  -     Row Name 09/09/22 0945          Stand-Sit Transfer    Assistive Device (Stand-Sit Transfers) other (see comments)  no AD  -     Row Name 09/09/22 0945          Gait/Stairs (Locomotion)    [place] Level (Gait) standby assist  -     Assistive Device (Gait) other (see comments)  no AD  -     Distance in Feet (Gait) 60x2  -     Deviations/Abnormal Patterns (Gait) stride length decreased  -     Comment, (Gait/Stairs) Pt. with SOA after performance of each gt. attempt, but O2 SATS 95% on RA  -[facility] Name 09/09/22 0945          Safety Issues, Functional Mobility    Impairments Affecting Function (Mobility) balance;coordination;shortness of breath;endurance/activity tolerance  -[facility] Name 09/09/22 0945          Motor Skills    Therapeutic Exercise hip;knee;ankle  -[facility] Name 09/09/22 0945          Hip (Therapeutic Exercise)    Hip (Therapeutic Exercise) AROM (active range of motion);isometric exercises  -     Hip AROM (Therapeutic Exercise) bilateral;flexion  -     Hip Isometrics (Therapeutic Exercise) aDduction  -[facility] Name 09/09/22 0945          Knee (Therapeutic Exercise)    Knee (Therapeutic Exercise) AROM (active range of motion)  -     Knee AROM (Therapeutic Exercise) bilateral;LAQ (long arc quad)  -[facility] Name 09/09/22 0945          Ankle (Therapeutic Exercise)    Ankle (Therapeutic Exercise) AROM (active range of motion)  -     Ankle AROM (Therapeutic Exercise) bilateral;dorsiflexion;plantarflexion  -[facility] Name 09/09/22 0945          Vital Signs    Pre Systolic BP Rehab 121  -     Pre Treatment Diastolic BP 73  -     Pretreatment Heart Rate (beats/min) 88  -     Posttreatment Heart Rate (beats/min) 93  -     Intra SpO2 (%) 94  -JA     O2 Delivery Intra Treatment room air  -     Post SpO2 (%) 97  -JA     O2 Delivery Post Treatment room air  -     Pre  Patient Position Supine  -     Intra Patient Position Standing  -JA     Post Patient Position Supine  -[facility] Name 09/09/22 0945          Positioning and Restraints    Pre-Treatment Position in bed  -JA     Post Treatment Position bed  -JA     In Bed supine;call light within reach;encouraged to call for assist  -[facility] Name 09/09/22 0945          Therapy Assessment/Plan (PT)    Rehab Potential (PT) good, to achieve stated therapy goals  -     Criteria for Skilled Interventions Met (PT) yes;meets criteria;skilled treatment is necessary  -     Therapy Frequency (PT) other (see comments)  3-7d/week  -JA     Row Name 09/09/22 0945          Progress Summary (PT)    Progress Toward Functional Goals (PT) progress toward functional goals as expected  -JA     Row Name 09/09/22 0945          Bed Mobility Goal 1 (PT)    Activity/Assistive Device (Bed Mobility Goal 1, PT) sit to supine;supine to sit  -JA     [place] Level/Cues Needed (Bed Mobility Goal 1, PT) independent  -JA     Time Frame (Bed Mobility Goal 1, PT) by discharge  -JA     Progress/Outcomes (Bed Mobility Goal 1, PT) goal not met  -JA     Row Name 09/09/22 0945          Transfer Goal 1 (PT)    Activity/Assistive Device (Transfer Goal 1, PT) sit-to-stand/stand-to-sit  -JA     [place] Level/Cues Needed (Transfer Goal 1, PT) independent  -JA     Time Frame (Transfer Goal 1, PT) by discharge  -JA     Progress/Outcome (Transfer Goal 1, PT) goal met   -JA     Row Name 09/09/22 0945          Gait Training Goal 1 (PT)    Activity/Assistive Device (Gait Training Goal 1, PT) gait (walking locomotion)  -JA     [place] Level (Gait Training Goal 1, PT) independent  -JA     Distance (Gait Training Goal 1, PT) 100'x1  -JA     Time Frame (Gait Training Goal 1, PT) by discharge  -JA     Progress/Outcome (Gait Training Goal 1, PT) goal not met  -JA     Row Name 09/09/22 0945          Stairs Goal 1 (PT)    Activity/Assistive Device (Stairs Goal 1, PT)  stairs, all skills;ascending stairs;descending stairs  -     [place] Level/Cues Needed (Stairs Goal 1, PT) independent  -JA     Number of Stairs (Stairs Goal 1, PT) 2  -JA     Time Frame (Stairs Goal 1, PT) by discharge  -JA     Progress/Outcome (Stairs Goal 1, PT) goal not met  -           User Key  (r) = Recorded By, (t) = Taken By, (c) = Cosigned By    Initials Name Provider Type    Govind Van, PTA Physical Therapist Assistant                Physical Therapy Education                 Title: PT OT SLP Therapies (Done)     Topic: Physical Therapy (Done)     Point: Mobility training (Done)     Learning Progress Summary           Patient Acceptance, E,TB, VU,NR by LR at 9/8/2022 1415    Comment: Educated on PT POC and goals.                   Point: Home exercise program (Done)     Learning Progress Summary           Patient Acceptance, E,TB, VU,NR by LR at 9/8/2022 1415    Comment: Educated on PT POC and goals.                   Point: Body mechanics (Done)     Learning Progress Summary           Patient Acceptance, E,TB, VU,NR by LR at 9/8/2022 1415    Comment: Educated on PT POC and goals.                   Point: Precautions (Done)     Learning Progress Summary           Patient Acceptance, E,TB, VU,NR by LR at 9/8/2022 1415    Comment: Educated on PT POC and goals.                               User Key     Initials Effective Dates Name Provider Type [facility] 06/16/21 -  Bay Bhakta Physical Therapist PT              PT Recommendation and Plan  Anticipated Discharge Disposition (PT): home, home with assist  Therapy Frequency (PT): other (see comments) (3-7d/week)  Progress Summary (PT)  Progress Toward Functional Goals (PT): progress toward functional goals as expected  Plan of Care Reviewed With: patient  Progress: improving  Outcome Evaluation: Pt. with good effort with mobility this a.m. Pt. mod independent with bed mobility, pt. transferred SBA sit-stand-stand no AD, pt. amb.  60ftx2 w/o AD SBA no LOB pt. with SOA, but O2 SATS stable during treatment, pt. performed x15-20 reps seated therex. Cont. to mobilize       Time Calculation:    PT Charges     Row Name 09/09/22 1239             Time Calculation    Start Time 0945  -      Stop Time 1010  -JA      Time Calculation (min) 25 min  -JA              Time Calculation- PT    Total Timed Code Minutes- PT 25 minute(s)  -JA              Timed Charges    69377 - PT Therapeutic Exercise Minutes 15  -JA      64947 - PT Therapeutic Activity Minutes 10  -JA              Total Minutes    Timed Charges Total Minutes 25  -JA       Total Minutes 25  -JA            User Key  (r) = Recorded By, (t) = Taken By, (c) = Cosigned By    Initials Name Provider Type    Govind Van PTA Physical Therapist Assistant              Therapy Charges for Today     Code Description Service Date Service Provider Modifiers Qty    96241970906 HC PT THER PROC EA 15 MIN 9/9/2022 Govind Crawford PTA GP 1    09152324484 HC PT THERAPEUTIC ACT EA 15 MIN 9/9/2022 Govind Crawford PTA GP 1          PT G-Codes  Outcome Measure Options: AM-PAC 6 Clicks Basic Mobility (PT), Tinetti  AM-PAC 6 Clicks Score (PT): 23  [place] Total Score: 27    Govind Crawford PTA  9/9/2022

## 2022-09-10 PROBLEM — I50.9 CONGESTIVE HEART FAILURE, UNSPECIFIED HF CHRONICITY, UNSPECIFIED HEART FAILURE TYPE: Status: ACTIVE | Noted: 2022-09-10

## 2022-09-10 LAB
ANION GAP SERPL CALCULATED.3IONS-SCNC: 10 MMOL/L (ref 5–15)
BUN SERPL-MCNC: 20 MG/DL (ref 6–20)
BUN/CREAT SERPL: 24.1 (ref 7–25)
CALCIUM SPEC-SCNC: 9.4 MG/DL (ref 8.6–10.5)
CHLORIDE SERPL-SCNC: 96 MMOL/L (ref 98–107)
CO2 SERPL-SCNC: 31 MMOL/L (ref 22–29)
CREAT SERPL-MCNC: 0.83 MG/DL (ref 0.57–1)
EGFRCR SERPLBLD CKD-EPI 2021: 82.3 ML/MIN/1.73
GLUCOSE SERPL-MCNC: 102 MG/DL (ref 65–99)
POTASSIUM SERPL-SCNC: 3.5 MMOL/L (ref 3.5–5.2)
SODIUM SERPL-SCNC: 137 MMOL/L (ref 136–145)
WHOLE BLOOD HOLD SPECIMEN: NORMAL

## 2022-09-10 PROCEDURE — 25010000002 FUROSEMIDE PER 20 MG: Performed by: STUDENT IN AN ORGANIZED HEALTH CARE EDUCATION/TRAINING PROGRAM

## 2022-09-10 PROCEDURE — 25010000002 ENOXAPARIN PER 10 MG: Performed by: INTERNAL MEDICINE

## 2022-09-10 PROCEDURE — 97116 GAIT TRAINING THERAPY: CPT

## 2022-09-10 PROCEDURE — 80048 BASIC METABOLIC PNL TOTAL CA: CPT | Performed by: STUDENT IN AN ORGANIZED HEALTH CARE EDUCATION/TRAINING PROGRAM

## 2022-09-10 RX ORDER — FUROSEMIDE 40 MG/1
40 TABLET ORAL DAILY
Status: DISCONTINUED | OUTPATIENT
Start: 2022-09-10 | End: 2022-09-11 | Stop reason: HOSPADM

## 2022-09-10 RX ADMIN — CETIRIZINE HYDROCHLORIDE 10 MG: 10 TABLET, FILM COATED ORAL at 08:30

## 2022-09-10 RX ADMIN — HYDROCODONE BITARTRATE AND ACETAMINOPHEN 1 TABLET: 7.5; 325 TABLET ORAL at 12:32

## 2022-09-10 RX ADMIN — FUROSEMIDE 40 MG: 10 INJECTION, SOLUTION INTRAVENOUS at 08:29

## 2022-09-10 RX ADMIN — ENOXAPARIN SODIUM 40 MG: 40 INJECTION SUBCUTANEOUS at 08:29

## 2022-09-10 RX ADMIN — HYDROCODONE BITARTRATE AND ACETAMINOPHEN 1 TABLET: 7.5; 325 TABLET ORAL at 03:00

## 2022-09-10 RX ADMIN — HYDROCODONE BITARTRATE AND ACETAMINOPHEN 1 TABLET: 7.5; 325 TABLET ORAL at 17:05

## 2022-09-10 RX ADMIN — GABAPENTIN 600 MG: 300 CAPSULE ORAL at 20:10

## 2022-09-10 RX ADMIN — Medication 10 ML: at 20:10

## 2022-09-10 RX ADMIN — Medication 10 ML: at 08:31

## 2022-09-10 RX ADMIN — HYDROCODONE BITARTRATE AND ACETAMINOPHEN 1 TABLET: 7.5; 325 TABLET ORAL at 20:52

## 2022-09-10 RX ADMIN — VALSARTAN 320 MG: 160 TABLET, FILM COATED ORAL at 08:29

## 2022-09-10 RX ADMIN — HYDROCODONE BITARTRATE AND ACETAMINOPHEN 1 TABLET: 7.5; 325 TABLET ORAL at 08:30

## 2022-09-10 NOTE — PROGRESS NOTES
[facility] Medicine   INPATIENT PROGRESS NOTE    NAME: Haven Mahoney  : 1965  MRN: [id number]      LOS: 0 days     PROVIDER OF SERVICE: Marine Dobson MD    Chief Complaint: Acute decompensated heart failure (HCC)    Subjective:     Interval History:  History taken from: patient    56 yr old female with history of HTN, Hyperlipidemia, was admitted due to concern for decompensated heart failure in the setting of sob on exertion, increased b/l lower extremity edema and abdominal swelling, orthopnea. CXR showed pulmonary edema. Patient was admitted and treated with IV Lasix and has been diuresing well. Echo was done on 22 showed LVEF 51-55% with grade 1 diastolic dysfunction. Since starting on IV lasix, patient's symptoms improved significantly. No overnight events and patient reports improvement in her sob. No new complaints today.     Review of Systems:   Review of Systems   Constitutional: Negative for activity change, appetite change, diaphoresis, fatigue and fever.   Respiratory: Negative for cough, chest tightness, shortness of breath and wheezing.    Cardiovascular: Negative for chest pain, palpitations and leg swelling.   Gastrointestinal: Negative for abdominal pain, diarrhea, nausea and vomiting.   Genitourinary: Negative for difficulty urinating, flank pain, frequency and urgency.   Musculoskeletal: Negative for arthralgias and myalgias.   Skin: Negative for color change and rash.   Neurological: Negative for dizziness, weakness, light-headedness and headaches.   Psychiatric/Behavioral: Negative for sleep disturbance. The patient is not nervous/anxious.        Objective:     Vital Signs  Temp:  [97.3 °F (36.3 °C)-97.8 °F (36.6 °C)] 97.8 °F (36.6 °C)  Heart Rate:  [73-85] 75  Resp:  [16] 16  BP: ()/(56-67) 105/59   Body mass index is 47.77 kg/m².    Physical Exam  Physical Exam  Vitals and nursing note reviewed.   Constitutional:        General: She is not in acute distress.     Appearance: Normal appearance. She is obese. She is not ill-appearing, toxic-appearing or diaphoretic.   Cardiovascular:      Rate and Rhythm: Normal rate and regular rhythm.      Pulses: Normal pulses.      Heart sounds: Murmur heard.   Pulmonary:      Effort: Pulmonary effort is normal. No respiratory distress.      Breath sounds: Normal breath sounds. No wheezing.   Abdominal:      General: Bowel sounds are normal. There is no distension.      Palpations: Abdomen is soft.      Tenderness: There is no abdominal tenderness.   Musculoskeletal:         General: Normal range of motion.      Right lower leg: No edema.      Left lower leg: No edema.   Skin:     General: Skin is warm and dry.      Capillary Refill: Capillary refill takes less than 2 seconds.   Neurological:      General: No focal deficit present.      Mental Status: She is alert and oriented to person, place, and time. Mental status is at baseline.   Psychiatric:         Mood and Affect: Mood normal.         Behavior: Behavior normal.         Scheduled Meds   cetirizine, 10 mg, Oral, Daily  enoxaparin, 40 mg, Subcutaneous, Daily  furosemide, 40 mg, Intravenous, Q12H  gabapentin, 600 mg, Oral, Nightly  rosuvastatin, 5 mg, Oral, Once per day on Mon Wed Fri  sodium chloride, 10 mL, Intravenous, Q12H  valsartan, 320 mg, Oral, Daily       PRN Meds   fluticasone  •  hydrALAZINE  •  HYDROcodone-acetaminophen  •  labetalol  •  ondansetron  •  potassium chloride **OR** potassium chloride **OR** potassium chloride  •  [COMPLETED] Insert peripheral IV **AND** sodium chloride  •  sodium chloride      Diagnostic Data    Results from last 7 days   Lab Units 09/10/22  0534 09/09/22  0555 09/08/22  1615 09/08/22  0426   WBC 10*3/mm3  --  9.02  --  8.39   HEMOGLOBIN g/dL  --  13.1  --  12.1   HEMATOCRIT %  --  40.7  --  36.0   PLATELETS 10*3/mm3  --  162  --  153   GLUCOSE mg/dL 102* 109*  --  118*   CREATININE mg/dL 0.83 0.73   --  0.62   BUN mg/dL 20 14  --  9   SODIUM mmol/L 137 141  --  142   POTASSIUM mmol/L 3.5 3.8   < > 3.4*   AST (SGOT) U/L  --   --   --  16   ALT (SGPT) U/L  --   --   --  9   ALK PHOS U/L  --   --   --  98   BILIRUBIN mg/dL  --   --   --  0.4   ANION GAP mmol/L 10.0 11.0  --  10.0    < > = values in this interval not displayed.       No radiology results for the last day      I reviewed the patient's new clinical results.  I reviewed the patient's new imaging results and agree with the interpretation.  I reviewed the patient's other test results and agree with the interpretation    Assessment/Plan:     @Active and Resolved Problems  Active Hospital Problems    Diagnosis  POA   • **Acute decompensated heart failure (HCC) [I50.9]  Yes   • Essential hypertension [I10]  Yes   • Mixed hyperlipidemia [E78.2]  Yes      Resolved Hospital Problems   No resolved problems to display.       # Acute decompensated heart failure, volume overload  - Significant improvement in symptoms since starting in IV Lasix   - Echo 9/7/22 showed LVEF 51-55%, grade 1 impaired relaxation.   - Discontinued IV Lasix 40 mg every 12 hours to oral Lasix 40 mg daily today. Good urine output   - [place] Is and Os  - Discharge tomorrow if diuresing well with oral Lasix   - Cardiac monitor     #Lactic acidosis likely secondary to hypoperfusion from heart failure  - Lactate trended with improvement following diuresis     #Hypokalemia  - Improved with electrolyte replacement      #Hypertension  - Monitor BP, continue valsartan, as needed antihypertensives if warranted, further adjustment to regimen pending clinical course  -BP low/normal over the last 2 days      #Hyperlipidemia  - Statin    DVT prophylaxis: Lovenox  Code status is   Code Status and Medical Interventions:   Ordered at: 09/07/22 2047     Code Status (Patient has no pulse and is not breathing):    CPR (Attempt to Resuscitate)     Medical Interventions (Patient has pulse or is breathing):     Full Support       Plan for disposition:Where: current living arrangements and When:  tomorrow      Time: More than 50% of time spent in counseling and coordination of care:  Total face-to-face/floor time 15  min.  Time spent in counseling 15 min. Counseling included the following topics: good oral intake          Marine Dobson MD PGY-3  [facility]   This document has been electronically signed by Marine Dobson MD on September 10, 2022 10:39 CDT

## 2022-09-10 NOTE — PLAN OF CARE
Goal Outcome Evaluation:              Outcome Evaluation: Pain medication administered X2 overnight; no additional complaints noted; monitoring VS

## 2022-09-10 NOTE — PLAN OF CARE
Goal Outcome Evaluation:  Plan of Care Reviewed With: patient           Outcome Evaluation: t/fers sup<>sit w/ Mod Ind, sit<>stand Ind, ambulates ~100' w/ no AD w/ SBA, pt sats 93-98% during tx, performed B LE seated ther ex x 10 reps

## 2022-09-10 NOTE — THERAPY TREATMENT NOTE
Acute Care - Physical Therapy Treatment Note  [facility]     Patient Name: Haven Mahoney  : 1965  MRN: [id number]  Today's Date: 9/10/2022      Visit Dx:     ICD-10-CM ICD-9-CM   1. Congestive heart failure, unspecified HF chronicity, unspecified heart failure type (HCC)  I50.9 428.0   2. Dyspnea on exertion  R06.00 786.09   3. Impaired functional mobility and activity tolerance  Z74.09 V49.89   4. Nonrheumatic aortic valve stenosis  I35.0 424.1   5. Benign hypertension  I10 401.1   6. Heart failure, unspecified HF chronicity, unspecified heart failure type (HCC)  I50.9 428.9     Patient Active Problem List   Diagnosis   • Lumbosacral spondylosis without myelopathy   • Chronic pain of right knee   • High risk medications (not anticoagulants) long-term use   • Essential hypertension   • Mixed hyperlipidemia   • Heart murmur   • Cellulitis and abscess of foot   • Pyogenic inflammation of bone (HCC)   • Osteomyelitis of great toe of left foot (HCC)   • COVID-19   • Acute decompensated heart failure (HCC)     Past Medical History:   Diagnosis Date   • Anxiety    • Arthritis    • Arthropathy of lumbar facet joint    • Callus    • Chronic depression    • Degeneration of lumbar intervertebral disc    • Drug therapy     Other long term (current) drug therapy      • Epilepsy (HCC)    • Foot pain, left    • Headache    • Hearing loss    • Heart problem    • High blood pressure    • Hypercholesterolemia    • Hypertensive disorder    • Obesity    • Osteoporosis    • Osteoporosis    • Pancreatitis    • Seizures ([facility])    • Varicose veins of lower extremity    • Wears glasses      Past Surgical History:   Procedure Laterality Date   • APPENDECTOMY     • CARDIAC CATHETERIZATION  2013    Cardiac cath 09327 (1)      • CHOLECYSTECTOMY     • INCISION AND DRAINAGE LEG Left 2021    Procedure: Left foot incision and drainage, tibial sesamoidectomy            (MIN C-ARM);  Surgeon: Gamaliel Starks DPM;   Location: [facility] OR;  Service: Podiatry;  Laterality: Left;   • INCISION AND DRAINAGE LEG Left 3/4/2021    Procedure: Left foot incision and drainage, bone biopsy;  Surgeon: Gamaliel Starks DPM;  Location: [facility] OR;  Service: Podiatry;  Laterality: Left;   • INJECTION OF MEDICATION  03/28/2016    Injection for nerve block (2)      • OTHER SURGICAL HISTORY  05/05/2016    Incise spinal column/nerves (1)        PT Assessment (last 12 hours)     PT Evaluation and Treatment     Row Name 09/10/22 1308          Physical Therapy Time and Intention    Subjective Information complains of;pain  -     Document Type therapy note (daily note)  -     Mode of Treatment individual therapy;physical therapy  -     Patient Effort excellent  -     Row Name 09/10/22 1308          General Information    Patient Profile Reviewed yes  -     Existing Precautions/Restrictions fall  -     Row Name 09/10/22 1308          Pain    Pre/Posttreatment Pain Comment pt c/o's pain in B hips/knees from arthritis and R shoulder from torn RTC, pt does not give rating  -     Pain Intervention(s) Medication (See MAR)  -     Row Name 09/10/22 1308          Cognition    Orientation Status (Cognition) oriented x 4  -     Row Name 09/10/22 1308          Bed Mobility    Bed Mobility supine-sit;sit-supine  -     Supine-Sit [place] (Bed Mobility) modified independence  -     Sit-Supine [place] (Bed Mobility) modified independence  -     Row Name 09/10/22 1308          Transfers    Transfers sit-stand transfer;stand-sit transfer  -     Sit-Stand [place] (Transfers) independent  -     Stand-Sit [place] (Transfers) independent  -     Row Name 09/10/22 1308          Sit-Stand Transfer    Assistive Device (Sit-Stand Transfers) other (see comments)  no AD  -     Row Name 09/10/22 1308          Stand-Sit Transfer    Assistive Device (Stand-Sit Transfers) other (see comments)  no AD  -     Row Name 09/10/22 1308           Gait/Stairs (Locomotion)    [place] Level (Gait) standby assist  -     Assistive Device (Gait) other (see comments)  no AD  -JW     Distance in Feet (Gait) 100'  -JW     Deviations/Abnormal Patterns (Gait) stride length decreased  -[facility] Name 09/10/22 1308          Safety Issues, Functional Mobility    Impairments Affecting Function (Mobility) balance;coordination;shortness of breath;endurance/activity tolerance  -JW     Row Name 09/10/22 1308          Hip (Therapeutic Exercise)    Hip AROM (Therapeutic Exercise) bilateral;extension;sitting  -JW     Row Name 09/10/22 1308          Knee (Therapeutic Exercise)    Knee AROM (Therapeutic Exercise) bilateral;LAQ (long arc quad);sitting  -[facility] 09/10/22 1308          Ankle (Therapeutic Exercise)    Ankle AROM (Therapeutic Exercise) bilateral;dorsiflexion;plantarflexion;sitting  -[facility] 09/10/22 1308          Plan of Care Review    Plan of Care Reviewed With patient  -     Outcome Evaluation t/fers sup<>sit w/ Mod Ind, sit<>stand Ind, ambulates ~100' w/ no AD w/ SBA, pt sats 93-98% during tx, performed B LE seated ther ex x 10 reps  -[facility] Name 09/10/22 1308          Vital Signs    Pretreatment Heart Rate (beats/min) 80  -JW     Intratreatment Heart Rate (beats/min) 86  -JW     Posttreatment Heart Rate (beats/min) 85  -JW     Pre SpO2 (%) 98  -JW     O2 Delivery Pre Treatment room air  -JW     Intra SpO2 (%) 93  -JW     O2 Delivery Intra Treatment room air  -JW     Post SpO2 (%) 96  -JW     O2 Delivery Post Treatment room air  -     Pre Patient Position Supine  -JW     Intra Patient Position Standing  -JW     Post Patient Position Supine  -JW     Row Name 09/10/22 1308          Positioning and Restraints    Pre-Treatment Position in bed  -JW     Post Treatment Position bed  -JW     In Bed supine;call light within reach;encouraged to call for assist  -JW     Row Name 09/10/22 1308          Therapy Assessment/Plan (PT)     Rehab Potential (PT) good, to achieve stated therapy goals  -     Criteria for Skilled Interventions Met (PT) yes;meets criteria;skilled treatment is necessary  -     Therapy Frequency (PT) other (see comments)  3-7d/week  -     Row Name 09/10/22 1308          Bed Mobility Goal 1 (PT)    Activity/Assistive Device (Bed Mobility Goal 1, PT) sit to supine;supine to sit  -JW     [place] Level/Cues Needed (Bed Mobility Goal 1, PT) independent  -JW     Time Frame (Bed Mobility Goal 1, PT) by discharge  -JW     Progress/Outcomes (Bed Mobility Goal 1, PT) goal not met  -     Row Name 09/10/22 1308          Transfer Goal 1 (PT)    Activity/Assistive Device (Transfer Goal 1, PT) sit-to-stand/stand-to-sit  -JW     [place] Level/Cues Needed (Transfer Goal 1, PT) independent  -JW     Time Frame (Transfer Goal 1, PT) by discharge  -JW     Progress/Outcome (Transfer Goal 1, PT) goal met  -     Row Name 09/10/22 1308          Gait Training Goal 1 (PT)    Activity/Assistive Device (Gait Training Goal 1, PT) gait (walking locomotion)  -JW     [place] Level (Gait Training Goal 1, PT) independent  -JW     Distance (Gait Training Goal 1, PT) 100'x1  -JW     Time Frame (Gait Training Goal 1, PT) by discharge  -JW     Progress/Outcome (Gait Training Goal 1, PT) goal partially met  -     Row Name 09/10/22 1308          Stairs Goal 1 (PT)    Activity/Assistive Device (Stairs Goal 1, PT) stairs, all skills;ascending stairs;descending stairs  -JW     [place] Level/Cues Needed (Stairs Goal 1, PT) independent  -JW     Number of Stairs (Stairs Goal 1, PT) 2  -JW     Time Frame (Stairs Goal 1, PT) by discharge  -JW     Progress/Outcome (Stairs Goal 1, PT) goal not met  -           User Key  (r) = Recorded By, (t) = Taken By, (c) = Cosigned By    Initials Name Provider Type    Ranjana Khan, PTA Physical Therapist Assistant                Physical Therapy Education                 Title: PT OT SLP  Therapies (Done)     Topic: Physical Therapy (Done)     Point: Mobility training (Done)     Learning Progress Summary           Patient Acceptance, E,TB, VU,NR by LR at 9/8/2022 1415    Comment: Educated on PT POC and goals.                   Point: Home exercise program (Done)     Learning Progress Summary           Patient Acceptance, E,TB, VU,NR by LR at 9/8/2022 1415    Comment: Educated on PT POC and goals.                   Point: Body mechanics (Done)     Learning Progress Summary           Patient Acceptance, E,TB, VU,NR by LR at 9/8/2022 1415    Comment: Educated on PT POC and goals.                   Point: Precautions (Done)     Learning Progress Summary           Patient Acceptance, E,TB, VU,NR by LR at 9/8/2022 1415    Comment: Educated on PT POC and goals.                               User Key     Initials Effective Dates Name Provider Type Discipline     06/16/21 -  Bay Bhakta Physical Therapist PT              PT Recommendation and Plan  Anticipated Discharge Disposition (PT): home, home with assist  Therapy Frequency (PT): other (see comments) (3-7d/week)  Plan of Care Reviewed With: patient  Outcome Evaluation: t/fers sup<>sit w/ Mod Ind, sit<>stand Ind, ambulates ~100' w/ no AD w/ SBA, pt sats 93-98% during tx, performed B LE seated ther ex x 10 reps       Time Calculation:    PT Charges     Row Name 09/10/22 1308             Time Calculation    Start Time 1308  -      Stop Time 1323  -      Time Calculation (min) 15 min  -      PT Received On 09/10/22  -              Time Calculation- PT    Total Timed Code Minutes- PT 15 minute(s)  -            User Key  (r) = Recorded By, (t) = Taken By, (c) = Cosigned By    Initials Name Provider Type     Ranjana Parkinson PTA Physical Therapist Assistant              Therapy Charges for Today     Code Description Service Date Service Provider Modifiers Qty    03484447989 HC GAIT TRAINING EA 15 MIN 9/10/2022 Ranjana Parkinson PTA  GP 1          PT G-Codes  Outcome Measure Options: AM-PAC 6 Clicks Basic Mobility (PT), Tinetti  AM-PAC 6 Clicks Score (PT): 23  Tinetti Total Score: 27    Ranjana Parkinson, PTA  9/10/2022

## 2022-09-11 ENCOUNTER — READMISSION MANAGEMENT (OUTPATIENT)
Dept: CALL CENTER | Facility: HOSPITAL | Age: 57
End: 2022-09-11

## 2022-09-11 VITALS
BODY MASS INDEX: 48.18 KG/M2 | HEIGHT: 60 IN | TEMPERATURE: 96.9 F | WEIGHT: 245.4 LBS | RESPIRATION RATE: 16 BRPM | DIASTOLIC BLOOD PRESSURE: 51 MMHG | HEART RATE: 73 BPM | OXYGEN SATURATION: 91 % | SYSTOLIC BLOOD PRESSURE: 95 MMHG

## 2022-09-11 LAB
ANION GAP SERPL CALCULATED.3IONS-SCNC: 11 MMOL/L (ref 5–15)
BUN SERPL-MCNC: 21 MG/DL (ref 6–20)
BUN/CREAT SERPL: 26.3 (ref 7–25)
CALCIUM SPEC-SCNC: 9.6 MG/DL (ref 8.6–10.5)
CHLORIDE SERPL-SCNC: 98 MMOL/L (ref 98–107)
CO2 SERPL-SCNC: 28 MMOL/L (ref 22–29)
CREAT SERPL-MCNC: 0.8 MG/DL (ref 0.57–1)
EGFRCR SERPLBLD CKD-EPI 2021: 86.1 ML/MIN/1.73
GLUCOSE SERPL-MCNC: 112 MG/DL (ref 65–99)
POTASSIUM SERPL-SCNC: 3.8 MMOL/L (ref 3.5–5.2)
SODIUM SERPL-SCNC: 137 MMOL/L (ref 136–145)
WHOLE BLOOD HOLD SPECIMEN: NORMAL

## 2022-09-11 PROCEDURE — 25010000002 ENOXAPARIN PER 10 MG: Performed by: INTERNAL MEDICINE

## 2022-09-11 PROCEDURE — 80048 BASIC METABOLIC PNL TOTAL CA: CPT | Performed by: STUDENT IN AN ORGANIZED HEALTH CARE EDUCATION/TRAINING PROGRAM

## 2022-09-11 PROCEDURE — 97530 THERAPEUTIC ACTIVITIES: CPT

## 2022-09-11 RX ORDER — FUROSEMIDE 40 MG/1
40 TABLET ORAL DAILY
Qty: 30 TABLET | Refills: 3 | Status: SHIPPED | OUTPATIENT
Start: 2022-09-12 | End: 2022-09-20 | Stop reason: SDUPTHER

## 2022-09-11 RX ADMIN — VALSARTAN 320 MG: 160 TABLET, FILM COATED ORAL at 08:55

## 2022-09-11 RX ADMIN — HYDROCODONE BITARTRATE AND ACETAMINOPHEN 1 TABLET: 7.5; 325 TABLET ORAL at 05:37

## 2022-09-11 RX ADMIN — CETIRIZINE HYDROCHLORIDE 10 MG: 10 TABLET, FILM COATED ORAL at 08:55

## 2022-09-11 RX ADMIN — ENOXAPARIN SODIUM 40 MG: 40 INJECTION SUBCUTANEOUS at 08:55

## 2022-09-11 RX ADMIN — HYDROCODONE BITARTRATE AND ACETAMINOPHEN 1 TABLET: 7.5; 325 TABLET ORAL at 09:29

## 2022-09-11 RX ADMIN — HYDROCODONE BITARTRATE AND ACETAMINOPHEN 1 TABLET: 7.5; 325 TABLET ORAL at 13:03

## 2022-09-11 RX ADMIN — Medication 10 ML: at 08:55

## 2022-09-11 RX ADMIN — FUROSEMIDE 40 MG: 40 TABLET ORAL at 08:55

## 2022-09-11 RX ADMIN — HYDROCODONE BITARTRATE AND ACETAMINOPHEN 1 TABLET: 7.5; 325 TABLET ORAL at 01:26

## 2022-09-11 NOTE — THERAPY TREATMENT NOTE
Acute Care - Physical Therapy Treatment Note  [facility]     Patient Name: Haven Mahoney  : 1965  MRN: [id number]  Today's Date: 2022      Visit Dx:     ICD-10-CM ICD-9-CM   1. Congestive heart failure, unspecified HF chronicity, unspecified heart failure type (HCC)  I50.9 428.0   2. Dyspnea on exertion  R06.00 786.09   3. Impaired functional mobility and activity tolerance  Z74.09 V49.89   4. Nonrheumatic aortic valve stenosis  I35.0 424.1   5. Benign hypertension  I10 401.1   6. Heart failure, unspecified HF chronicity, unspecified heart failure type (HCC)  I50.9 428.9     Patient Active Problem List   Diagnosis   • Lumbosacral spondylosis without myelopathy   • Chronic pain of right knee   • High risk medications (not anticoagulants) long-term use   • Essential hypertension   • Mixed hyperlipidemia   • Heart murmur   • Cellulitis and abscess of foot   • Pyogenic inflammation of bone (HCC)   • Osteomyelitis of great toe of left foot (HCC)   • COVID-19   • Acute decompensated heart failure (HCC)   • Congestive heart failure, unspecified HF chronicity, unspecified heart failure type (HCC)     Past Medical History:   Diagnosis Date   • Anxiety    • Arthritis    • Arthropathy of lumbar facet joint    • Callus    • Chronic depression    • Degeneration of lumbar intervertebral disc    • Drug therapy     Other long term (current) drug therapy      • Epilepsy (HCC)    • Foot pain, left    • Headache    • Hearing loss    • Heart problem    • High blood pressure    • Hypercholesterolemia    • Hypertensive disorder    • Obesity    • Osteoporosis    • Osteoporosis    • Pancreatitis    • Seizures ([facility])    • Varicose veins of lower extremity    • Wears glasses      Past Surgical History:   Procedure Laterality Date   • APPENDECTOMY     • CARDIAC CATHETERIZATION  2013    Cardiac cath 53464 (1)      • CHOLECYSTECTOMY     • INCISION AND DRAINAGE LEG Left 2021    Procedure: Left foot incision and  drainage, tibial sesamoidectomy            (MIN C-ARM);  Surgeon: Gamaliel Starks DPM;  Location: [facility] OR;  Service: Podiatry;  Laterality: Left;   • INCISION AND DRAINAGE LEG Left 3/4/2021    Procedure: Left foot incision and drainage, bone biopsy;  Surgeon: Gamaliel Starks DPM;  Location: [facility] OR;  Service: Podiatry;  Laterality: Left;   • INJECTION OF MEDICATION  03/28/2016    Injection for nerve block (2)      • OTHER SURGICAL HISTORY  05/05/2016    Incise spinal column/nerves (1)        PT Assessment (last 12 hours)     PT Evaluation and Treatment     Row Name 09/11/22 0925          Physical Therapy Time and Intention    Subjective Information no complaints  -     Document Type therapy note (daily note)  -     Mode of Treatment individual therapy;physical therapy  -     Patient Effort excellent  -     Row Name 09/11/22 0925          General Information    Patient Profile Reviewed yes  -     Existing Precautions/Restrictions fall  -     Row Name 09/11/22 0925          Cognition    Orientation Status (Cognition) oriented x 4  -     Row Name 09/11/22 0925          Bed Mobility    Bed Mobility supine-sit;sit-supine  -     Supine-Sit [place] (Bed Mobility) modified independence  -     Sit-Supine [place] (Bed Mobility) modified independence  -     Assistive Device (Bed Mobility) head of bed elevated  -     Row Name 09/11/22 0925          Transfers    Transfers sit-stand transfer;stand-sit transfer  -     Sit-Stand [place] (Transfers) independent  -     Stand-Sit [place] (Transfers) independent  -     Row Name 09/11/22 0925          Sit-Stand Transfer    Assistive Device (Sit-Stand Transfers) other (see comments)  no AD  -     Row Name 09/11/22 0925          Stand-Sit Transfer    Assistive Device (Stand-Sit Transfers) other (see comments)  no AD  -     Row Name 09/11/22 0925          Gait/Stairs (Locomotion)    [place] Level (Gait) standby  assist;independent  -     Assistive Device (Gait) other (see comments)  no AD  -     Distance in Feet (Gait) 100'  -     Deviations/Abnormal Patterns (Gait) stride length decreased  -[facility] Name 09/11/22 0925          Safety Issues, Functional Mobility    Impairments Affecting Function (Mobility) balance;coordination;shortness of breath;endurance/activity tolerance  -[facility] Name 09/11/22 0925          Hip (Therapeutic Exercise)    Hip AROM (Therapeutic Exercise) bilateral;flexion;sitting;10 repetitions  -[facility] Name 09/11/22 0925          Knee (Therapeutic Exercise)    Knee AROM (Therapeutic Exercise) bilateral;LAQ (long arc quad);sitting;10 repetitions  -[facility] Name 09/11/22 0925          Ankle (Therapeutic Exercise)    Ankle AROM (Therapeutic Exercise) bilateral;plantarflexion;dorsiflexion;sitting;10 repetitions  -[facility] Name 09/11/22 0925          Plan of Care Review    Plan of Care Reviewed With patient  -     Outcome Evaluation t/fers sup<>sit w/ Mod Ind, sit<>stand Ind, ambulates ~100' w/ no AD w/ SBA/Ind, pt performs B LE seated ther ex x 10 reps  -[facility] Name 09/11/22 0925          Positioning and Restraints    Pre-Treatment Position in bed  -     Post Treatment Position bed  -JW     In Bed supine;call light within reach;encouraged to call for assist  -[facility] Name 09/11/22 0925          Therapy Assessment/Plan (PT)    Rehab Potential (PT) good, to achieve stated therapy goals  -     Criteria for Skilled Interventions Met (PT) yes;meets criteria;skilled treatment is necessary  -     Therapy Frequency (PT) other (see comments)  3-7d/week  -[facility] Name 09/11/22 0925          Bed Mobility Goal 1 (PT)    Activity/Assistive Device (Bed Mobility Goal 1, PT) sit to supine;supine to sit  -     [place] Level/Cues Needed (Bed Mobility Goal 1, PT) independent  -     Time Frame (Bed Mobility Goal 1, PT) by discharge  -     Progress/Outcomes (Bed Mobility Goal 1,  PT) goal not met  -JW     Row Name 09/11/22 0925          Transfer Goal 1 (PT)    Activity/Assistive Device (Transfer Goal 1, PT) sit-to-stand/stand-to-sit  -JW     [place] Level/Cues Needed (Transfer Goal 1, PT) independent  -JW     Time Frame (Transfer Goal 1, PT) by discharge  -JW     Progress/Outcome (Transfer Goal 1, PT) goal met  -JW     Row Name 09/11/22 0925          Gait Training Goal 1 (PT)    Activity/Assistive Device (Gait Training Goal 1, PT) gait (walking locomotion)  -JW     [place] Level (Gait Training Goal 1, PT) independent  -JW     Distance (Gait Training Goal 1, PT) 100'x1  -JW     Time Frame (Gait Training Goal 1, PT) by discharge  -JW     Progress/Outcome (Gait Training Goal 1, PT) goal partially met  -     Row Name 09/11/22 0925          Stairs Goal 1 (PT)    Activity/Assistive Device (Stairs Goal 1, PT) stairs, all skills;ascending stairs;descending stairs  -JW     [place] Level/Cues Needed (Stairs Goal 1, PT) independent  -JW     Number of Stairs (Stairs Goal 1, PT) 2  -JW     Time Frame (Stairs Goal 1, PT) by discharge  -JW     Progress/Outcome (Stairs Goal 1, PT) goal not met  -JW           User Key  (r) = Recorded By, (t) = Taken By, (c) = Cosigned By    Initials Name Provider Type    Ranjana Khan, PTA Physical Therapist Assistant                Physical Therapy Education                 Title: PT OT SLP Therapies (Done)     Topic: Physical Therapy (Done)     Point: Mobility training (Done)     Learning Progress Summary           Patient Acceptance, E,TB, VU,NR by LR at 9/8/2022 1415    Comment: Educated on PT POC and goals.                   Point: Home exercise program (Done)     Learning Progress Summary           Patient Acceptance, E,TB, VU,NR by LR at 9/8/2022 1415    Comment: Educated on PT POC and goals.                   Point: Body mechanics (Done)     Learning Progress Summary           Patient Acceptance, E,TB, VU,NR by LR at 9/8/2022 1415     Comment: Educated on PT POC and goals.                   Point: Precautions (Done)     Learning Progress Summary           Patient Acceptance, E,TB, VU,NR by LR at 9/8/2022 1415    Comment: Educated on PT POC and goals.                               User Key     Initials Effective Dates Name Provider Type Discipline    [place] 06/16/21 -  Bay Bhakta Physical Therapist PT              PT Recommendation and Plan  Anticipated Discharge Disposition (PT): home, home with assist  Therapy Frequency (PT): other (see comments) (3-7d/week)  Plan of Care Reviewed With: patient  Outcome Evaluation: t/fers sup<>sit w/ Mod Ind, sit<>stand Ind, ambulates ~100' w/ no AD w/ SBA/Ind, pt performs B LE seated ther ex x 10 reps       Time Calculation:    PT Charges     Row Name 09/11/22 0925             Time Calculation    Start Time 0925  -      Stop Time 0940  -      Time Calculation (min) 15 min  -      PT Received On 09/11/22  -              Time Calculation- PT    Total Timed Code Minutes- PT 15 minute(s)  -            User Key  (r) = Recorded By, (t) = Taken By, (c) = Cosigned By    Initials Name Provider Type    Ranjana Khan PTA Physical Therapist Assistant              Therapy Charges for Today     Code Description Service Date Service Provider Modifiers Qty    64899310693 HC GAIT TRAINING EA 15 MIN 9/10/2022 Ranjana Parkinson PTA GP 1    01766837455 HC PT THERAPEUTIC ACT EA 15 MIN 9/11/2022 Ranjana Parkinson PTA GP 1          PT G-Codes  Outcome Measure Options: AM-PAC 6 Clicks Basic Mobility (PT), Tinetti  AM-PAC 6 Clicks Score (PT): 23  Tinetti Total Score: 27    Ranjana Parkinson PTA  9/11/2022

## 2022-09-11 NOTE — PLAN OF CARE
Goal Outcome Evaluation:  Plan of Care Reviewed With: patient           Outcome Evaluation: t/fers sup<>sit w/ Mod Ind, sit<>stand Ind, ambulates ~100' w/ no AD w/ SBA/Ind, pt performs B LE seated ther ex x 10 reps

## 2022-09-11 NOTE — DISCHARGE SUMMARY
[facility] Medicine Services  DISCHARGE SUMMARY       Date of Admission: 9/7/2022  Date of Discharge:  9/11/2022  Primary Care Physician: Asif Lin MD    Presenting Problem/History of Present Illness:  Dyspnea on exertion [R06.00]  Congestive heart failure, unspecified HF chronicity, unspecified heart failure type (HCC) [I50.9]     Final Discharge Diagnoses:  Active Hospital Problems    Diagnosis    • **Acute decompensated heart failure (HCC)    • Congestive heart failure, unspecified HF chronicity, unspecified heart failure type (HCC)    • Essential hypertension    • Mixed hyperlipidemia        Consults:   Consults     No orders found from 8/9/2022 to 9/8/2022.        Pertinent Test Results:   Lab Results (last 24 hours)     Procedure Component Value Units Date/Time    Extra Tubes [[id number]] Collected: 09/11/22 0622    Specimen: Blood, Venous Line Updated: 09/11/22 0732    Narrative:      The following orders were created for panel order Extra Tubes.  Procedure                               Abnormality         Status                     ---------                               -----------         ------                     Lavender Top[[id number]]                                     Final result                 Please view results for these tests on the individual orders.    Lavender Top [[id number]] Collected: 09/11/22 0622    Specimen: Blood Updated: 09/11/22 0732     Extra Tube hold for add-on     Comment: Auto resulted       Basic Metabolic Panel [[id number]]  (Abnormal) Collected: 09/11/22 0528    Specimen: Blood Updated: 09/11/22 0639     Glucose 112 mg/dL      BUN 21 mg/dL      Creatinine 0.80 mg/dL      Sodium 137 mmol/L      Potassium 3.8 mmol/L      Chloride 98 mmol/L      CO2 28.0 mmol/L      Calcium 9.6 mg/dL      BUN/Creatinine Ratio 26.3     Anion Gap 11.0 mmol/L      eGFR 86.1 mL/min/1.73      Comment: National Kidney Foundation and American Society of Nephrology (ASN) Task Force  "recommended calculation based on the Chronic Kidney Disease Epidemiology Collaboration (CKD-EPI) equation refit without adjustment for race.       Narrative:      GFR Normal >60  Chronic Kidney Disease <60  Kidney Failure <15      Blood Culture - Blood, Arm, Left [[id number]]  (Normal) Collected: 09/07/22 2206    Specimen: Blood from Arm, Left Updated: 09/10/22 2217     Blood Culture No growth at 3 days        Imaging Results (Last 24 Hours)     ** No results found for the last 24 hours. **        Chief Complaint on Day of Discharge: No complaints    Hospital Course:  56 yr old female with history of HTN and HLD was admitted due to concern for decompensated heart failure in the setting of shortness of air on exertion, increased b/l lower extremity edema, abdominal swelling and orthopnea. CXR showed pulmonary edema. Patient was admitted and treated with IV Lasix and  diuresed well. Echo was done on 9/7/22 showed LVEF 51-55% with grade 1 diastolic dysfunction. The patient was discharged home on Lasix 40 mg daily.  Follow with PCP and cardiology in one week.     Condition on Discharge:  Stable.     Physical Exam on Discharge:  BP 95/51 (BP Location: Right arm, Patient Position: Lying)   Pulse 73   Temp 96.9 °F (36.1 °C) (Infrared)   Resp 16   Ht 152.4 cm (60\")   Wt 111 kg (245 lb 6.4 oz)   SpO2 91%   BMI 47.93 kg/m²   Physical Exam  Constitutional:       Appearance: She is well-developed.   HENT:      Head: Normocephalic and atraumatic.   Eyes:      Pupils: Pupils are equal, round, and reactive to light.   Cardiovascular:      Rate and Rhythm: Normal rate and regular rhythm.   Pulmonary:      Effort: Pulmonary effort is normal.      Breath sounds: Normal breath sounds.   Abdominal:      General: Bowel sounds are normal.      Palpations: Abdomen is soft.   Musculoskeletal:         General: Normal range of motion.      Cervical back: Normal range of motion and neck supple.   Skin:     General: Skin is warm and dry. "   Neurological:      Mental Status: She is alert and oriented to person, place, and time.   Psychiatric:         Behavior: Behavior normal.       Discharge Disposition:  Home or Self Care    Discharge Medications:     Discharge Medications      New Medications      Instructions Start Date   furosemide 40 MG tablet  Commonly known as: LASIX   40 mg, Oral, Daily   Start Date: September 12, 2022        Continue These Medications      Instructions Start Date   fluticasone 50 MCG/ACT nasal spray  Commonly known as: FLONASE   1 spray, Nasal, As Needed      gabapentin 300 MG capsule  Commonly known as: NEURONTIN   600 mg, Oral, Nightly      HYDROcodone-acetaminophen 7.5-325 MG per tablet  Commonly known as: NORCO   1 tablet, Oral, 5 Times Daily PRN      Loratadine 10 MG capsule   10 mg, Oral, Daily      rosuvastatin 5 MG tablet  Commonly known as: CRESTOR  Notes to patient: Take as directed   5 mg, Oral, 3 Times Weekly, Monday, Wednesday, Friday      valsartan 320 MG tablet  Commonly known as: DIOVAN   320 mg, Oral, Daily             Discharge Diet:   Diet Instructions     Diet: Cardiac, Specialty Diet; Thin Liquids, No Restrictions; Low Sodium      Discharge Diet:  Cardiac  Specialty Diet       Fluid Consistency: Thin Liquids, No Restrictions    Specialty Diets: Low Sodium          Activity at Discharge:   Activity Instructions     Activity as Tolerated            Discharge Care Plan/Instructions: As above.     Follow-up Appointment:   Follow-up Information     Asif Lin MD Follow up in 1 week(s).    Specialty: Family Medicine  Why: OFFICE WILL CALL TO SCHEDULE HOSPITAL FOLLOW UP APPOINTMENT  Contact information:  [address] [facility] [address] [phone]             Amna Yu MD Follow up in 1 week(s).    Specialty: Cardiology  Why: [facility]/ hospital follow up  OFFICE WILL CALL TO SCHEDULE HOSPITAL FOLLOW UP APPOINTMENT  Contact information:  [address] [place] [address] [address]  [address]  [place]  [facility] [address]  [phone]                         Test Results Pending at Discharge:   Pending Labs     Order Current Status    Blood Culture - Blood, Arm, Left Preliminary result            This document has been electronically signed by MORENITA Uribe on September 11, 2022 13:37 CDT        Time: Greater than 30 minutes.

## 2022-09-12 LAB — BACTERIA SPEC AEROBE CULT: NORMAL

## 2022-09-12 NOTE — PAYOR COMM NOTE
"[facility]  Case Managment Extender   Minoo Coleman  (P) [phone]  (F) [phone]        REF# [id number]          Zenaida Li (56 y.o. Female)             Date of Birth   1965    Social Security Number       Address   [address] [place] [address]    Home Phone   [phone]    MRN   [id number]       [employer]   [facility]    Marital Status                               Admission Date   9/7/22    Admission Type   Emergency    Admitting Provider   Behroozi, Saeid, MD    Attending Provider       Department, Room/Bed   [facility] [address] [place], 318/1       Discharge Date   9/11/2022    Discharge Disposition   Home or Self Care    Discharge Destination                               Attending Provider: (none)   Allergies: Amoxicillin-pot Clavulanate, Clarithromycin, Ibuprofen, Naproxen, Nsaids, Other    Isolation: None   Infection: None   Code Status: Prior   Advance Care Planning Activity    Ht: 152.4 cm (60\")   Wt: 111 kg (245 lb 6.4 oz)    Admission Cmt: None   Principal Problem: Acute decompensated heart failure (HCC) [I50.9]                 Active Insurance as of 9/7/2022     Primary Coverage     Payor Plan Insurance Group Employer/Plan Group    HUMANA MEDICARE REPLACEMENT HUMANA MEDICARE REPLACEMENT [id number]     Payor Plan Address Payor Plan Phone Number Payor Plan Fax Number Effective Dates    [address] [address] [phone]  1/1/2022 - None Entered    [facility] [id number]       Subscriber Name Subscriber Birth Date Member ID       ZENAIDA LI 1965 [id number]                 Emergency Contacts      (Rel.) Home Phone Work Phone Mobile Phone    Gigi Li (Spouse) [phone] -- [phone]               Discharge Summary      Lolis Paiz APRN at 09/11/22 1336              [facility] Medicine Services  DISCHARGE SUMMARY       Date of Admission: 9/7/2022  Date of Discharge:  " 9/11/2022  Primary Care Physician: Asif Lin MD    Presenting Problem/History of Present Illness:  Dyspnea on exertion [R06.00]  Congestive heart failure, unspecified HF chronicity, unspecified heart failure type (HCC) [I50.9]     Final Discharge Diagnoses:  Active Hospital Problems    Diagnosis    • **Acute decompensated heart failure (HCC)    • Congestive heart failure, unspecified HF chronicity, unspecified heart failure type (HCC)    • Essential hypertension    • Mixed hyperlipidemia        Consults:   Consults     No orders found from 8/9/2022 to 9/8/2022.        Pertinent Test Results:   Lab Results (last 24 hours)     Procedure Component Value Units Date/Time    Extra Tubes [[id number]] Collected: 09/11/22 0622    Specimen: Blood, Venous Line Updated: 09/11/22 0732    Narrative:      The following orders were created for panel order Extra Tubes.  Procedure                               Abnormality         Status                     ---------                               -----------         ------                     Lavender Top[[id number]]                                     Final result                 Please view results for these tests on the individual orders.    Lavender Top [[id number]] Collected: 09/11/22 0622    Specimen: Blood Updated: 09/11/22 0732     Extra Tube hold for add-on     Comment: Auto resulted       Basic Metabolic Panel [[id number]]  (Abnormal) Collected: 09/11/22 0528    Specimen: Blood Updated: 09/11/22 0639     Glucose 112 mg/dL      BUN 21 mg/dL      Creatinine 0.80 mg/dL      Sodium 137 mmol/L      Potassium 3.8 mmol/L      Chloride 98 mmol/L      CO2 28.0 mmol/L      Calcium 9.6 mg/dL      BUN/Creatinine Ratio 26.3     Anion Gap 11.0 mmol/L      eGFR 86.1 mL/min/1.73      Comment: National Kidney Foundation and American Society of Nephrology (ASN) Task Force recommended calculation based on the Chronic Kidney Disease Epidemiology Collaboration (CKD-EPI) equation refit without  "adjustment for race.       Narrative:      GFR Normal >60  Chronic Kidney Disease <60  Kidney Failure <15      Blood Culture - Blood, Arm, Left [[id number]]  (Normal) Collected: 09/07/22 2206    Specimen: Blood from Arm, Left Updated: 09/10/22 2217     Blood Culture No growth at 3 days        Imaging Results (Last 24 Hours)     ** No results found for the last 24 hours. **        Chief Complaint on Day of Discharge: No complaints    Hospital Course:  56 yr old female with history of HTN and HLD was admitted due to concern for decompensated heart failure in the setting of shortness of air on exertion, increased b/l lower extremity edema, abdominal swelling and orthopnea. CXR showed pulmonary edema. Patient was admitted and treated with IV Lasix and  diuresed well. Echo was done on 9/7/22 showed LVEF 51-55% with grade 1 diastolic dysfunction. The patient was discharged home on Lasix 40 mg daily.  Follow with PCP and cardiology in one week.     Condition on Discharge:  Stable.     Physical Exam on Discharge:  BP 95/51 (BP Location: Right arm, Patient Position: Lying)   Pulse 73   Temp 96.9 °F (36.1 °C) (Infrared)   Resp 16   Ht 152.4 cm (60\")   Wt 111 kg (245 lb 6.4 oz)   SpO2 91%   BMI 47.93 kg/m²   Physical Exam  Constitutional:       Appearance: She is well-developed.   HENT:      Head: Normocephalic and atraumatic.   Eyes:      Pupils: Pupils are equal, round, and reactive to light.   Cardiovascular:      Rate and Rhythm: Normal rate and regular rhythm.   Pulmonary:      Effort: Pulmonary effort is normal.      Breath sounds: Normal breath sounds.   Abdominal:      General: Bowel sounds are normal.      Palpations: Abdomen is soft.   Musculoskeletal:         General: Normal range of motion.      Cervical back: Normal range of motion and neck supple.   Skin:     General: Skin is warm and dry.   Neurological:      Mental Status: She is alert and oriented to person, place, and time.   Psychiatric:         " Behavior: Behavior normal.       Discharge Disposition:  Home or Self Care    Discharge Medications:     Discharge Medications      New Medications      Instructions Start Date   furosemide 40 MG tablet  Commonly known as: LASIX   40 mg, Oral, Daily   Start Date: September 12, 2022        Continue These Medications      Instructions Start Date   fluticasone 50 MCG/ACT nasal spray  Commonly known as: FLONASE   1 spray, Nasal, As Needed      gabapentin 300 MG capsule  Commonly known as: NEURONTIN   600 mg, Oral, Nightly      HYDROcodone-acetaminophen 7.5-325 MG per tablet  Commonly known as: NORCO   1 tablet, Oral, 5 Times Daily PRN      Loratadine 10 MG capsule   10 mg, Oral, Daily      rosuvastatin 5 MG tablet  Commonly known as: CRESTOR  Notes to patient: Take as directed   5 mg, Oral, 3 Times Weekly, Monday, Wednesday, Friday      valsartan 320 MG tablet  Commonly known as: DIOVAN   320 mg, Oral, Daily             Discharge Diet:   Diet Instructions     Diet: Cardiac, Specialty Diet; Thin Liquids, No Restrictions; Low Sodium      Discharge Diet:  Cardiac  Specialty Diet       Fluid Consistency: Thin Liquids, No Restrictions    Specialty Diets: Low Sodium          Activity at Discharge:   Activity Instructions     Activity as Tolerated            Discharge Care Plan/Instructions: As above.     Follow-up Appointment:   Follow-up Information     Asif Lin MD Follow up in 1 week(s).    Specialty: Family Medicine  Why: OFFICE WILL CALL TO SCHEDULE HOSPITAL FOLLOW UP APPOINTMENT  Contact information:  [address] [place] [address]  [phone]             Amna Yu MD Follow up in 1 week(s).    Specialty: Cardiology  Why: CHF/ hospital follow up  OFFICE WILL CALL TO SCHEDULE HOSPITAL FOLLOW UP APPOINTMENT  Contact information:  [address] [place] [address] [address]  [address] [place] [id number] [phone]                         Test Results Pending at Discharge:   Pending Labs     Order  Current Status    Blood Culture - Blood, Arm, Left Preliminary result            This document has been electronically signed by MORENITA Uribe on September 11, 2022 13:37 CDT        Time: Greater than 30 minutes.                 Electronically signed by Lolis Paiz APRN at 09/11/22 8752

## 2022-09-12 NOTE — OUTREACH NOTE
Prep Survey    Flowsheet Row Responses   [facility] [employer] facility patient discharged from? [facility]   Is LACE score < 7 ? No   Emergency Room discharge w/ pulse ox? No   Eligibility Readm Mgmt   Discharge diagnosis Acute decompensated heart failure    Does the patient have one of the following disease processes/diagnoses(primary or secondary)? CHF   Does the patient have Home health ordered? No   Is there a DME ordered? No   Medication alerts for this patient Lasix   Prep survey completed? Yes          MARISOL TRUJILLO - Registered Nurse

## 2022-09-12 NOTE — PAYOR COMM NOTE
"  Psychiatric  Case Managment Extender   Minoo Coleman  (P) [phone]  (F) [phone]        REF# [id number]  Zenaida Li (56 y.o. Female)             Date of Birth   1965    Social Security Number       Address   [address] [place] [address]    Home Phone   [phone]    MRN   [id number]       [facility]   [facility]    Marital Status                               Admission Date   9/7/22    Admission Type   Emergency    Admitting Provider   Behroozi, Saeid, MD    Attending Provider       Department, Room/Bed   [facility] [address] [place], 318/1       Discharge Date   9/11/2022    Discharge Disposition   Home or Self Care    Discharge Destination                               Attending Provider: (none)   Allergies: Amoxicillin-pot Clavulanate, Clarithromycin, Ibuprofen, Naproxen, Nsaids, Other    Isolation: None   Infection: None   Code Status: Prior   Advance Care Planning Activity    Ht: 152.4 cm (60\")   Wt: 111 kg (245 lb 6.4 oz)    Admission Cmt: None   Principal Problem: Acute decompensated heart failure (HCC) [I50.9]                 Active Insurance as of 9/7/2022     Primary Coverage     Payor Plan Insurance Group Employer/Plan Group    HUMANA MEDICARE REPLACEMENT HUMANA MEDICARE REPLACEMENT [id number]     Payor Plan Address Payor Plan Phone Number Payor Plan Fax Number Effective Dates    [address] [address] [phone]  1/1/2022 - None Entered    [facility] [address]       Subscriber Name Subscriber Birth Date Member ID       ZENAIDA LI 1965 [id number]                 Emergency Contacts      (Rel.) Home Phone Work Phone Mobile Phone    Gigi Li (Spouse) [phone] -- [phone]               History & Physical      Behroozi, Saeid, MD at 09/07/22 2038                [facility] Medicine Admission      Date of Admission: 9/7/2022      Primary Care " Physician: Asif Lin MD      Chief Complaint: Shortness of breath    HPI:    Patient is a morbidly obese 56-year-old female with BMI of 48 with known past medical history of hypertension chronic back pain on narcotics, anxiety osteoporosis, seizures, pancreatitis who presented to ER with complaint of retaining fluid for 1 to 2 weeks associated with shortness of breath for 3 days.  Patient was given diuretic in ED.  CTA of the chest was negative for obvious PE.  Hospitalist service was called for observation of the patient.    Patient was seen and examined in ED room 4.  Patient reports for the last 2 weeks she noticed worsening swelling to her bilateral lower extremities.  She denies taking any diuretics outpatient.  For the last 3 days she had worsening dyspnea on exertion, shortness of breath at rest, and was feeling generally weak and tired.  She had some chest tightness but denies any particular chest pain.  She denies any fall injury trauma fever chills headache sore throat anginal-like chest pain, syncope near syncope palpitation abdominal pain, nausea vomiting diarrhea constipation, bleeding, UTI like symptoms in particular.  She reports 15 pounds weight gain.    Concurrent Medical History:  has a past medical history of Anxiety, Arthritis, Arthropathy of lumbar facet joint, Callus, Chronic depression, Degeneration of lumbar intervertebral disc, Drug therapy, Epilepsy (HCC), Foot pain, left, Headache, Hearing loss, Heart problem, High blood pressure, Hypercholesterolemia, Hypertensive disorder, Obesity, Osteoporosis, Osteoporosis, Pancreatitis, Seizures (HCC), Varicose veins of lower extremity, and Wears glasses.    Past Surgical History:  has a past surgical history that includes Cardiac catheterization (06/05/2013); Other surgical history (05/05/2016); Injection of Medication (03/28/2016); Appendectomy; Cholecystectomy; incision and drainage leg (Left, 1/29/2021); and incision and drainage leg (Left,  3/4/2021).    Family History: family history includes Asthma in her mother; Cancer in her father, maternal grandmother, maternal uncle, and mother; Diabetes in her maternal grandmother and mother; Heart disease in her maternal grandfather and mother; Hyperlipidemia in her father and mother; Mental illness in an other family member; Migraines in her mother; Osteoporosis in her mother.     Social History:  reports that she has quit smoking. She has never used smokeless tobacco. She reports that she does not drink alcohol and does not use drugs.    Allergies:   Allergies   Allergen Reactions   • Amoxicillin-Pot Clavulanate Hives   • Clarithromycin Hives   • Ibuprofen Hives and Nausea And Vomiting   • Naproxen Hives and Nausea And Vomiting   • Nsaids Hives   • Other Other (See Comments)     Hx of MRSA       Medications:   Prior to Admission medications    Medication Sig Start Date End Date Taking? Authorizing Provider   gabapentin (NEURONTIN) 300 MG capsule Take 300 mg by mouth 3 (Three) Times a Day. 7/26/22  Yes Concepción Hancock MD   HYDROcodone-acetaminophen (NORCO) 7.5-325 MG per tablet Take 1 tablet by mouth. 7/29/22  Yes Concepción Hancock MD   Loratadine 10 MG capsule Take 10 mg by mouth Daily.   Yes Concepción Hancock MD   rosuvastatin (CRESTOR) 5 MG tablet Take 5 mg by mouth. 5/20/22 5/21/23 Yes Concepción Hancock MD   valsartan (Diovan) 320 MG tablet Take 0.5 tablets by mouth Daily. 5/3/21  Yes Domingo Hudson MD   Famotidine (PEPCID AC PO) Take 20 mg by mouth Daily.    ProviderConcepción MD   fluticasone (FLONASE) 50 MCG/ACT nasal spray 1 spray into the nostril(s) as directed by provider As Needed.    Concepción Hancock MD   furosemide (LASIX) 20 MG tablet Take 20 mg by mouth. 3/15/21 3/31/21  Concepción Hancock MD   gabapentin (NEURONTIN) 300 MG capsule Take 300 mg by mouth Every Night. Tabs 2 12/23/16   Concepción Hancock MD   HYDROcodone-acetaminophen (NORCO) 7.5-325 MG per  tablet  3/11/21   Provider, MD Concepción   sucralfate (CARAFATE) 1 g tablet  12/22/20   Provider, MD Concepción       Review of Systems:  Review of Systems   Constitutional: Positive for activity change and fatigue. Negative for chills, diaphoresis and fever.   HENT: Negative for congestion, dental problem, ear pain, facial swelling, rhinorrhea and sinus pressure.    Eyes: Negative for photophobia, discharge, redness, itching and visual disturbance.   Respiratory: Positive for chest tightness and shortness of breath. Negative for apnea, cough, choking, wheezing and stridor.    Cardiovascular: Positive for leg swelling. Negative for chest pain and palpitations.   Gastrointestinal: Negative for abdominal distention, abdominal pain, anal bleeding, blood in stool, diarrhea, nausea, rectal pain and vomiting.   Endocrine: Negative for cold intolerance, heat intolerance, polydipsia, polyphagia and polyuria.   Genitourinary: Negative for difficulty urinating, flank pain, frequency, hematuria and urgency.   Musculoskeletal: Negative for arthralgias, back pain, joint swelling and myalgias.   Skin: Negative for pallor, rash and wound.   Allergic/Immunologic: Negative for environmental allergies and immunocompromised state.   Neurological: Negative for dizziness, tremors, seizures, facial asymmetry, speech difficulty, weakness, light-headedness, numbness and headaches.   Hematological: Negative for adenopathy. Does not bruise/bleed easily.   Psychiatric/Behavioral: Negative for agitation, behavioral problems and hallucinations. The patient is not nervous/anxious.       Otherwise complete ROS is negative except as mentioned above.    Physical Exam:   Temp:  [97.9 °F (36.6 °C)] 97.9 °F (36.6 °C)  Heart Rate:  [80-96] 82  Resp:  [16-26] 20  BP: (164-206)/() 164/78  Physical Exam  Constitutional:       General: She is not in acute distress.     Appearance: She is obese. She is not ill-appearing, toxic-appearing or  diaphoretic.   HENT:      Head: Normocephalic and atraumatic.      Right Ear: External ear normal.      Left Ear: External ear normal.      Nose: Nose normal.      Mouth/Throat:      Mouth: Mucous membranes are moist.      Pharynx: Oropharynx is clear.   Eyes:      Extraocular Movements: Extraocular movements intact.      Conjunctiva/sclera: Conjunctivae normal.      Pupils: Pupils are equal, round, and reactive to light.   Cardiovascular:      Rate and Rhythm: Regular rhythm. Tachycardia present.      Heart sounds: No murmur heard.    No friction rub. No gallop.   Pulmonary:      Effort: No respiratory distress.      Breath sounds: No stridor. No wheezing or rales.      Comments: Decreased breath sounds  Chest:      Chest wall: No tenderness.   Abdominal:      General: Abdomen is flat. There is no distension.      Palpations: Abdomen is soft.      Tenderness: There is no abdominal tenderness. There is no guarding or rebound.      Comments: Large abdominal pannus   Musculoskeletal:         General: No swelling or tenderness.      Cervical back: No rigidity or tenderness.      Right lower leg: Edema (mild) present.      Left lower leg: Left lower leg edema: mild.   Lymphadenopathy:      Cervical: No cervical adenopathy.   Skin:     General: Skin is warm and dry.      Coloration: Skin is not jaundiced.      Findings: No erythema.   Neurological:      Mental Status: She is alert and oriented to person, place, and time. Mental status is at baseline.      Sensory: No sensory deficit.      Motor: No weakness.      Coordination: Coordination normal.   Psychiatric:         Behavior: Behavior normal.         Judgment: Judgment normal.      Comments: Anxious           Results Reviewed:  I have personally reviewed current lab, radiology, and data and agree with results.  Lab Results (last 24 hours)     Procedure Component Value Units Date/Time    Troponin [[id number]]  (Normal) Collected: 09/07/22 4209    Specimen: Blood  Updated: 09/07/22 1833     Troponin T <0.010 ng/mL     Narrative:      Troponin T Reference Range:  <= 0.03 ng/mL-   Negative for AMI  >0.03 ng/mL-     Abnormal for myocardial necrosis.  Clinicians would have to utilize clinical acumen, EKG, Troponin and serial changes to determine if it is an Acute Myocardial Infarction or myocardial injury due to an underlying chronic condition.       Results may be falsely decreased if patient taking Biotin.      Magnesium [[id number]]  (Normal) Collected: 09/07/22 1544    Specimen: Blood Updated: 09/07/22 1759     Magnesium 1.8 mg/dL     COVID-19 and FLU A/B PCR - Swab, Nasopharynx [[id number]]  (Normal) Collected: 09/07/22 1642    Specimen: Swab from Nasopharynx Updated: 09/07/22 1732     COVID19 Not Detected     Influenza A PCR Not Detected     Influenza B PCR Not Detected    Narrative:      Fact sheet for providers: https://www.fda.gov/media/668182/download    Fact sheet for patients: https://www.fda.gov/media/060156/download    Test performed by PCR.    D-dimer, Quantitative [[id number]]  (Abnormal) Collected: 09/07/22 1544    Specimen: Blood Updated: 09/07/22 1615     D-Dimer, Quantitative 830 ng/mL (FEU)     Narrative:      Dimer values <500 ng/ml FEU are FDA approved as aid in diagnosis of deep venous thrombosis and pulmonary embolism.  This test should not be used in an exclusion strategy with pretest probability alone.    A recent guideline regarding diagnosis for pulmonary thromboembolism recommends an adjusted exclusion criterion of age x 10 ng/ml FEU for patients >50 years of age ([employer] Intern Med 2015; 163: 701-711).      Comprehensive Metabolic Panel [[id number]]  (Abnormal) Collected: 09/07/22 1544    Specimen: Blood Updated: 09/07/22 1609     Glucose 97 mg/dL      BUN 7 mg/dL      Creatinine 0.63 mg/dL      Sodium 141 mmol/L      Potassium 3.2 mmol/L      Chloride 103 mmol/L      CO2 29.0 mmol/L      Calcium 9.5 mg/dL      Total Protein 7.3 g/dL      Albumin 4.10 g/dL       ALT (SGPT) 12 U/L      AST (SGOT) 19 U/L      Alkaline Phosphatase 111 U/L      Total Bilirubin 0.6 mg/dL      Globulin 3.2 gm/dL      A/G Ratio 1.3 g/dL      BUN/Creatinine Ratio 11.1     Anion Gap 9.0 mmol/L      eGFR 103.6 mL/min/1.73      Comment: National Kidney Foundation and American Society of Nephrology (ASN) Task Force recommended calculation based on the Chronic Kidney Disease Epidemiology Collaboration (CKD-EPI) equation refit without adjustment for race.       Narrative:      GFR Normal >60  Chronic Kidney Disease <60  Kidney Failure <15      Troponin [[id number]]  (Normal) Collected: 09/07/22 1544    Specimen: Blood Updated: 09/07/22 1609     Troponin T <0.010 ng/mL     Narrative:      Troponin T Reference Range:  <= 0.03 ng/mL-   Negative for AMI  >0.03 ng/mL-     Abnormal for myocardial necrosis.  Clinicians would have to utilize clinical acumen, EKG, Troponin and serial changes to determine if it is an Acute Myocardial Infarction or myocardial injury due to an underlying chronic condition.       Results may be falsely decreased if patient taking Biotin.      BNP [[id number]]  (Normal) Collected: 09/07/22 1544    Specimen: Blood Updated: 09/07/22 1608     proBNP 586.0 pg/mL     Narrative:      Among patients with dyspnea, NT-proBNP is highly sensitive for the detection of acute congestive heart failure. In addition NT-proBNP of <300 pg/ml effectively rules out acute congestive heart failure with 99% negative predictive value.    Results may be falsely decreased if patient taking Biotin.      Urinalysis, Microscopic Only - Urine, Clean Catch [[id number]]  (Abnormal) Collected: 09/07/22 1545    Specimen: Urine, Clean Catch Updated: 09/07/22 1600     RBC, UA 0-2 /HPF      WBC, UA 0-2 /HPF      Bacteria, UA None Seen /HPF      Squamous Epithelial Cells, UA 0-2 /HPF      Hyaline Casts, UA None Seen /LPF      Methodology Automated Microscopy    Urinalysis With Microscopic If Indicated (No Culture) -  Urine, Clean Catch [[id number]]  (Abnormal) Collected: 09/07/22 1545    Specimen: Urine, Clean Catch Updated: 09/07/22 1559     Color, UA Yellow     Appearance, UA Clear     pH, UA 7.0     Specific [place], UA 1.004     Comment: Result obtained by Refractometer        Glucose, UA Negative     Ketones, UA Negative     Bilirubin, UA Negative     Blood, UA Small (1+)     Protein, UA Negative     Leuk Esterase, UA Negative     Nitrite, UA Negative     Urobilinogen, UA 0.2 E.U./dL    CBC & Differential [[id number]]  (Abnormal) Collected: 09/07/22 1544    Specimen: Blood Updated: 09/07/22 1552    Narrative:      The following orders were created for panel order CBC & Differential.  Procedure                               Abnormality         Status                     ---------                               -----------         ------                     CBC Auto Differential[[id number]]        Abnormal            Final result                 Please view results for these tests on the individual orders.    CBC Auto Differential [[id number]]  (Abnormal) Collected: 09/07/22 1544    Specimen: Blood Updated: 09/07/22 1552     WBC 7.75 10*3/mm3      RBC 4.98 10*6/mm3      Hemoglobin 13.7 g/dL      Hematocrit 38.9 %      MCV 78.1 fL      MCH 27.5 pg      MCHC 35.2 g/dL      RDW 13.2 %      RDW-SD 37.4 fl      MPV 9.7 fL      Platelets 174 10*3/mm3      Neutrophil % 69.6 %      Lymphocyte % 22.6 %      Monocyte % 6.1 %      Eosinophil % 0.5 %      Basophil % 0.4 %      Immature Grans % 0.8 %      Neutrophils, Absolute 5.40 10*3/mm3      Lymphocytes, Absolute 1.75 10*3/mm3      Monocytes, Absolute 0.47 10*3/mm3      Eosinophils, Absolute 0.04 10*3/mm3      Basophils, Absolute 0.03 10*3/mm3      Immature Grans, Absolute 0.06 10*3/mm3      nRBC 0.0 /100 WBC         Imaging Results (Last 24 Hours)     Procedure Component Value Units Date/Time    CT Angiogram Chest [[id number]] Collected: 09/07/22 1750     Updated: 09/07/22 1811     Narrative:      EXAM:  CT CHEST ANGIOGRAPHY WITH IV CONTRAST    ORDERING PROVIDER:  BOB RICH    CLINICAL HISTORY:  Shortness of breath    COMPARISON:      TECHNIQUE:   Chest CT was performed using a high resolution pulmonary  angiogram protocol with 69ml of Isovue-370 contrast and  reformatted in the sagittal and coronal planes.     3-dimensional images also acquired with special processing of the  CT scan data with a specialized workstation for evaluation.    This examination was performed according to our departmental dose  optimization program which includes automated exposure control,  adjustment of the MA and kV according to patient size, and/or use  of iterative reconstruction technique.     FINDINGS:     LUNGS AND PLEURA: Mild consolidation in the bilateral posterior  lower lobes, which may be due to atelectasis versus infiltrate.  No significant pleural effusion. Heterogeneous appearance of the  lung parenchyma due to sequelae of small airway disease, such as  asthma, among other etiologies.    HEART: Prominence in size and unremarkable configuration. No  pericardial effusion.     MEDIASTINUM AND BONITA: No significant adenopathy.     AORTA AND GREAT VESSELS: No aneurysm or dissection.     PULMONARY ARTERIES: No filling defects in the central portion.  Assessment of branches distal to the segmental branches are  limited due to suboptimal contrast.    UPPER ABDOMEN: Diffuse fatty change of liver with splenomegaly..    MUSCULOSKELETAL:  No aggressive osseous lesion. Normal vertebral  body height and alignment. No lytic or sclerotic lesion.     EXTRATHORACIC SOFT TISSUES: No axillary adenopathy. No mass.  Unremarkable supraclavicular soft tissues.       Impression:      No evidence of central pulmonary embolism.  Assessment of branches of pulmonary artery distal to the  segmental branches are limited due to suboptimal contrast.  Mild consolidation in the bilateral posterior lower lobes, which  may be  due to atelectasis versus infiltrate.   Heterogeneous appearance of the lung parenchyma due to sequelae  of small airway disease, such as asthma, among other etiologies.  Diffuse fatty change of visualized liver with splenomegaly..    Electronically signed by:  Jey Main MD  9/7/2022 6:09 PM CDT  Workstation: [id number]    US Venous Doppler Lower Extremity Bilateral (duplex) [[id number]] Collected: 09/07/22 1618     Updated: 09/07/22 1657    Narrative:      EXAM: US LOWER EXTREMITY VEINS BILATERAL    HISTORY: bilat lower ext edema, left greater than right    COMPARISON STUDY:      TECHNIQUE:  Grayscale, duplex and color Doppler sonogram of the bilateral  lower extremities was obtained.     FINDINGS:    There is complete coaptation with graded compression at all  visualized levels from the common femoral vein to the calf veins.  There is augmentation of the venous waveform with calf  compression and respiratory variation seen at appropriate levels.   No filling defect is seen.   No Baker's cyst seen.     Unremarkable bilateral greater saphenous veins.      Impression:      IMPRESSION  1.  No evidence of deep venous thrombosis of the bilateral lower  extremities.    Electronically signed by:  Jey Main MD  9/7/2022 4:54 PM CDT  Workstation: [id number]    XR Chest 1 View [[id number]] Collected: 09/07/22 1440     Updated: 09/07/22 1503    Narrative:      EXAM: XR CHEST 1 VIEW    HISTORY: shortness of breath    COMPARISON: 4/28/2021    TECHNIQUE:   Portable view of the chest.    FINDINGS:   Mild to moderate pulmonary vascular congestion increased since  prior. There is cardiomegaly.  The lungs are otherwise well aerated. There is no significant  pleural effusion. The mediastinal contours are within normal  limits. .  No evidence of mediastinal shift or pneumothorax.  Unremarkable visualized osseous structures.      Impression:      1.  Mild to moderate pulmonary vascular congestion increased  since prior.   2.  There is  cardiomegaly.          Electronically signed by:  Jey Main MD  9/7/2022 3:00 PM CDT  Workstation: [id number]            Assessment:      # Shortness of breath, dyspnea  # Fluid overload, mild  # Pneumonia, CAP cannot be excluded  # Uncontrolled hypertension  # Hypokalemia  # Hyperlipidemia  # Chronic narcotic use, chronic back pain  # Morbid  obesity with BMI of 48        Plan:    Place on telemetry  Obtain further laboratory work-up  Obtain serial EKG and cardiac enzyme for reassurance  Obtain echocardiogram  Her proBNP is not elevated but her morbid obesity may mask her BNP level, echo ordered.  Patient received dose of Lasix in ED. placed on Lasix 40 mg daily for immediate care  Place on antibiotic pending further evaluation concerning potential pneumonia considering CTA chest results  NIPPV, supplemental O2 as needed  Replace potassium and place on electrolyte replacement protocol  Continue home medication valsartan for blood pressure.  Lasix as above.  Placed on labetalol IV and hydralazine IV as needed for correction of significantly elevated blood pressures.  Comorbidities will be treated appropriately  Reconcile home medication and continue with essential home medication  Medically supervised weight reduction outpatient is strongly recommended, as evaluation outpatient for obstructive sleep apnea.  DVT and GI prophylaxis will be initiated  Please see orders for comprehensive plan    I confirmed that the patient's Advance Care Plan is present, code status is documented, or surrogate decision maker is listed in the patient's medical record.     I have utilized all available immediate resources to obtain, update, or review the patient's current medications.     I discussed the patient's findings and my recommendations with: Patient and she agreed with above plan of care.      Saeid Behroozi, MD   09/07/22   20:38 CDT                Electronically signed by Behroozi, Saeid, MD at 09/08/22 0126          Emergency  Department Notes      Rudi Thurston at 09/07/22 1423        Pt presents to ED with C/O Leg swelling, Facial swelling, Fluid retention and SOA that has been persistent for a few weeks now.     Electronically signed by Rudi Thurston at 09/07/22 1424     Rudi Thurston at 09/07/22 1430        EKG completed in triage at 1427. EKG Signed by MD Zamora.     Electronically signed by Rudi Thurston at 09/07/22 1439     Alta Zamora MD at 09/07/22 1559          Subjective   PIT    Patient presents with retaining fluid for 1 to 2 weeks and shortness of breath for the last 3 days.  She endorses her left leg is swelling more than her right leg and she normally does not have any swelling at all.  She feels like her abdomen is distended.  Her last bowel movement was 3 hours ago and was normal.  She denies dysuria, fever or chills, cough, or nausea vomiting.          Review of Systems   Constitutional: Negative for activity change and appetite change.   HENT: Negative for congestion and ear pain.    Eyes: Negative for pain and discharge.   Respiratory: Positive for chest tightness (chest pressure), shortness of breath and wheezing.    Cardiovascular: Positive for leg swelling. Negative for chest pain and palpitations.   Gastrointestinal: Negative for abdominal distention and abdominal pain.   Endocrine: Negative for cold intolerance and heat intolerance.   Genitourinary: Negative for difficulty urinating and dysuria.   Musculoskeletal: Negative for arthralgias and back pain.   Skin: Negative for color change and rash.   Allergic/Immunologic: Negative for environmental allergies and food allergies.   Neurological: Negative for dizziness and headaches.   Hematological: Negative for adenopathy. Does not bruise/bleed easily.   Psychiatric/Behavioral: Negative for agitation and confusion. The patient is nervous/anxious.        Past Medical History:   Diagnosis Date   • Anxiety    • Arthritis    • Arthropathy of lumbar  facet joint    • Callus    • Chronic depression    • Degeneration of lumbar intervertebral disc    • Drug therapy     Other long term (current) drug therapy      • Epilepsy (HCC)    • Foot pain, left    • Headache    • Hearing loss    • Heart problem    • High blood pressure    • Hypercholesterolemia    • Hypertensive disorder    • Obesity    • Osteoporosis    • Osteoporosis    • Pancreatitis    • Seizures (HCC)    • Varicose veins of lower extremity    • Wears glasses        Allergies   Allergen Reactions   • Amoxicillin-Pot Clavulanate Hives   • Clarithromycin Hives   • Ibuprofen Hives and Nausea And Vomiting   • Naproxen Hives and Nausea And Vomiting   • Nsaids Hives   • Other Other (See Comments)     Hx of MRSA       Past Surgical History:   Procedure Laterality Date   • APPENDECTOMY     • CARDIAC CATHETERIZATION  06/05/2013    Cardiac cath 25066 (1)      • CHOLECYSTECTOMY     • INCISION AND DRAINAGE LEG Left 1/29/2021    Procedure: Left foot incision and drainage, tibial sesamoidectomy            (MIN C-ARM);  Surgeon: Gamaliel Starks DPM;  Location: [facility];  Service: Podiatry;  Laterality: Left;   • INCISION AND DRAINAGE LEG Left 3/4/2021    Procedure: Left foot incision and drainage, bone biopsy;  Surgeon: Gamaliel Starks DPM;  Location: [facility];  Service: Podiatry;  Laterality: Left;   • INJECTION OF MEDICATION  03/28/2016    Injection for nerve block (2)      • OTHER SURGICAL HISTORY  05/05/2016    Incise spinal column/nerves (1)          Family History   Problem Relation Age of Onset   • Asthma Mother    • Cancer Mother         other   • Diabetes Mother    • Heart disease Mother    • Hyperlipidemia Mother    • Migraines Mother    • Osteoporosis Mother    • Cancer Father         other   • Hyperlipidemia Father    • Diabetes Maternal Grandmother    • Cancer Maternal Grandmother    • Heart disease Maternal Grandfather    • Mental illness Other         Mental Disorder   • Cancer Maternal Uncle         Social History     Socioeconomic History   • Marital status:    Tobacco Use   • Smoking status: Former Smoker   • Smokeless tobacco: Never Used   Vaping Use   • Vaping Use: Never used   Substance and Sexual Activity   • Alcohol use: No   • Drug use: No   • Sexual activity: Defer           Objective   Physical Exam  Vitals and nursing note reviewed.   Constitutional:       Appearance: She is well-developed. She is obese.   HENT:      Head: Normocephalic and atraumatic.   Eyes:      Pupils: Pupils are equal, round, and reactive to light.   Neck:      Thyroid: No thyromegaly.      Vascular: No JVD.      Trachea: No tracheal deviation.   Cardiovascular:      Rate and Rhythm: Normal rate and regular rhythm.      Pulses:           Radial pulses are 2+ on the right side and 2+ on the left side.        Dorsalis pedis pulses are 2+ on the right side and 2+ on the left side.      Heart sounds: S1 normal and S2 normal. Murmur heard.   Pulmonary:      Effort: Pulmonary effort is normal.      Breath sounds: Wheezing (scant throughout) present.   Abdominal:      General: Bowel sounds are normal.      Palpations: Abdomen is soft.   Musculoskeletal:         General: Normal range of motion.      Right lower leg: Edema (trace pitting) present.      Left lower leg: Edema (1+ pitting) present.   Skin:     General: Skin is warm and dry.      Capillary Refill: Capillary refill takes 2 to 3 seconds.   Neurological:      Mental Status: She is alert and oriented to person, place, and time.      GCS: GCS eye subscore is 4. GCS verbal subscore is 5. GCS motor subscore is 6.   Psychiatric:         Speech: Speech normal.         Behavior: Behavior normal.         Thought Content: Thought content normal.         Procedures          ED Course      Vitals:    09/07/22 1929 09/07/22 1934 09/07/22 1937 09/07/22 1951   BP:    164/78   BP Location:       Patient Position:       Pulse: 82 82 82    Resp: 16   20   Temp:       TempSrc:        SpO2: 96% 100% 100%    Weight:       Height:         Lab Results (last 24 hours)     Procedure Component Value Units Date/Time    Troponin [[id number]]  (Normal) Collected: 09/07/22 1805    Specimen: Blood Updated: 09/07/22 1833     Troponin T <0.010 ng/mL     Narrative:      Troponin T Reference Range:  <= 0.03 ng/mL-   Negative for AMI  >0.03 ng/mL-     Abnormal for myocardial necrosis.  Clinicians would have to utilize clinical acumen, EKG, Troponin and serial changes to determine if it is an Acute Myocardial Infarction or myocardial injury due to an underlying chronic condition.       Results may be falsely decreased if patient taking Biotin.      Magnesium [[id number]]  (Normal) Collected: 09/07/22 1544    Specimen: Blood Updated: 09/07/22 1759     Magnesium 1.8 mg/dL     COVID-19 and FLU A/B PCR - Swab, Nasopharynx [[id number]]  (Normal) Collected: 09/07/22 1642    Specimen: Swab from Nasopharynx Updated: 09/07/22 1732     COVID19 Not Detected     Influenza A PCR Not Detected     Influenza B PCR Not Detected    Narrative:      Fact sheet for providers: https://www.fda.gov/media/500213/download    Fact sheet for patients: https://www.fda.gov/media/672960/download    Test performed by PCR.    D-dimer, Quantitative [[id number]]  (Abnormal) Collected: 09/07/22 1544    Specimen: Blood Updated: 09/07/22 1615     D-Dimer, Quantitative 830 ng/mL (FEU)     Narrative:      Dimer values <500 ng/ml FEU are FDA approved as aid in diagnosis of deep venous thrombosis and pulmonary embolism.  This test should not be used in an exclusion strategy with pretest probability alone.    A recent guideline regarding diagnosis for pulmonary thromboembolism recommends an adjusted exclusion criterion of age x 10 ng/ml FEU for patients >50 years of age ([employer] 2015; 163: 701-711).      Comprehensive Metabolic Panel [[id number]]  (Abnormal) Collected: 09/07/22 1544    Specimen: Blood Updated: 09/07/22 1609     Glucose 97 mg/dL       BUN 7 mg/dL      Creatinine 0.63 mg/dL      Sodium 141 mmol/L      Potassium 3.2 mmol/L      Chloride 103 mmol/L      CO2 29.0 mmol/L      Calcium 9.5 mg/dL      Total Protein 7.3 g/dL      Albumin 4.10 g/dL      ALT (SGPT) 12 U/L      AST (SGOT) 19 U/L      Alkaline Phosphatase 111 U/L      Total Bilirubin 0.6 mg/dL      Globulin 3.2 gm/dL      A/G Ratio 1.3 g/dL      BUN/Creatinine Ratio 11.1     Anion Gap 9.0 mmol/L      eGFR 103.6 mL/min/1.73      Comment: National Kidney Foundation and American Society of Nephrology (ASN) Task Force recommended calculation based on the Chronic Kidney Disease Epidemiology Collaboration (CKD-EPI) equation refit without adjustment for race.       Narrative:      GFR Normal >60  Chronic Kidney Disease <60  Kidney Failure <15      Troponin [[id number]]  (Normal) Collected: 09/07/22 1544    Specimen: Blood Updated: 09/07/22 1609     Troponin T <0.010 ng/mL     Narrative:      Troponin T Reference Range:  <= 0.03 ng/mL-   Negative for AMI  >0.03 ng/mL-     Abnormal for myocardial necrosis.  Clinicians would have to utilize clinical acumen, EKG, Troponin and serial changes to determine if it is an Acute Myocardial Infarction or myocardial injury due to an underlying chronic condition.       Results may be falsely decreased if patient taking Biotin.      BNP [[id number]]  (Normal) Collected: 09/07/22 1544    Specimen: Blood Updated: 09/07/22 1608     proBNP 586.0 pg/mL     Narrative:      Among patients with dyspnea, NT-proBNP is highly sensitive for the detection of acute congestive heart failure. In addition NT-proBNP of <300 pg/ml effectively rules out acute congestive heart failure with 99% negative predictive value.    Results may be falsely decreased if patient taking Biotin.      Urinalysis, Microscopic Only - Urine, Clean Catch [[id number]]  (Abnormal) Collected: 09/07/22 1545    Specimen: Urine, Clean Catch Updated: 09/07/22 1600     RBC, UA 0-2 /HPF      WBC, UA 0-2 /HPF       Bacteria, UA None Seen /HPF      Squamous Epithelial Cells, UA 0-2 /HPF      Hyaline Casts, UA None Seen /LPF      Methodology Automated Microscopy    Urinalysis With Microscopic If Indicated (No Culture) - Urine, Clean Catch [[id number]]  (Abnormal) Collected: 09/07/22 1545    Specimen: Urine, Clean Catch Updated: 09/07/22 1559     Color, UA Yellow     Appearance, UA Clear     pH, UA 7.0     Specific [place], UA 1.004     Comment: Result obtained by Refractometer        Glucose, UA Negative     Ketones, UA Negative     Bilirubin, UA Negative     Blood, UA Small (1+)     Protein, UA Negative     Leuk Esterase, UA Negative     Nitrite, UA Negative     Urobilinogen, UA 0.2 E.U./dL    CBC & Differential [[id number]]  (Abnormal) Collected: 09/07/22 1544    Specimen: Blood Updated: 09/07/22 1552    Narrative:      The following orders were created for panel order CBC & Differential.  Procedure                               Abnormality         Status                     ---------                               -----------         ------                     CBC Auto Differential[[id number]]        Abnormal            Final result                 Please view results for these tests on the individual orders.    CBC Auto Differential [[id number]]  (Abnormal) Collected: 09/07/22 1544    Specimen: Blood Updated: 09/07/22 1552     WBC 7.75 10*3/mm3      RBC 4.98 10*6/mm3      Hemoglobin 13.7 g/dL      Hematocrit 38.9 %      MCV 78.1 fL      MCH 27.5 pg      MCHC 35.2 g/dL      RDW 13.2 %      RDW-SD 37.4 fl      MPV 9.7 fL      Platelets 174 10*3/mm3      Neutrophil % 69.6 %      Lymphocyte % 22.6 %      Monocyte % 6.1 %      Eosinophil % 0.5 %      Basophil % 0.4 %      Immature Grans % 0.8 %      Neutrophils, Absolute 5.40 10*3/mm3      Lymphocytes, Absolute 1.75 10*3/mm3      Monocytes, Absolute 0.47 10*3/mm3      Eosinophils, Absolute 0.04 10*3/mm3      Basophils, Absolute 0.03 10*3/mm3      Immature Grans, Absolute 0.06 10*3/mm3       nRBC 0.0 /100 WBC         XR Chest 1 View    Result Date: 9/7/2022  1.  Mild to moderate pulmonary vascular congestion increased since prior. 2.  There is cardiomegaly. Electronically signed by:  Jey Main MD  9/7/2022 3:00 PM CDT Workstation: [id number]    US Venous Doppler Lower Extremity Bilateral (duplex)    Result Date: 9/7/2022  IMPRESSION 1.  No evidence of deep venous thrombosis of the bilateral lower extremities. Electronically signed by:  Jey Main MD  9/7/2022 4:54 PM CDT Workstation: [id number]    CT Angiogram Chest    Result Date: 9/7/2022  No evidence of central pulmonary embolism. Assessment of branches of pulmonary artery distal to the segmental branches are limited due to suboptimal contrast. Mild consolidation in the bilateral posterior lower lobes, which may be due to atelectasis versus infiltrate. Heterogeneous appearance of the lung parenchyma due to sequelae of small airway disease, such as asthma, among other etiologies. Diffuse fatty change of visualized liver with splenomegaly.. Electronically signed by:  Jey Main MD  9/7/2022 6:09 PM CDT Workstation: [id number]                                         MDM  Number of Diagnoses or Management Options  Congestive heart failure, unspecified HF chronicity, unspecified heart failure type (HCC)  Dyspnea on exertion  Diagnosis management comments: Patient with CT findings of pulmonary vascular congestion versus infiltrate.  White count normal and patient afebrile.  Lasix given with good diuresis.  Labs otherwise unremarkable with the exception of elevated D-dimer.  CTA of the chest negative for PE.  Patient able to maintain her sats while ambulating on room air greater than 94% but became severely dyspneic with tachycardia and tachypnea.  Discussed case with hospitalist who accepts patient for ops.       Amount and/or Complexity of Data Reviewed  Clinical lab tests: ordered and reviewed  Tests in the radiology section of CPT®: ordered and  reviewed    Patient Progress  Patient progress: stable      Final diagnoses:   Congestive heart failure, unspecified HF chronicity, unspecified heart failure type (HCC)   Dyspnea on exertion       ED Disposition  ED Disposition     ED Disposition   Decision to Admit    Condition   --    Comment   Level of Care: Telemetry [5]   Diagnosis: Congestive heart failure, unspecified HF chronicity, unspecified heart failure type (HCC) [[id number]]   Admitting Physician: BEHROOZI, SAEID [[id number]]   Attending Physician: BEHROOZI, SAEID [[id number]]               No follow-up provider specified.       Medication List      No changes were made to your prescriptions during this visit.       This document has been electronically signed by Alta Zamora MD on September 7, 2022 20:33 CDT    Alta Zamora MD   Part of this note may be an electronic transcription/translation of spoken language to printed text using the Dragon Dictation System.        Alta Zamora MD  09/07/22 2033      Electronically signed by Alta Zamora MD at 09/07/22 2033     Kindra Shaw RN at 09/07/22 1838        RT called for neb tx    Electronically signed by Kindra Shaw, GUERO at 09/07/22 1925     Kingston Caba at 09/07/22 2018        Pt ambulated for minute and half. Pt very short of air throughout duration. O2 was 97% entire time with hr being 129. Pt attempted to ambulate once more but only lasted 30 seconds, returning to her seat.    Electronically signed by Kingston Caba at 09/07/22 2022     Kindra Shaw RN at 09/07/22 2103        Attempted to give report, used .phrase     Electronically signed by Kindra Shaw RN at 09/07/22 2149     Kindra Shaw RN at 09/07/22 2105          Nursing report ED to floor  Haven Mahoney  56 y.o.  female    HPI:   Chief Complaint   Patient presents with   • Shortness of Breath     Swelling          Admitting doctor:   Saeid Behroozi, MD    Consulting provider(s):  Consults     No orders found from  8/9/2022 to 9/8/2022.           Admitting diagnosis:   The primary encounter diagnosis was Congestive heart failure, unspecified HF chronicity, unspecified heart failure type (HCC). A diagnosis of Dyspnea on exertion was also pertinent to this visit.    Code status:   Current Code Status     Date Active Code Status Order ID Comments User Context       9/7/2022 2047 CPR (Attempt to Resuscitate) [id number]  Behroozi, Saeid, MD ED     Advance Care Planning Activity      Questions for Current Code Status     Question Answer    Code Status (Patient has no pulse and is not breathing) CPR (Attempt to Resuscitate)    Medical Interventions (Patient has pulse or is breathing) Full Support          Allergies:   Amoxicillin-pot clavulanate, Clarithromycin, Ibuprofen, Naproxen, Nsaids, and Other    Intake and Output  No intake or output data in the 24 hours ending 09/07/22 2105    Weight:       09/07/22  1424   Weight: 117 kg (258 lb 4.8 oz)       Most recent vitals:   Vitals:    09/07/22 1937 09/07/22 1951 09/07/22 2045 09/07/22 2105   BP:  164/78 163/77    BP Location:       Patient Position:       Pulse: 82  94    Resp:  20  20   Temp:       TempSrc:       SpO2: 100%  96%    Weight:       Height:           Active LDAs/IV Access:   Lines, Drains & Airways     Active LDAs     Name Placement date Placement time Site Days    Peripheral IV 09/07/22 1553 Right Forearm 09/07/22  1553  Forearm  less than 1                Labs (abnormal labs have a star):   Labs Reviewed   COMPREHENSIVE METABOLIC PANEL - Abnormal; Notable for the following components:       Result Value    Potassium 3.2 (*)     All other components within normal limits    Narrative:     GFR Normal >60  Chronic Kidney Disease <60  Kidney Failure <15     D-DIMER, QUANTITATIVE - Abnormal; Notable for the following components:    D-Dimer, Quantitative 830 (*)     All other components within normal limits    Narrative:     Dimer values <500 ng/ml FEU are FDA approved as aid  in diagnosis of deep venous thrombosis and pulmonary embolism.  This test should not be used in an exclusion strategy with pretest probability alone.    A recent guideline regarding diagnosis for pulmonary thromboembolism recommends an adjusted exclusion criterion of age x 10 ng/ml FEU for patients >50 years of age ([employer] Med 2015; 163: 701-711).     URINALYSIS W/ MICROSCOPIC IF INDICATED (NO CULTURE) - Abnormal; Notable for the following components:    Blood, UA Small (1+) (*)     All other components within normal limits   CBC WITH AUTO DIFFERENTIAL - Abnormal; Notable for the following components:    MCV 78.1 (*)     Immature Grans % 0.8 (*)     Immature Grans, Absolute 0.06 (*)     All other components within normal limits   URINALYSIS, MICROSCOPIC ONLY - Abnormal; Notable for the following components:    RBC, UA 0-2 (*)     All other components within normal limits   COVID-19 AND FLU A/B, NP SWAB IN TRANSPORT MEDIA 8-12 HR TAT - Normal    Narrative:     Fact sheet for providers: https://www.fda.gov/media/157510/download    Fact sheet for patients: https://www.fda.gov/media/791606/download    Test performed by PCR.   TROPONIN (IN-HOUSE) - Normal    Narrative:     Troponin T Reference Range:  <= 0.03 ng/mL-   Negative for AMI  >0.03 ng/mL-     Abnormal for myocardial necrosis.  Clinicians would have to utilize clinical acumen, EKG, Troponin and serial changes to determine if it is an Acute Myocardial Infarction or myocardial injury due to an underlying chronic condition.       Results may be falsely decreased if patient taking Biotin.     TROPONIN (IN-HOUSE) - Normal    Narrative:     Troponin T Reference Range:  <= 0.03 ng/mL-   Negative for AMI  >0.03 ng/mL-     Abnormal for myocardial necrosis.  Clinicians would have to utilize clinical acumen, EKG, Troponin and serial changes to determine if it is an Acute Myocardial Infarction or myocardial injury due to an underlying chronic condition.       Results  may be falsely decreased if patient taking Biotin.     BNP (IN-HOUSE) - Normal    Narrative:     Among patients with dyspnea, NT-proBNP is highly sensitive for the detection of acute congestive heart failure. In addition NT-proBNP of <300 pg/ml effectively rules out acute congestive heart failure with 99% negative predictive value.    Results may be falsely decreased if patient taking Biotin.     MAGNESIUM - Normal   BLOOD CULTURE   BLOOD CULTURE   RESPIRATORY CULTURE   RESPIRATORY PANEL PCR W/ COVID-19 (SARS-COV-2) DON/VANIA/VENICE/PAD/COR/MAD/LISSY IN-HOUSE, NP SWAB IN [facility], 3-4 HR TAT   LACTIC ACID, PLASMA   CBC AND DIFFERENTIAL    Narrative:     The following orders were created for panel order CBC & Differential.  Procedure                               Abnormality         Status                     ---------                               -----------         ------                     CBC Auto Differential[[id number]]        Abnormal            Final result                 Please view results for these tests on the individual orders.   EXTRA TUBES    Narrative:     The following orders were created for panel order Extra Tubes.  Procedure                               Abnormality         Status                     ---------                               -----------         ------                     Gold Top - SST[[id number]]                                                                Please view results for these tests on the individual orders.   GOLD TOP - SST       Meds given in ED:   Medications   sodium chloride 0.9 % flush 10 mL (has no administration in time range)   ipratropium-albuterol (DUO-NEB) nebulizer solution 3 mL ( Nebulization Canceled Entry 9/7/22 1930)   fluticasone (FLONASE) 50 MCG/ACT nasal spray 1 spray (has no administration in time range)   gabapentin (NEURONTIN) capsule 300 mg (has no administration in time range)   HYDROcodone-acetaminophen (NORCO) 7.5-325 MG per tablet 1 tablet (has no  administration in time range)   cetirizine (zyrTEC) tablet 10 mg (has no administration in time range)   rosuvastatin (CRESTOR) tablet 5 mg (has no administration in time range)   valsartan (DIOVAN) tablet 160 mg (has no administration in time range)   sodium chloride 0.9 % flush 10 mL (has no administration in time range)   sodium chloride 0.9 % flush 10 mL (has no administration in time range)   Enoxaparin Sodium (LOVENOX) syringe 40 mg (has no administration in time range)   potassium chloride (MICRO-K) CR capsule 40 mEq (has no administration in time range)   cefTRIAXone (ROCEPHIN) 1 g/100 mL 0.9% NS (MBP) (has no administration in time range)   doxycycline (VIBRAMYCIN) 100 mg/100 mL 0.9% NS MBP (has no administration in time range)   potassium chloride (MICRO-K) CR capsule 40 mEq (has no administration in time range)     Or   potassium chloride (KLOR-CON) packet 40 mEq (has no administration in time range)     Or   potassium chloride 10 mEq in 100 mL IVPB (has no administration in time range)   furosemide (LASIX) injection 40 mg (has no administration in time range)   HYDROcodone-acetaminophen (NORCO) 7.5-325 MG per tablet 1 tablet (1 tablet Oral Given 9/7/22 1642)   iopamidol (ISOVUE-370) 76 % injection 100 mL (69 mL Intravenous Given 9/7/22 1755)   potassium chloride (MICRO-K) CR capsule 40 mEq (40 mEq Oral Given 9/7/22 1857)   furosemide (LASIX) injection 40 mg (40 mg Intravenous Given 9/7/22 1857)   valsartan (DIOVAN) tablet 320 mg (320 mg Oral Given 9/7/22 1911)   HYDROmorphone (DILAUDID) injection 0.5 mg (0.5 mg Intravenous Given 9/7/22 1911)           NIH Stroke Scale:       Isolation/Infection(s):  No active isolations   COVID (rule out)     COVID Testing  Collected -yes 9/7/22  Resulted - yes, negative     Nursing report ED to floor:  Mental status: A&Ox4  Ambulatory status: ad katherine  Precautions: NA    ED nurse phone [phone]- [phone]    Electronically signed by Kindra Shaw RN at 09/07/22 8451       Lab  Results (last 48 hours)     Procedure Component Value Units Date/Time    Extra Tubes [[id number]] Collected: 09/11/22 0622    Specimen: Blood, Venous Line Updated: 09/11/22 0732    Narrative:      The following orders were created for panel order Extra Tubes.  Procedure                               Abnormality         Status                     ---------                               -----------         ------                     Lavender Top[[id number]]                                     Final result                 Please view results for these tests on the individual orders.    Lavender Top [[id number]] Collected: 09/11/22 0622    Specimen: Blood Updated: 09/11/22 0732     Extra Tube hold for add-on     Comment: Auto resulted       Basic Metabolic Panel [[id number]]  (Abnormal) Collected: 09/11/22 0528    Specimen: Blood Updated: 09/11/22 0639     Glucose 112 mg/dL      BUN 21 mg/dL      Creatinine 0.80 mg/dL      Sodium 137 mmol/L      Potassium 3.8 mmol/L      Chloride 98 mmol/L      CO2 28.0 mmol/L      Calcium 9.6 mg/dL      BUN/Creatinine Ratio 26.3     Anion Gap 11.0 mmol/L      eGFR 86.1 mL/min/1.73      Comment: National Kidney Foundation and American Society of Nephrology (ASN) Task Force recommended calculation based on the Chronic Kidney Disease Epidemiology Collaboration (CKD-EPI) equation refit without adjustment for race.       Narrative:      GFR Normal >60  Chronic Kidney Disease <60  Kidney Failure <15      Extra Tubes [[id number]] Collected: 09/10/22 0534    Specimen: Blood, Venous Line Updated: 09/10/22 0848    Narrative:      The following orders were created for panel order Extra Tubes.  Procedure                               Abnormality         Status                     ---------                               -----------         ------                     Lavender Top[[id number]]                                     Final result                 Please view results for these tests on the  individual orders.    Tiara Leiva [[id number]] Collected: 09/10/22 0534    Specimen: Blood Updated: 09/10/22 0848     Extra Tube hold for add-on     Comment: Auto resulted       Basic Metabolic Panel [[id number]]  (Abnormal) Collected: 09/10/22 0534    Specimen: Blood Updated: 09/10/22 0636     Glucose 102 mg/dL      BUN 20 mg/dL      Creatinine 0.83 mg/dL      Sodium 137 mmol/L      Potassium 3.5 mmol/L      Chloride 96 mmol/L      CO2 31.0 mmol/L      Calcium 9.4 mg/dL      BUN/Creatinine Ratio 24.1     Anion Gap 10.0 mmol/L      eGFR 82.3 mL/min/1.73      Comment: National Kidney Foundation and American Society of Nephrology (ASN) Task Force recommended calculation based on the Chronic Kidney Disease Epidemiology Collaboration (CKD-EPI) equation refit without adjustment for race.       Narrative:      GFR Normal >60  Chronic Kidney Disease <60  Kidney Failure <15            Imaging Results (Last 48 Hours)     ** No results found for the last 48 hours. **        ECG/EMG Results (last 48 hours)     Procedure Component Value Units Date/Time    SCANNED - TELEMETRY   [[id number]] Resulted: 09/07/22     Updated: 09/09/22 1759    SCANNED - TELEMETRY   [[id number]] Resulted: 09/07/22     Updated: 09/09/22 2013    SCANNED - TELEMETRY   [[id number]] Resulted: 09/07/22     Updated: 09/09/22 2013          Physician Progress Notes (last 48 hours)  Notes from 09/10/22 1142 through 09/12/22 1142   No notes of this type exist for this encounter.       Medical Student Notes (last 48 hours)  Notes from 09/10/22 1142 through 09/12/22 1142   No notes of this type exist for this encounter.       Consult Notes (last 48 hours)  Notes from 09/10/22 1142 through 09/12/22 1142   No notes of this type exist for this encounter.

## 2022-09-15 ENCOUNTER — READMISSION MANAGEMENT (OUTPATIENT)
Dept: CALL CENTER | Facility: HOSPITAL | Age: 57
End: 2022-09-15

## 2022-09-15 NOTE — OUTREACH NOTE
CHF Week 1 Survey    Flowsheet Row Responses   [facility] patient discharged from? [place]   Does the patient have one of the following disease processes/diagnoses(primary or secondary)? CHF   CHF Week 1 attempt successful? Yes   Call start time 1649   Call end time 1650   Discharge diagnosis Acute decompensated heart failure    Person spoke with today (if not patient) and relationship patient   Meds reviewed with patient/caregiver? Yes   Is the patient having any side effects they believe may be caused by any medication additions or changes? No   Does the patient have all medications ordered at discharge? Yes   Is the patient taking all medications as directed (includes completed medication regime)? Yes   Does the patient have a primary care provider?  Yes   Does the patient have an appointment with their PCP within 7 days of discharge? Greater than 7 days   Comments regarding PCP Dr. Metzger on 9/20 and cards   What is preventing the patient from scheduling follow up appointments within 7 days of discharge? Earlier appointment not available   Nursing Interventions Verified appointment date/time/provider   Has the patient kept scheduled appointments due by today? N/A   Psychosocial issues? No   Did the patient receive a copy of their discharge instructions? Yes   Nursing interventions Reviewed instructions with patient   What is the patient's perception of their health status since discharge? Improving   Nursing interventions Nurse provided patient education   Is the patient able to teach back signs and symptoms of worsening condition? (i.e. weight gain, shortness of air, etc.) Yes   If the patient is a current smoker, are they able to teach back resources for cessation? Not a smoker   Is the patient/caregiver able to teach back the hierarchy of who to call/visit for symptoms/problems? PCP, Specialist, Home health nurse, Urgent Care, ED, 911 Yes   Is the patient able to teach back Heart Failure Zones? Yes    CHF Nursing Interventions Education provided on various zones   CHF Zone this Call Green Zone   Green Zone Patient reports doing well, No new or worsening shortness of breath, Weight check stable   Green Zone Interventions Daily weight check, Meds as directed, Low sodium diet, Follow up visits planned    CHF Week 1 call completed? Yes   Call end time 1650          OSMAN TOMLIN - Registered Nurse

## 2022-09-20 ENCOUNTER — OFFICE VISIT (OUTPATIENT)
Dept: CARDIOLOGY | Facility: CLINIC | Age: 57
End: 2022-09-20

## 2022-09-20 VITALS
OXYGEN SATURATION: 98 % | BODY MASS INDEX: 47.84 KG/M2 | HEART RATE: 82 BPM | HEIGHT: 60 IN | WEIGHT: 243.7 LBS | SYSTOLIC BLOOD PRESSURE: 124 MMHG | DIASTOLIC BLOOD PRESSURE: 82 MMHG

## 2022-09-20 DIAGNOSIS — I50.33 ACUTE ON CHRONIC HEART FAILURE WITH PRESERVED EJECTION FRACTION: Primary | ICD-10-CM

## 2022-09-20 DIAGNOSIS — I10 PRIMARY HYPERTENSION: ICD-10-CM

## 2022-09-20 DIAGNOSIS — E78.00 PURE HYPERCHOLESTEROLEMIA: ICD-10-CM

## 2022-09-20 DIAGNOSIS — E66.01 CLASS 3 SEVERE OBESITY DUE TO EXCESS CALORIES WITH SERIOUS COMORBIDITY AND BODY MASS INDEX (BMI) OF 45.0 TO 49.9 IN ADULT: ICD-10-CM

## 2022-09-20 DIAGNOSIS — I35.0 NONRHEUMATIC AORTIC VALVE STENOSIS: ICD-10-CM

## 2022-09-20 LAB
QT INTERVAL: 394 MS
QT INTERVAL: 420 MS
QTC INTERVAL: 479 MS
QTC INTERVAL: 499 MS

## 2022-09-20 PROCEDURE — 99215 OFFICE O/P EST HI 40 MIN: CPT | Performed by: NURSE PRACTITIONER

## 2022-09-20 RX ORDER — METOPROLOL SUCCINATE 25 MG/1
25 TABLET, EXTENDED RELEASE ORAL DAILY
Qty: 90 TABLET | Refills: 0 | Status: SHIPPED | OUTPATIENT
Start: 2022-09-20 | End: 2022-10-20 | Stop reason: SDUPTHER

## 2022-09-20 RX ORDER — VALSARTAN 320 MG/1
320 TABLET ORAL DAILY
Qty: 90 TABLET | Refills: 0 | Status: SHIPPED | OUTPATIENT
Start: 2022-09-20 | End: 2022-10-20 | Stop reason: SDUPTHER

## 2022-09-20 RX ORDER — FUROSEMIDE 40 MG/1
20 TABLET ORAL DAILY
Qty: 90 TABLET | Refills: 0 | Status: SHIPPED | OUTPATIENT
Start: 2022-09-20 | End: 2022-10-20 | Stop reason: SDUPTHER

## 2022-09-20 NOTE — PROGRESS NOTES
[facility] CHF CLINIC OFFICE VISIT    Subjective:     Congestive Heart Failure      History of Present Illness      PCP: Dr. Lin      Hospitalizations: 9/7-9/11/22 CHF    Mrs. Haven Mahoney is a 56-year-old  female with medical history of HTN (chronic, controlled), HLD (chronic, controlled), Seizures, Pancreatitis, RLS, TIA, Chronic back pain, current right rotator cuff repair-- following with Dr. Ankit Mccarthy. She presents here today to establish with the CHF clinic recent hospitalization for decompensated heart failure per review of records.  Patient reported orthopnea, abdominal distention lower extremity edema, shortness of breath with exertion over 2 to 3 weeks, worsening over 2 to 3 days prior to presentation.  Echocardiogram was completed showing mild reduction in LVEF at 51 to 55%, grade 1 diastolic dysfunction. Pulmonary edema noted on CXR, ProBNP normal.   Patient was diuresed with IV Lasix with 15 pounds fluid removal.  He was also given short course of doxycycline for possible pneumonia.     She was discharged on oral Lasix.  Patient reports doing well and back to baseline.  She denies any edema, shortness of breath, PND, orthopnea, dizziness, syncope, palpitations or chest pain. She previously followed with Dr. Yu. In 2013,  she underwent cardiac catheterization which showed no significant epicardial coronary artery disease.  She does have mild aortic stenosis which is unchanged on most recent echocardiogram.       Past Medical History:   Diagnosis Date   • Anxiety    • Arthritis    • Arthropathy of lumbar facet joint    • Callus    • Chronic depression    • Degeneration of lumbar intervertebral disc    • Drug therapy     Other long term (current) drug therapy      • Epilepsy (HCC)    • Foot pain, left    • Headache    • Hearing loss    • Heart problem    • High blood pressure    • Hypercholesterolemia    • Hypertensive disorder    • Obesity    • Osteoporosis    • Osteoporosis    •  Pancreatitis    • Seizures (HCC)    • Varicose veins of lower extremity    • Wears glasses      Past Surgical History:   Procedure Laterality Date   • APPENDECTOMY     • CARDIAC CATHETERIZATION  06/05/2013    Cardiac cath 67329 (1)      • CHOLECYSTECTOMY     • INCISION AND DRAINAGE LEG Left 1/29/2021    Procedure: Left foot incision and drainage, tibial sesamoidectomy            (MIN C-ARM);  Surgeon: Gamaliel Starks DPM;  Location: [facility];  Service: Podiatry;  Laterality: Left;   • INCISION AND DRAINAGE LEG Left 3/4/2021    Procedure: Left foot incision and drainage, bone biopsy;  Surgeon: Gamaliel Starks DPM;  Location: [facility];  Service: Podiatry;  Laterality: Left;   • INJECTION OF MEDICATION  03/28/2016    Injection for nerve block (2)      • OTHER SURGICAL HISTORY  05/05/2016    Incise spinal column/nerves (1)        Social History     Socioeconomic History   • Marital status:    Tobacco Use   • Smoking status: Former Smoker   • Smokeless tobacco: Never Used   Vaping Use   • Vaping Use: Never used   Substance and Sexual Activity   • Alcohol use: No   • Drug use: No   • Sexual activity: Defer     Family History   Problem Relation Age of Onset   • Asthma Mother    • Cancer Mother         other   • Diabetes Mother    • Heart disease Mother    • Hyperlipidemia Mother    • Migraines Mother    • Osteoporosis Mother    • Cancer Father         other   • Hyperlipidemia Father    • Diabetes Maternal Grandmother    • Cancer Maternal Grandmother    • Heart disease Maternal Grandfather    • Mental illness Other         Mental Disorder   • Cancer Maternal Uncle        Allergies:  Allergies   Allergen Reactions   • Amoxicillin-Pot Clavulanate Hives   • Clarithromycin Hives   • Ibuprofen Hives and Nausea And Vomiting   • Naproxen Hives and Nausea And Vomiting   • Nsaids Hives   • Other Other (See Comments)     Hx of MRSA       Review of Systems   Constitutional: Negative for chills, fever, malaise/fatigue  and weight gain.   HENT: Negative for nosebleeds and tinnitus.    Eyes: Negative for blurred vision and double vision.   Cardiovascular: Negative for chest pain, dyspnea on exertion, irregular heartbeat, leg swelling, palpitations and syncope.   Respiratory: Negative for cough, shortness of breath, sleep disturbances due to breathing and snoring.    Endocrine: Negative for polydipsia, polyphagia and polyuria.   Hematologic/Lymphatic: Negative for bleeding problem. Does not bruise/bleed easily.   Skin: Negative for color change and suspicious lesions.   Musculoskeletal: Positive for arthritis and back pain. Negative for falls and myalgias.   Gastrointestinal: Negative for bloating, heartburn and hematochezia.   Genitourinary: Negative for dysuria and hematuria.   Neurological: Negative for dizziness, headaches, seizures, vertigo and weakness.   Psychiatric/Behavioral: Negative for altered mental status and depression. The patient does not have insomnia and is not nervous/anxious.    Allergic/Immunologic: Negative for environmental allergies and persistent infections.       Current Outpatient Medications   Medication Sig Dispense Refill   • fluticasone (FLONASE) 50 MCG/ACT nasal spray 1 spray into the nostril(s) as directed by provider As Needed.     • furosemide (LASIX) 40 MG tablet Take 0.5 tablets by mouth Daily. 90 tablet 0   • gabapentin (NEURONTIN) 300 MG capsule Take 600 mg by mouth Every Night.     • HYDROcodone-acetaminophen (NORCO) 7.5-325 MG per tablet Take 1 tablet by mouth 5 (Five) Times a Day As Needed.     • Loratadine 10 MG capsule Take 10 mg by mouth Daily.     • rosuvastatin (CRESTOR) 5 MG tablet Take 5 mg by mouth 3 (Three) Times a Week. Monday, Wednesday, Friday     • valsartan (DIOVAN) 320 MG tablet Take 1 tablet by mouth Daily. 90 tablet 0   • metoprolol succinate XL (TOPROL-XL) 25 MG 24 hr tablet Take 1 tablet by mouth Daily. 90 tablet 0     No current facility-administered medications for this  "visit.        Objective:     Vitals:    09/20/22 0751   BP: 124/82   BP Location: Left arm   Patient Position: Sitting   Cuff Size: Adult   Pulse: 82   SpO2: 98%   Weight: 111 kg (243 lb 11.2 oz)   Height: 152.4 cm (60\")       Wt Readings from Last 3 Encounters:   09/20/22 111 kg (243 lb 11.2 oz)   09/11/22 111 kg (245 lb 6.4 oz)   05/03/21 118 kg (260 lb 9.3 oz)            Vitals reviewed.   Constitutional:       General: Not in acute distress.     Appearance: Well-groomed. Morbidly obese. Not ill-appearing, toxic-appearing or diaphoretic.   Eyes:      General: Lids are normal.      Conjunctiva/sclera: Conjunctivae normal.   HENT:      Head: Normocephalic and atraumatic.      Right Ear: External ear normal.      Left Ear: External ear normal.   Neck:      Vascular: No JVD.   Pulmonary:      Effort: Pulmonary effort is normal. No respiratory distress.      Breath sounds: Normal breath sounds. No decreased breath sounds. No wheezing. No rales.   Chest:      Chest wall: Not tender to palpatation.   Cardiovascular:      PMI at left midclavicular line. Normal rate. Regular rhythm. Normal S1 with normal intensity. Normal S2 with normal intensity.      Murmurs: There is a grade 1/6 harsh midsystolic murmur at the URSB.      No gallop. No S3 and S4 gallop. No click. No rub.   Pulses:     Intact distal pulses. No decreased pulses.   Edema:     Peripheral edema absent.   Abdominal:      General: Bowel sounds are normal. There is no distension.      Palpations: Abdomen is soft.      Tenderness: There is no abdominal tenderness.   Musculoskeletal:      Cervical back: Normal range of motion and neck supple. Skin:     General: Skin is warm and dry.      Coloration: Skin is not pale.      Findings: No erythema or rash.   Neurological:      Mental Status: Alert and oriented to person, place, and time.      Gait: Gait normal.   Psychiatric:         Behavior: Behavior normal.         Thought Content: Thought content normal.         " Judgment: Judgment normal.         Cardiographics  Results for orders placed during the hospital encounter of 09/07/22    Adult Transthoracic Echo Complete W/ Cont if Necessary Per Protocol    Interpretation Summary  · Estimated right ventricular systolic pressure from tricuspid regurgitation is normal (<35 mmHg).  · Left ventricular wall thickness is consistent with mild concentric hypertrophy.  · Left ventricular ejection fraction appears to be 51 - 55%.  · Left ventricular diastolic function is consistent with (grade I) impaired relaxation.  · Mild aortic valve stenosis is present.      @[email or web address]    XR Chest 1 View    Result Date: 9/7/2022  1.  Mild to moderate pulmonary vascular congestion increased since prior. 2.  There is cardiomegaly. Electronically signed by:  Jey Main MD  9/7/2022 3:00 PM CDT Workstation: [id number]    US Venous Doppler Lower Extremity Bilateral (duplex)    Result Date: 9/7/2022  IMPRESSION 1.  No evidence of deep venous thrombosis of the bilateral lower extremities. Electronically signed by:  Jey Main MD  9/7/2022 4:54 PM CDT Workstation: [id number]    CT Angiogram Chest    Result Date: 9/7/2022  No evidence of central pulmonary embolism. Assessment of branches of pulmonary artery distal to the segmental branches are limited due to suboptimal contrast. Mild consolidation in the bilateral posterior lower lobes, which may be due to atelectasis versus infiltrate. Heterogeneous appearance of the lung parenchyma due to sequelae of small airway disease, such as asthma, among other etiologies. Diffuse fatty change of visualized liver with splenomegaly.. Electronically signed by:  Jey Main MD  9/7/2022 6:09 PM CDT Workstation: [id number]      EKG:          Lab Review     Lab Results   Component Value Date    TSH 1.450 09/08/2022     Lab Results   Component Value Date    GLUCOSE 112 (H) 09/11/2022    BUN 21 (H) 09/11/2022    CREATININE 0.80 09/11/2022    EGFRIFNONA 98 05/03/2021    EGFRIFAFRI  125 07/20/2021    BCR 26.3 (H) 09/11/2022    K 3.8 09/11/2022    CO2 28.0 09/11/2022    CALCIUM 9.6 09/11/2022    ALBUMIN 3.90 09/08/2022    AST 16 09/08/2022    ALT 9 09/08/2022     Lab Results   Component Value Date    WBC 9.02 09/09/2022    HGB 13.1 09/09/2022    HCT 40.7 09/09/2022    MCV 82.2 09/09/2022     09/09/2022       Lab Results   Component Value Date    CKTOTAL 15 (L) 05/03/2021    TROPONINT <0.010 09/08/2022     Lab Results   Component Value Date    PROBNP 586.0 09/07/2022            The following portions of the patient's history were reviewed and updated as appropriate: allergies, current medications, past family history, past medical history, past social history, past surgical history and problem list.     Old records reviewed and pertinent information is included in the above objective data.     Assessment/Plan:      Diagnosis Plan   1. Acute on chronic heart failure with preserved ejection fraction (HCC)  furosemide (LASIX) 40 MG tablet    valsartan (DIOVAN) 320 MG tablet   2. Primary hypertension     3. Pure hypercholesterolemia     4. Nonrheumatic aortic valve stenosis     5. Class 3 severe obesity due to excess calories with serious comorbidity and body mass index (BMI) of 45.0 to 49.9 in adult ([facility])        #1. Acute on Chronic HFpEF:  EF:low normal at 51-55%. NYHA Class I, Stage C. Patient appears euvolemic.  and in a well perfused physiologic state. Hemodynamics are acceptable  BETA-BLOCKER: Start Toprol XL 25mg  ACE/ARB: Valsartan 320mg   ENTRESTO: can consider  DIURETIC: Lasix decrease to 20mg  ALDOSTERONE ANTAGONIST: can consider  IMDUR/HYDRALAZINE: N/A  DIGOXIN: N/A  Fluid restriction: 2 L  Sodium restriction:2 grams  6MWT: next visit  ICD: QRS: 120ms    Presented an overview of heart failure, expected course, considerations, risk factors and exacerbation prevention.  Recommended daily weight monitoring. Information provided.  Recommended restricted dietary sodium intake of  2g/day  Fluid restrictions of 2L/day.  Discussed patient action plan for heart failure;  Recommended avoiding NSAIDs use.  Discussed warning signs requiring additional medical attention for heart failure.  Do not hesitate to contact this office for further symptoms or concerns. Contact information provided to the patient and understanding verbalized.     #2. HTN: chronic, controlled with current anti-hypertensives    #3: HLD: chronic continue statin.    #4. Mild AS: unchanged from previous study. Educated on s/s to call and report. He will remain in clinical surveillance.     Class 3 Severe Obesity (BMI >=40). Obesity-related health conditions include the following: hypertension, diabetes mellitus and dyslipidemias. Obesity is unchanged. BMI is is above average; BMI management plan is completed. We discussed portion control and increasing exercise.      Follow up: 2 weeks.     45 minutes out of 60 minutes face to face spent counseling patient extensively on dietary Na+ intake, importance of activity, weight monitoring, compliance with medications and follow up appointments.              This document has been electronically signed by MORENITA Leonardo on September 20, 2022 12:14 CDT

## 2022-09-23 ENCOUNTER — PATIENT ROUNDING (BHMG ONLY) (OUTPATIENT)
Dept: CARDIOLOGY | Facility: CLINIC | Age: 57
End: 2022-09-23

## 2022-09-23 NOTE — PROGRESS NOTES
September 23, 2022    Hello, may I speak with Haven Mahoney?    My name is ANJUM Gibbs      I am  with [facility] CARDIOLOG[facility]  [address] [place] [address] [phone].    Before we get started may I verify your date of birth? 1965    I am calling to officially welcome you to our practice and ask about your recent visit. Is this a good time to talk? yes    Tell me about your visit with us. What things went well? She was amazing. One of the best doctors I've ever seen. I would recommend her to anyone with cardiac issues.        We're always looking for ways to make our patients' experiences even better. Do you have recommendations on ways we may improve?  no    Overall were you satisfied with your first visit to our practice? yes       I appreciate you taking the time to speak with me today. Is there anything else I can do for you? no      Thank you, and have a great day.

## 2022-10-20 ENCOUNTER — OFFICE VISIT (OUTPATIENT)
Dept: CARDIOLOGY | Facility: CLINIC | Age: 57
End: 2022-10-20

## 2022-10-20 VITALS
SYSTOLIC BLOOD PRESSURE: 106 MMHG | WEIGHT: 244.1 LBS | HEIGHT: 60 IN | BODY MASS INDEX: 47.92 KG/M2 | DIASTOLIC BLOOD PRESSURE: 72 MMHG | HEART RATE: 76 BPM | OXYGEN SATURATION: 98 %

## 2022-10-20 DIAGNOSIS — Z01.810 PRE-OPERATIVE CARDIOVASCULAR EXAMINATION: ICD-10-CM

## 2022-10-20 DIAGNOSIS — E66.01 CLASS 3 SEVERE OBESITY DUE TO EXCESS CALORIES WITH SERIOUS COMORBIDITY AND BODY MASS INDEX (BMI) OF 45.0 TO 49.9 IN ADULT: ICD-10-CM

## 2022-10-20 DIAGNOSIS — E78.00 PURE HYPERCHOLESTEROLEMIA: ICD-10-CM

## 2022-10-20 DIAGNOSIS — I50.32 CHRONIC HEART FAILURE WITH PRESERVED EJECTION FRACTION: Primary | ICD-10-CM

## 2022-10-20 DIAGNOSIS — I35.0 NONRHEUMATIC AORTIC VALVE STENOSIS: ICD-10-CM

## 2022-10-20 DIAGNOSIS — I10 PRIMARY HYPERTENSION: ICD-10-CM

## 2022-10-20 PROCEDURE — 99214 OFFICE O/P EST MOD 30 MIN: CPT | Performed by: NURSE PRACTITIONER

## 2022-10-20 RX ORDER — METOPROLOL SUCCINATE 25 MG/1
25 TABLET, EXTENDED RELEASE ORAL DAILY
Qty: 90 TABLET | Refills: 1 | Status: SHIPPED | OUTPATIENT
Start: 2022-10-20

## 2022-10-20 RX ORDER — FUROSEMIDE 20 MG/1
20 TABLET ORAL DAILY PRN
Qty: 90 TABLET | Refills: 1 | Status: SHIPPED | OUTPATIENT
Start: 2022-10-20

## 2022-10-20 RX ORDER — VALSARTAN 320 MG/1
320 TABLET ORAL DAILY
Qty: 90 TABLET | Refills: 1 | Status: SHIPPED | OUTPATIENT
Start: 2022-10-20

## 2022-10-20 NOTE — PROGRESS NOTES
[facility] CHF CLINIC OFFICE VISIT    Subjective:     Congestive Heart Failure      History of Present Illness      PCP: Dr. Lin      Hospitalizations: 9/7-9/11/22 CHF    Mrs. Haven Mahoney is a 56-year-old  female with medical history of HTN (chronic, controlled), HLD (chronic, controlled), Seizures, Pancreatitis, RLS, TIA, Chronic back pain, current right rotator cuff repair-- following with Dr. Ankit Mccarthy. She presented in September 2022 to establish with the CHF clinic due to recent hospitalization for decompensated heart failure per review of records.  Patient reported orthopnea, abdominal distention lower extremity edema, shortness of breath with exertion over 2 to 3 weeks, worsening over 2 to 3 days prior to presentation.  Echocardiogram was completed showing mild reduction in LVEF at 51 to 55%, grade 1 diastolic dysfunction. Pulmonary edema noted on CXR, ProBNP normal.   Patient was diuresed with IV Lasix with 15 pounds fluid removal.  He was also given short course of doxycycline for possible pneumonia    Patient has been doing well, reports back to baseline.  She denies edema, shortness of breath, PND, orthopnea, dizziness, syncope, palpitations or chest pain. She previously followed with Dr. Yu. In 2013,  she underwent cardiac catheterization which showed no significant epicardial coronary artery disease.  She does have mild aortic stenosis which is unchanged on most recent echocardiogram.         Past Medical History:   Diagnosis Date   • Anxiety    • Arthritis    • Arthropathy of lumbar facet joint    • Callus    • Chronic depression    • Degeneration of lumbar intervertebral disc    • Drug therapy     Other long term (current) drug therapy      • Epilepsy (HCC)    • Foot pain, left    • Headache    • Hearing loss    • Heart problem    • High blood pressure    • Hypercholesterolemia    • Hypertensive disorder    • Obesity    • Osteoporosis    • Osteoporosis    • Pancreatitis    •  Seizures (HCC)    • Varicose veins of lower extremity    • Wears glasses      Past Surgical History:   Procedure Laterality Date   • APPENDECTOMY     • CARDIAC CATHETERIZATION  06/05/2013    Cardiac cath 13649 (1)      • CHOLECYSTECTOMY     • INCISION AND DRAINAGE LEG Left 1/29/2021    Procedure: Left foot incision and drainage, tibial sesamoidectomy            (MIN C-ARM);  Surgeon: Gamaliel Starks DPM;  Location: [facility];  Service: Podiatry;  Laterality: Left;   • INCISION AND DRAINAGE LEG Left 3/4/2021    Procedure: Left foot incision and drainage, bone biopsy;  Surgeon: Gamaliel Starks DPM;  Location: [facility];  Service: Podiatry;  Laterality: Left;   • INJECTION OF MEDICATION  03/28/2016    Injection for nerve block (2)      • OTHER SURGICAL HISTORY  05/05/2016    Incise spinal column/nerves (1)        Social History     Socioeconomic History   • Marital status:    Tobacco Use   • Smoking status: Former   • Smokeless tobacco: Never   Vaping Use   • Vaping Use: Never used   Substance and Sexual Activity   • Alcohol use: No   • Drug use: No   • Sexual activity: Defer     Family History   Problem Relation Age of Onset   • Asthma Mother    • Cancer Mother         other   • Diabetes Mother    • Heart disease Mother    • Hyperlipidemia Mother    • Migraines Mother    • Osteoporosis Mother    • Cancer Father         other   • Hyperlipidemia Father    • Diabetes Maternal Grandmother    • Cancer Maternal Grandmother    • Heart disease Maternal Grandfather    • Mental illness Other         Mental Disorder   • Cancer Maternal Uncle        Allergies:  Allergies   Allergen Reactions   • Amoxicillin-Pot Clavulanate Hives   • Clarithromycin Hives   • Ibuprofen Hives and Nausea And Vomiting   • Naproxen Hives and Nausea And Vomiting   • Nsaids Hives   • Other Other (See Comments)     Hx of MRSA       Review of Systems   Constitutional: Negative for chills, fever, malaise/fatigue and weight gain.   HENT:  Negative for nosebleeds and tinnitus.    Eyes: Negative for blurred vision and double vision.   Cardiovascular: Negative for chest pain, dyspnea on exertion, irregular heartbeat, leg swelling, palpitations and syncope.   Respiratory: Negative for cough, shortness of breath, sleep disturbances due to breathing and snoring.    Endocrine: Negative for polydipsia, polyphagia and polyuria.   Hematologic/Lymphatic: Negative for bleeding problem. Does not bruise/bleed easily.   Skin: Negative for color change and suspicious lesions.   Musculoskeletal: Positive for arthritis and back pain. Negative for falls and myalgias.   Gastrointestinal: Negative for bloating, heartburn and hematochezia.   Genitourinary: Negative for dysuria and hematuria.   Neurological: Negative for dizziness, headaches, seizures, vertigo and weakness.   Psychiatric/Behavioral: Negative for altered mental status and depression. The patient does not have insomnia and is not nervous/anxious.    Allergic/Immunologic: Negative for environmental allergies and persistent infections.       Current Outpatient Medications   Medication Sig Dispense Refill   • fluticasone (FLONASE) 50 MCG/ACT nasal spray 1 spray into the nostril(s) as directed by provider As Needed.     • furosemide (LASIX) 20 MG tablet Take 1 tablet by mouth Daily As Needed (weight gain 3 lbs overnight, 5 lbs week, or edema). 90 tablet 1   • gabapentin (NEURONTIN) 300 MG capsule Take 600 mg by mouth Every Night.     • HYDROcodone-acetaminophen (NORCO) 7.5-325 MG per tablet Take 1 tablet by mouth 5 (Five) Times a Day As Needed.     • Loratadine 10 MG capsule Take 10 mg by mouth Daily.     • metoprolol succinate XL (TOPROL-XL) 25 MG 24 hr tablet Take 1 tablet by mouth Daily. 90 tablet 1   • rosuvastatin (CRESTOR) 5 MG tablet Take 5 mg by mouth 3 (Three) Times a Week. Monday, Wednesday, Friday     • valsartan (DIOVAN) 320 MG tablet Take 1 tablet by mouth Daily. 90 tablet 1     No current  "facility-administered medications for this visit.        Objective:     Vitals:    10/20/22 1038   BP: 106/72   BP Location: Left arm   Patient Position: Sitting   Cuff Size: Adult   Pulse: 76   SpO2: 98%   Weight: 111 kg (244 lb 1.6 oz)   Height: 152.4 cm (60\")       Wt Readings from Last 3 Encounters:   10/20/22 111 kg (244 lb 1.6 oz)   09/20/22 111 kg (243 lb 11.2 oz)   09/11/22 111 kg (245 lb 6.4 oz)            Vitals reviewed.   Constitutional:       General: Not in acute distress.     Appearance: Well-groomed. Morbidly obese. Not ill-appearing, toxic-appearing or diaphoretic.   Eyes:      General: Lids are normal.      Conjunctiva/sclera: Conjunctivae normal.   HENT:      Head: Normocephalic and atraumatic.      Right Ear: External ear normal.      Left Ear: External ear normal.   Neck:      Vascular: No JVD.   Pulmonary:      Effort: Pulmonary effort is normal. No respiratory distress.      Breath sounds: Normal breath sounds. No decreased breath sounds. No wheezing. No rales.   Chest:      Chest wall: Not tender to palpatation.   Cardiovascular:      PMI at left midclavicular line. Normal rate. Regular rhythm. Normal S1 with normal intensity. Normal S2 with normal intensity.      Murmurs: There is a grade 1/6 harsh midsystolic murmur at the URSB.      No gallop. No S3 and S4 gallop. No click. No rub.   Pulses:     Intact distal pulses. No decreased pulses.   Edema:     Peripheral edema absent.   Abdominal:      General: Bowel sounds are normal. There is no distension.      Palpations: Abdomen is soft.      Tenderness: There is no abdominal tenderness.   Musculoskeletal:      Cervical back: Normal range of motion and neck supple. Skin:     General: Skin is warm and dry.      Coloration: Skin is not pale.      Findings: No erythema or rash.   Neurological:      Mental Status: Alert and oriented to person, place, and time.      Gait: Gait normal.   Psychiatric:         Behavior: Behavior normal.         Thought " Content: Thought content normal.         Judgment: Judgment normal.         Cardiographics  Results for orders placed during the hospital encounter of 09/07/22    Adult Transthoracic Echo Complete W/ Cont if Necessary Per Protocol    Interpretation Summary  · Estimated right ventricular systolic pressure from tricuspid regurgitation is normal (<35 mmHg).  · Left ventricular wall thickness is consistent with mild concentric hypertrophy.  · Left ventricular ejection fraction appears to be 51 - 55%.  · Left ventricular diastolic function is consistent with (grade I) impaired relaxation.  · Mild aortic valve stenosis is present.      @[email or web address]    No radiology results for the last 30 days.    EKG:          Lab Review     Lab Results   Component Value Date    TSH 1.450 09/08/2022     Lab Results   Component Value Date    GLUCOSE 112 (H) 09/11/2022    BUN 21 (H) 09/11/2022    CREATININE 0.80 09/11/2022    EGFRIFNONA 98 05/03/2021    EGFRIFAFRI 125 07/20/2021    BCR 26.3 (H) 09/11/2022    K 3.8 09/11/2022    CO2 28.0 09/11/2022    CALCIUM 9.6 09/11/2022    ALBUMIN 3.90 09/08/2022    AST 16 09/08/2022    ALT 9 09/08/2022     Lab Results   Component Value Date    WBC 9.02 09/09/2022    HGB 13.1 09/09/2022    HCT 40.7 09/09/2022    MCV 82.2 09/09/2022     09/09/2022       Lab Results   Component Value Date    CKTOTAL 15 (L) 05/03/2021    TROPONINT <0.010 09/08/2022     Lab Results   Component Value Date    PROBNP 586.0 09/07/2022            The following portions of the patient's history were reviewed and updated as appropriate: allergies, current medications, past family history, past medical history, past social history, past surgical history and problem list.     Old records reviewed and pertinent information is included in the above objective data.     Assessment/Plan:      Diagnosis Plan   1. Chronic heart failure with preserved ejection fraction (HCC)  valsartan (DIOVAN) 320 MG tablet    metoprolol succinate XL  (TOPROL-XL) 25 MG 24 hr tablet    furosemide (LASIX) 20 MG tablet      2. Primary hypertension        3. Pure hypercholesterolemia        4. Nonrheumatic aortic valve stenosis        5. Class 3 severe obesity due to excess calories with serious comorbidity and body mass index (BMI) of 45.0 to 49.9 in adult ([facility])        6. Pre-operative cardiovascular examination           #1. HFpEF:  EF:low normal at 51-55%. NYHA Class I, Stage C. Patient appears euvolemic.  and in a well perfused physiologic state. Hemodynamics are acceptable  BETA-BLOCKER: Toprol XL 25mg  ACE/ARB: Valsartan 320mg   ENTRESTO: can consider  DIURETIC: Lasix may change to 20mg 3x week then as needed  ALDOSTERONE ANTAGONIST: can consider  IMDUR/HYDRALAZINE: N/A  DIGOXIN: N/A  Fluid restriction: 2 L  Sodium restriction:2 grams  6MWT: next visit  ICD: QRS: 120ms    Reiterated all educational points this visit.  Continue daily weight monitoring.  Continue restricted dietary sodium intake.  Discussed patient action plan for heart failure. Contact information for this office verbalized.  Recommended avoiding NSAIDs use.  Discussed warning signs requiring additional medical attention for heart failure.   Education points repeated back by patient.     #2. HTN: chronic, controlled with current anti-hypertensives    #3: HLD: chronic continue statin.    #4. Mild AS: unchanged from previous study. Educated on s/s to call and report. She will remain in clinical surveillance.     #5. Pre-Op Evaluation Assessment  56 y.o. female with planned surgery: right rotator cuff repair-- following with Dr. Luis Antonio Mccarthy.  Known risk factors for perioperative complications: Congestive heart failure.      Cardiac Risk Estimation: per the Revised Cardiac Risk Index (Circ. 100:1043, 1999), the patient's risk factors for cardiac complications include history of congestive heart failure, putting her in: RCI RISK CLASS II (1 risk factor, risk of major cardiac compl. appr.  "1.3%).     Pre-Op Evaluation Plan  1. Preoperative workup as follows: recent echocardiogram showing LVEF 51-55% and mild AS unchanged. There are no signs of cardiac arrhythmia, angina or decompensation today.  Anesthesia and provider should be careful to avoid excess amounts of IVF during procedure to avoid volume overload  2. Change in medication regimen before surgery: none, continue medication regimen including morning of surgery, with sip of water.  3. Prophylaxis for cardiac events with perioperative beta-blockers: Patient should take Toprol XL as prescribed the night before procedure  4. Invasive hemodynamic monitoring perioperatively: at the discretion of anesthesiologist.  5. Deep vein thrombosis prophylaxis:regimen to be chosen by surgical team.    Cardiovascular Risk Estimates provided by the [facility] are provisional and no guarantee of outcome. [facility] does not provide \"surgical clearance,\" but only provides a risk assessment and management options.  The final decision for operative intervention is at the discretion of the operating physician.    Class 3 Severe Obesity (BMI >=40). Obesity-related health conditions include the following: hypertension, diabetes mellitus and dyslipidemias. Obesity is unchanged. BMI is is above average; BMI management plan is completed. We discussed portion control and increasing exercise.      Follow up: 6 months       I spent 30 minutes caring for Haven on this date of service. This time includes time spent by me in the following activities: preparing for the visit, reviewing tests, obtaining and/or reviewing a separately obtained history, performing a medically appropriate examination and/or evaluation, counseling and educating the patient/family/caregiver, ordering medications, tests, or procedures, referring and communicating with other health care professionals, documenting information in the medical record, independently interpreting results and communicating that " information with the patient/family/caregiver and care coordination              This document has been electronically signed by MORENITA Leonardo on October 20, 2022 12:49 CDT

## 2023-04-20 ENCOUNTER — OFFICE VISIT (OUTPATIENT)
Dept: CARDIOLOGY | Facility: CLINIC | Age: 58
End: 2023-04-20
Payer: MEDICARE

## 2023-04-20 VITALS
BODY MASS INDEX: 48.29 KG/M2 | OXYGEN SATURATION: 97 % | SYSTOLIC BLOOD PRESSURE: 138 MMHG | WEIGHT: 246 LBS | HEIGHT: 60 IN | DIASTOLIC BLOOD PRESSURE: 88 MMHG | HEART RATE: 81 BPM

## 2023-04-20 DIAGNOSIS — E78.00 PURE HYPERCHOLESTEROLEMIA: ICD-10-CM

## 2023-04-20 DIAGNOSIS — I50.32 CHRONIC HEART FAILURE WITH PRESERVED EJECTION FRACTION: Primary | ICD-10-CM

## 2023-04-20 DIAGNOSIS — E66.01 CLASS 3 SEVERE OBESITY DUE TO EXCESS CALORIES WITH SERIOUS COMORBIDITY AND BODY MASS INDEX (BMI) OF 45.0 TO 49.9 IN ADULT: ICD-10-CM

## 2023-04-20 DIAGNOSIS — I10 PRIMARY HYPERTENSION: ICD-10-CM

## 2023-04-20 DIAGNOSIS — I35.0 NONRHEUMATIC AORTIC VALVE STENOSIS: ICD-10-CM

## 2023-04-20 RX ORDER — METOPROLOL SUCCINATE 25 MG/1
25 TABLET, EXTENDED RELEASE ORAL DAILY
Qty: 90 TABLET | Refills: 3 | Status: SHIPPED | OUTPATIENT
Start: 2023-04-20

## 2023-04-20 RX ORDER — SPIRONOLACTONE 25 MG/1
25 TABLET ORAL DAILY
Qty: 90 TABLET | Refills: 3 | Status: SHIPPED | OUTPATIENT
Start: 2023-04-20

## 2023-04-20 RX ORDER — VALSARTAN 320 MG/1
320 TABLET ORAL DAILY
Qty: 90 TABLET | Refills: 3 | Status: SHIPPED | OUTPATIENT
Start: 2023-04-20

## 2023-04-20 RX ORDER — FUROSEMIDE 20 MG/1
20 TABLET ORAL DAILY PRN
Qty: 90 TABLET | Refills: 3 | Status: SHIPPED | OUTPATIENT
Start: 2023-04-20

## 2023-04-20 NOTE — PROGRESS NOTES
[facility] CHF CLINIC OFFICE VISIT    Subjective:     Chronic heart failure with preserved ejection fraction      History of Present Illness      PCP: Dr. Lin      Hospitalizations: 9/7-9/11/22 CHF    Mrs. Haven Mahoney is a 57-year-old  female with medical history of HTN (chronic, controlled), HLD (chronic, controlled), Seizures, Pancreatitis, RLS, TIA, Chronic back pain, right rotator cuff repair-- following with Dr. Ankit Mccarthy. She presented in September 2022 to establish with the CHF clinic due to recent hospitalization for decompensated heart failure per review of records.  Patient reported orthopnea, abdominal distention lower extremity edema, shortness of breath with exertion over 2 to 3 weeks, worsening over 2 to 3 days prior to presentation.  Echocardiogram was completed showing mild reduction in LVEF at 51 to 55%, grade 1 diastolic dysfunction. Pulmonary edema noted on CXR, ProBNP normal.   Patient was diuresed with IV Lasix with 15 pounds fluid removal.  He was also given short course of doxycycline for possible pneumonia    Patient has been doing well, reports back to baseline.  She denies edema, shortness of breath, PND, orthopnea, dizziness, syncope, palpitations or chest pain. She previously followed with Dr. Yu. In 2013,  she underwent cardiac catheterization which showed no significant epicardial coronary artery disease.  She does have mild aortic stenosis which is unchanged on most recent echocardiogram.  Since last visit she has been doing well status post rotator cuff surgery on January 31, 2023.  She ports has recovered well from this.  Does have some occasional leg cramps, likely due to low potassium.      Past Medical History:   Diagnosis Date   • Anxiety    • Arthritis    • Arthropathy of lumbar facet joint    • Callus    • Chronic depression    • Degeneration of lumbar intervertebral disc    • Drug therapy     Other long term (current) drug therapy      • Epilepsy    • Foot  pain, left    • Headache    • Hearing loss    • Heart problem    • High blood pressure    • Hypercholesterolemia    • Hypertensive disorder    • Obesity    • Osteoporosis    • Osteoporosis    • Pancreatitis    • Seizures    • Varicose veins of lower extremity    • Wears glasses      Past Surgical History:   Procedure Laterality Date   • APPENDECTOMY     • CARDIAC CATHETERIZATION  06/05/2013    Cardiac cath [id number] (1)      • CHOLECYSTECTOMY     • INCISION AND DRAINAGE LEG Left 1/29/2021    Procedure: Left foot incision and drainage, tibial sesamoidectomy            (MIN C-ARM);  Surgeon: Gamaliel Starks DPM;  Location: [facility];  Service: Podiatry;  Laterality: Left;   • INCISION AND DRAINAGE LEG Left 3/4/2021    Procedure: Left foot incision and drainage, bone biopsy;  Surgeon: Gamaliel Starks DPM;  Location: [facility];  Service: Podiatry;  Laterality: Left;   • INJECTION OF MEDICATION  03/28/2016    Injection for nerve block (2)      • OTHER SURGICAL HISTORY  05/05/2016    Incise spinal column/nerves (1)        Social History     Socioeconomic History   • Marital status:    Tobacco Use   • Smoking status: Former   • Smokeless tobacco: Never   Vaping Use   • Vaping Use: Never used   Substance and Sexual Activity   • Alcohol use: No   • Drug use: No   • Sexual activity: Defer     Family History   Problem Relation Age of Onset   • Asthma Mother    • Cancer Mother         other   • Diabetes Mother    • Heart disease Mother    • Hyperlipidemia Mother    • Migraines Mother    • Osteoporosis Mother    • Cancer Father         other   • Hyperlipidemia Father    • Diabetes Maternal Grandmother    • Cancer Maternal Grandmother    • Heart disease Maternal Grandfather    • Mental illness Other         Mental Disorder   • Cancer Maternal Uncle        Allergies:  Allergies   Allergen Reactions   • Amoxicillin-Pot Clavulanate Hives   • Clarithromycin Hives   • Ibuprofen Hives and Nausea And Vomiting   • Naproxen  Hives and Nausea And Vomiting   • Nsaids Hives   • Other Other (See Comments)     Hx of MRSA       Review of Systems   Constitutional: Negative for chills, fever, malaise/fatigue and weight gain.   HENT: Negative for nosebleeds and tinnitus.    Eyes: Negative for blurred vision and double vision.   Cardiovascular: Negative for chest pain, dyspnea on exertion, irregular heartbeat, leg swelling, palpitations and syncope.   Respiratory: Negative for cough, shortness of breath, sleep disturbances due to breathing and snoring.    Endocrine: Negative for polydipsia, polyphagia and polyuria.   Hematologic/Lymphatic: Negative for bleeding problem. Does not bruise/bleed easily.   Skin: Negative for color change and suspicious lesions.   Musculoskeletal: Positive for arthritis, back pain and myalgias. Negative for falls.   Gastrointestinal: Negative for bloating, heartburn and hematochezia.   Genitourinary: Negative for dysuria and hematuria.   Neurological: Negative for dizziness, headaches, seizures, vertigo and weakness.   Psychiatric/Behavioral: Negative for altered mental status and depression. The patient does not have insomnia and is not nervous/anxious.    Allergic/Immunologic: Negative for environmental allergies and persistent infections.       Current Outpatient Medications   Medication Sig Dispense Refill   • fluticasone (FLONASE) 50 MCG/ACT nasal spray 1 spray into the nostril(s) as directed by provider As Needed.     • furosemide (LASIX) 20 MG tablet Take 1 tablet by mouth Daily As Needed (weight gain 3 lbs overnight, 5 lbs week, or edema). 90 tablet 1   • gabapentin (NEURONTIN) 300 MG capsule Take 2 capsules by mouth Every Night.     • HYDROcodone-acetaminophen (NORCO) 7.5-325 MG per tablet Take 1 tablet by mouth 5 (Five) Times a Day As Needed.     • Loratadine 10 MG capsule Take 1 capsule by mouth Daily.     • metoprolol succinate XL (TOPROL-XL) 25 MG 24 hr tablet Take 1 tablet by mouth Daily. 90 tablet 1   •  "rosuvastatin (CRESTOR) 5 MG tablet Take 1 tablet by mouth 3 (Three) Times a Week. Monday, Wednesday, Friday     • valsartan (DIOVAN) 320 MG tablet Take 1 tablet by mouth Daily. 90 tablet 1   • spironolactone (ALDACTONE) 25 MG tablet Take 1 tablet by mouth Daily. 90 tablet 3     No current facility-administered medications for this visit.        Objective:     Vitals:    04/20/23 0958   BP: 138/88   BP Location: Left arm   Patient Position: Sitting   Cuff Size: Adult   Pulse: 81   SpO2: 97%   Weight: 112 kg (246 lb)   Height: 152.4 cm (60\")       Wt Readings from Last 3 Encounters:   04/20/23 112 kg (246 lb)   10/20/22 111 kg (244 lb 1.6 oz)   09/20/22 111 kg (243 lb 11.2 oz)            Vitals reviewed.   Constitutional:       General: Not in acute distress.     Appearance: Well-groomed. Morbidly obese. Not ill-appearing, toxic-appearing or diaphoretic.   Eyes:      General: Lids are normal.      Conjunctiva/sclera: Conjunctivae normal.   HENT:      Head: Normocephalic and atraumatic.      Right Ear: External ear normal.      Left Ear: External ear normal.   Neck:      Vascular: No JVD.   Pulmonary:      Effort: Pulmonary effort is normal. No respiratory distress.      Breath sounds: Normal breath sounds. No decreased breath sounds. No wheezing. No rales.   Chest:      Chest wall: Not tender to palpatation.   Cardiovascular:      PMI at left midclavicular line. Normal rate. Regular rhythm. Normal S1 with normal intensity. Normal S2 with normal intensity.      Murmurs: There is a grade 2/6 harsh midsystolic murmur at the URSB, radiating to the neck.      No gallop. No S3 and S4 gallop. No click. No rub.   Pulses:     Intact distal pulses. No decreased pulses.   Edema:     Peripheral edema absent.   Abdominal:      General: Bowel sounds are normal. There is no distension.      Palpations: Abdomen is soft.      Tenderness: There is no abdominal tenderness.   Musculoskeletal:      Cervical back: Normal range of motion " and neck supple. Skin:     General: Skin is warm and dry.      Coloration: Skin is not pale.      Findings: No erythema or rash.   Neurological:      Mental Status: Alert and oriented to person, place, and time.      Gait: Gait normal.   Psychiatric:         Behavior: Behavior normal.         Thought Content: Thought content normal.         Judgment: Judgment normal.         Cardiographics  Results for orders placed during the hospital encounter of 09/07/22    Adult Transthoracic Echo Complete W/ Cont if Necessary Per Protocol    Interpretation Summary  · Estimated right ventricular systolic pressure from tricuspid regurgitation is normal (<35 mmHg).  · Left ventricular wall thickness is consistent with mild concentric hypertrophy.  · Left ventricular ejection fraction appears to be 51 - 55%.  · Left ventricular diastolic function is consistent with (grade I) impaired relaxation.  · Mild aortic valve stenosis is present.      @LPGRADLAST    No radiology results for the last 30 days.    EKG:          Lab Review     Lab Results   Component Value Date    TSH 1.450 09/08/2022     Lab Results   Component Value Date    GLUCOSE 112 (H) 09/11/2022    BUN 21 (H) 09/11/2022    CREATININE 0.80 09/11/2022    EGFRIFNONA 98 05/03/2021    EGFRIFAFRI 125 07/20/2021    BCR 26.3 (H) 09/11/2022    K 3.8 09/11/2022    CO2 28.0 09/11/2022    CALCIUM 9.6 09/11/2022    ALBUMIN 3.90 09/08/2022    AST 16 09/08/2022    ALT 9 09/08/2022     Lab Results   Component Value Date    WBC 9.02 09/09/2022    HGB 13.1 09/09/2022    HCT 40.7 09/09/2022    MCV 82.2 09/09/2022     09/09/2022       Lab Results   Component Value Date    CKTOTAL 15 (L) 05/03/2021    TROPONINT <0.010 09/08/2022     Lab Results   Component Value Date    PROBNP 586.0 09/07/2022            The following portions of the patient's history were reviewed and updated as appropriate: allergies, current medications, past family history, past medical history, past social  history, past surgical history and problem list.     Old records reviewed and pertinent information is included in the above objective data.     Assessment/Plan:      Diagnosis Plan   1. Chronic heart failure with preserved ejection fraction  spironolactone (ALDACTONE) 25 MG tablet      2. Pure hypercholesterolemia        3. Primary hypertension  spironolactone (ALDACTONE) 25 MG tablet      4. Nonrheumatic aortic valve stenosis        5. Class 3 severe obesity due to excess calories with serious comorbidity and body mass index (BMI) of 45.0 to 49.9 in adult           #1. HFpEF:  EF:low normal at 51-55%. NYHA Class I, Stage C. Patient appears euvolemic.  and in a well perfused physiologic state. Hemodynamics are acceptable  BETA-BLOCKER: Toprol XL 25mg  ACE/ARB: Valsartan 320mg   ENTRESTO: can consider  DIURETIC: Lasix 20mg PRN  ALDOSTERONE ANTAGONIST: Start spironolactone 25 mg, CMP in around 10 days to recheck kidney function and potassium  IMDUR/HYDRALAZINE: N/A  DIGOXIN: N/A  Fluid restriction: 2 L  Sodium restriction:2 grams  6MWT: next visit  ICD: QRS: 120ms    Reiterated all educational points this visit.  Continue daily weight monitoring.  Continue restricted dietary sodium intake.  Discussed patient action plan for heart failure. Contact information for this office verbalized.  Recommended avoiding NSAIDs use.  Discussed warning signs requiring additional medical attention for heart failure.   Education points repeated back by patient.     #2. HTN: chronic, controlled with current anti-hypertensives    #3: HLD: chronic continue statin.    #4. Mild AS: unchanged from previous study. Educated on s/s to call and report. She will remain in clinical surveillance.     Class 3 Severe Obesity (BMI >=40). Obesity-related health conditions include the following: hypertension, diabetes mellitus and dyslipidemias. Obesity is unchanged. BMI is is above average; BMI management plan is completed. We discussed portion  control and increasing exercise.      Follow up: 6 months       I spent 30 minutes caring for Haven on this date of service. This time includes time spent by me in the following activities: preparing for the visit, reviewing tests, obtaining and/or reviewing a separately obtained history, performing a medically appropriate examination and/or evaluation, counseling and educating the patient/family/caregiver, ordering medications, tests, or procedures, referring and communicating with other health care professionals, documenting information in the medical record, independently interpreting results and communicating that information with the patient/family/caregiver and care coordination              This document has been electronically signed by MORENITA Leonardo on April 20, 2023 11:02 CDT

## 2023-09-26 NOTE — PROGRESS NOTES
"    Pharmacokinetics by Pharmacy - Vancomycin    Haven Mahoney is a 55 y.o. female  [Ht: 152.4 cm (60\"); Wt: 127 kg (280 lb)]    Estimated Creatinine Clearance: 127 mL/min (by C-G formula based on SCr of 0.61 mg/dL).   Creatinine   Date Value Ref Range Status   03/06/2021 0.61 0.57 - 1.00 mg/dL Final   03/05/2021 0.63 0.57 - 1.00 mg/dL Final   03/04/2021 0.55 (L) 0.57 - 1.00 mg/dL Final      Lab Results   Component Value Date    WBC 7.07 03/06/2021    WBC 7.00 03/05/2021    WBC 7.87 03/04/2021      Temp Readings from Last 1 Encounters:   03/06/21 97.7 °F (36.5 °C) (Oral)      Lab Results   Component Value Date    VANCOPEAK 24.60 03/05/2021    VANCOTROUGH 12.80 03/06/2021        C-Reactive Protein   Date Value Ref Range Status   03/03/2021 1.70 (H) 0.00 - 0.50 mg/dL Final     Lactate   Date Value Ref Range Status   03/17/2020 1.5 0.5 - 2.0 mmol/L Final       Culture Results:  Microbiology Results (last 10 days)       Procedure Component Value - Date/Time    Wound Culture - Wound, Toe, Left [[id number]]  (Abnormal) Collected: 03/04/21 1212    Lab Status: Preliminary result Specimen: Wound from Toe, Left Updated: 03/05/21 0916     Wound Culture Scant growth (1+) Staphylococcus aureus     Gram Stain Rare (1+) WBCs seen      Rare (1+) Gram positive cocci    Blood Culture - Blood, Hand, Right [[id number]] Collected: 03/03/21 0804    Lab Status: Preliminary result Specimen: Blood from Hand, Right Updated: 03/06/21 0830     Blood Culture No growth at 3 days    Blood Culture - Blood, Arm, Right [[id number]] Collected: 03/02/21 1408    Lab Status: Preliminary result Specimen: Blood from Arm, Right Updated: 03/05/21 1415     Blood Culture No growth at 3 days    COVID-19 and FLU A/B PCR - Swab, Nasopharynx [[id number]]  (Normal) Collected: 03/02/21 1402    Lab Status: Final result Specimen: Swab from Nasopharynx Updated: 03/02/21 1432     COVID19 Not Detected     Influenza A PCR Not Detected     Influenza B PCR Not Detected    " Narrative:      Fact sheet for providers: https://www.fda.gov/media/108769/download    Fact sheet for patients: https://www.fda.gov/media/768574/download    Test performed by PCR.          No results found for: RESPCX    Indication for use: osteomyelitis Left great toe    Current Vancomycin Dose:  1000 mg IVPB every 12 hours, day 5 of therapy.      Assessment/Plan:  Reviewed above labs and cultures.   Vancomycin peak level from 03/05 at 2016 is 24.6 (goal 30-40). Vancomycin trough level from 3/6 at 0517 is 12.8 (goal 10-20). Lab levels were drawn at the correct time. This will give an AUC of 435.07 (goal 400-600). Will continue current dose.  Pharmacy will continue to monitor renal function and adjust dose accordingly.    Gema Vila, PharmD   03/06/21 08:30 CST            No

## 2023-10-27 NOTE — PLAN OF CARE
Problem: Adult Inpatient Plan of Care  Goal: Plan of Care Review  Outcome: Ongoing, Progressing  Flowsheets (Taken 9/8/2022 1619)  Plan of Care Reviewed With: patient   Goal Outcome Evaluation:  Plan of Care Reviewed With: patient         Appetite normal.  Wt loss r/t fluid loss.  Heart healthy diet edu reviewed and handouts given.  Pt was engaged in education.        "Patient's FSGs are uncontrolled due to hyperglycemia on current medication regimen.  Last A1c reviewed-   Lab Results   Component Value Date    HGBA1C 7.5 (H) 09/02/2021     Most recent fingerstick glucose reviewed- No results for input(s): "POCTGLUCOSE" in the last 24 hours.  Current correctional scale  Low  Maintain anti-hyperglycemic dose as follows-   Antihyperglycemics (From admission, onward)    None        Most recent A1c measuring   Hemoglobin A1C   Date Value Ref Range Status   09/02/2021 7.5 (H) <=6.5 % Final      Plan:  -SSI  -Accu-checks   -Hypoglycemic protocol   -Hold any oral antihyperglycemics while inpatient     Controlled  Will relax normotensive/normoglycemic range in this geriatric population        "

## (undated) DEVICE — GLV SURG SENSICARE POLYISPRN W/ALOE PF LF 6.5 GRN STRL

## (undated) DEVICE — PK POD 60

## (undated) DEVICE — SOL IRR NACL 0.9PCT 3000ML

## (undated) DEVICE — SYR LL TP 10ML STRL

## (undated) DEVICE — 9165 UNIVERSAL PATIENT PLATE: Brand: 3M™

## (undated) DEVICE — DRSNG GZ CURAD XEROFORM NONADHS 5X9IN STRL

## (undated) DEVICE — GLV SURG SENSICARE PI PF LF 7 GRN STRL

## (undated) DEVICE — SUT VIC 3/0 SH 27IN J416H

## (undated) DEVICE — SPNG GZ WOVN 4X4IN 12PLY 10/BX STRL

## (undated) DEVICE — GOWN,NON-REINFORCED,SIRUS,SET IN SLV,XL: Brand: MEDLINE

## (undated) DEVICE — SOL IRR NACL 0.9PCT BT 1000ML

## (undated) DEVICE — ANTIBACTERIAL UNDYED BRAIDED (POLYGLACTIN 910), SYNTHETIC ABSORBABLE SUTURE: Brand: COATED VICRYL

## (undated) DEVICE — CYSTO/BLADDER IRRIGATION SET, REGULATING CLAMP

## (undated) DEVICE — STERILE POLYISOPRENE POWDER-FREE SURGICAL GLOVES WITH EMOLLIENT COATING: Brand: PROTEXIS

## (undated) DEVICE — SOL CHLORHEXIDINE 4% CHG GLUCONATE 4OZ

## (undated) DEVICE — NDL HYPO ECLPS SFTY 25G 1 1/2IN

## (undated) DEVICE — JAM SHIDI: Brand: XIA PRECISION SYSTEM

## (undated) DEVICE — UNDRPD BREATH 23X36 BG/10

## (undated) DEVICE — GLV SURG SENSICARE PI LF PF 8 GRN STRL

## (undated) DEVICE — STERILE POLYISOPRENE POWDER-FREE SURGICAL GLOVES: Brand: PROTEXIS

## (undated) DEVICE — CVR C/ARM MINI

## (undated) DEVICE — CONTAINER,SPECIMEN,OR STERILE,4OZ: Brand: MEDLINE

## (undated) DEVICE — SUT ETHLN 4/0 FS2 18IN 662H

## (undated) DEVICE — DISPOSABLE TOURNIQUET CUFF SINGLE BLADDER, DUAL PORT AND QUICK CONNECT CONNECTOR: Brand: COLOR CUFF

## (undated) DEVICE — GLV SURG SENSICARE PI ORTHO SZ6.5 LF STRL